# Patient Record
Sex: MALE | Race: WHITE | NOT HISPANIC OR LATINO | URBAN - METROPOLITAN AREA
[De-identification: names, ages, dates, MRNs, and addresses within clinical notes are randomized per-mention and may not be internally consistent; named-entity substitution may affect disease eponyms.]

---
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
## 2022-05-15 ENCOUNTER — HOSPITAL ENCOUNTER (EMERGENCY)
Facility: HOSPITAL | Age: 22
Discharge: HOME | End: 2022-05-15
Attending: EMERGENCY MEDICINE | Admitting: EMERGENCY MEDICINE

## 2022-05-15 VITALS
HEART RATE: 97 BPM | TEMPERATURE: 98.4 F | BODY MASS INDEX: 33.7 KG/M2 | HEIGHT: 74 IN | DIASTOLIC BLOOD PRESSURE: 68 MMHG | OXYGEN SATURATION: 99 % | RESPIRATION RATE: 16 BRPM | SYSTOLIC BLOOD PRESSURE: 133 MMHG | WEIGHT: 262.57 LBS

## 2022-05-15 DIAGNOSIS — F31.9 BIPOLAR AFFECTIVE DISORDER, REMISSION STATUS UNSPECIFIED: Primary | ICD-10-CM

## 2022-05-15 LAB
ALBUMIN SERPL-MCNC: 4.9 G/DL (ref 3.5–5.2)
ALBUMIN/GLOB SERPL: 1.8 G/DL
ALP SERPL-CCNC: 100 U/L (ref 39–117)
ALT SERPL W P-5'-P-CCNC: 134 U/L (ref 1–41)
AMPHET+METHAMPHET UR QL: NEGATIVE
ANION GAP SERPL CALCULATED.3IONS-SCNC: 13 MMOL/L (ref 5–15)
APAP SERPL-MCNC: <5 MCG/ML (ref 0–30)
AST SERPL-CCNC: 46 U/L (ref 1–40)
BARBITURATES UR QL SCN: NEGATIVE
BASOPHILS # BLD AUTO: 0.02 10*3/MM3 (ref 0–0.2)
BASOPHILS NFR BLD AUTO: 0.2 % (ref 0–1.5)
BENZODIAZ UR QL SCN: NEGATIVE
BILIRUB SERPL-MCNC: 0.3 MG/DL (ref 0–1.2)
BUN SERPL-MCNC: 15 MG/DL (ref 6–20)
BUN/CREAT SERPL: 13.6 (ref 7–25)
CALCIUM SPEC-SCNC: 10.1 MG/DL (ref 8.6–10.5)
CANNABINOIDS SERPL QL: NEGATIVE
CHLORIDE SERPL-SCNC: 105 MMOL/L (ref 98–107)
CO2 SERPL-SCNC: 26 MMOL/L (ref 22–29)
COCAINE UR QL: NEGATIVE
CREAT SERPL-MCNC: 1.1 MG/DL (ref 0.76–1.27)
DEPRECATED RDW RBC AUTO: 37.4 FL (ref 37–54)
EGFRCR SERPLBLD CKD-EPI 2021: 97.9 ML/MIN/1.73
EOSINOPHIL # BLD AUTO: 0.22 10*3/MM3 (ref 0–0.4)
EOSINOPHIL NFR BLD AUTO: 2 % (ref 0.3–6.2)
ERYTHROCYTE [DISTWIDTH] IN BLOOD BY AUTOMATED COUNT: 12.6 % (ref 12.3–15.4)
ETHANOL BLD-MCNC: <10 MG/DL (ref 0–10)
ETHANOL UR QL: <0.01 %
GLOBULIN UR ELPH-MCNC: 2.8 GM/DL
GLUCOSE SERPL-MCNC: 104 MG/DL (ref 65–99)
HCT VFR BLD AUTO: 46.8 % (ref 37.5–51)
HGB BLD-MCNC: 15.8 G/DL (ref 13–17.7)
HOLD SPECIMEN: NORMAL
HOLD SPECIMEN: NORMAL
IMM GRANULOCYTES # BLD AUTO: 0.05 10*3/MM3 (ref 0–0.05)
IMM GRANULOCYTES NFR BLD AUTO: 0.5 % (ref 0–0.5)
LITHIUM SERPL-SCNC: 0.4 MMOL/L (ref 0.6–1.2)
LYMPHOCYTES # BLD AUTO: 2.98 10*3/MM3 (ref 0.7–3.1)
LYMPHOCYTES NFR BLD AUTO: 27.3 % (ref 19.6–45.3)
MCH RBC QN AUTO: 27.6 PG (ref 26.6–33)
MCHC RBC AUTO-ENTMCNC: 33.8 G/DL (ref 31.5–35.7)
MCV RBC AUTO: 81.8 FL (ref 79–97)
METHADONE UR QL SCN: NEGATIVE
MONOCYTES # BLD AUTO: 0.71 10*3/MM3 (ref 0.1–0.9)
MONOCYTES NFR BLD AUTO: 6.5 % (ref 5–12)
NEUTROPHILS NFR BLD AUTO: 6.94 10*3/MM3 (ref 1.7–7)
NEUTROPHILS NFR BLD AUTO: 63.5 % (ref 42.7–76)
NRBC BLD AUTO-RTO: 0 /100 WBC (ref 0–0.2)
OPIATES UR QL: NEGATIVE
OXYCODONE UR QL SCN: NEGATIVE
PLATELET # BLD AUTO: 336 10*3/MM3 (ref 140–450)
PMV BLD AUTO: 9.1 FL (ref 6–12)
POTASSIUM SERPL-SCNC: 3.9 MMOL/L (ref 3.5–5.2)
PROT SERPL-MCNC: 7.7 G/DL (ref 6–8.5)
RBC # BLD AUTO: 5.72 10*6/MM3 (ref 4.14–5.8)
SALICYLATES SERPL-MCNC: <0.3 MG/DL
SARS-COV-2 RNA RESP QL NAA+PROBE: NOT DETECTED
SODIUM SERPL-SCNC: 144 MMOL/L (ref 136–145)
T4 FREE SERPL-MCNC: 1.18 NG/DL (ref 0.93–1.7)
TSH SERPL DL<=0.05 MIU/L-ACNC: 4.63 UIU/ML (ref 0.27–4.2)
WBC NRBC COR # BLD: 10.92 10*3/MM3 (ref 3.4–10.8)
WHOLE BLOOD HOLD COAG: NORMAL
WHOLE BLOOD HOLD SPECIMEN: NORMAL

## 2022-05-15 PROCEDURE — 84443 ASSAY THYROID STIM HORMONE: CPT | Performed by: EMERGENCY MEDICINE

## 2022-05-15 PROCEDURE — 80307 DRUG TEST PRSMV CHEM ANLYZR: CPT | Performed by: EMERGENCY MEDICINE

## 2022-05-15 PROCEDURE — 80053 COMPREHEN METABOLIC PANEL: CPT

## 2022-05-15 PROCEDURE — U0003 INFECTIOUS AGENT DETECTION BY NUCLEIC ACID (DNA OR RNA); SEVERE ACUTE RESPIRATORY SYNDROME CORONAVIRUS 2 (SARS-COV-2) (CORONAVIRUS DISEASE [COVID-19]), AMPLIFIED PROBE TECHNIQUE, MAKING USE OF HIGH THROUGHPUT TECHNOLOGIES AS DESCRIBED BY CMS-2020-01-R: HCPCS | Performed by: EMERGENCY MEDICINE

## 2022-05-15 PROCEDURE — 99284 EMERGENCY DEPT VISIT MOD MDM: CPT

## 2022-05-15 PROCEDURE — 80178 ASSAY OF LITHIUM: CPT

## 2022-05-15 PROCEDURE — 99283 EMERGENCY DEPT VISIT LOW MDM: CPT

## 2022-05-15 PROCEDURE — 82077 ASSAY SPEC XCP UR&BREATH IA: CPT

## 2022-05-15 PROCEDURE — 36415 COLL VENOUS BLD VENIPUNCTURE: CPT

## 2022-05-15 PROCEDURE — 93005 ELECTROCARDIOGRAM TRACING: CPT

## 2022-05-15 PROCEDURE — 85025 COMPLETE CBC W/AUTO DIFF WBC: CPT

## 2022-05-15 PROCEDURE — 80179 DRUG ASSAY SALICYLATE: CPT

## 2022-05-15 PROCEDURE — 84439 ASSAY OF FREE THYROXINE: CPT | Performed by: EMERGENCY MEDICINE

## 2022-05-15 PROCEDURE — 80143 DRUG ASSAY ACETAMINOPHEN: CPT

## 2022-05-15 RX ORDER — QUETIAPINE FUMARATE 50 MG/1
50 TABLET, FILM COATED ORAL NIGHTLY
COMMUNITY
End: 2022-06-27 | Stop reason: HOSPADM

## 2022-05-15 RX ORDER — NICOTINE 21 MG/24HR
1 PATCH, TRANSDERMAL 24 HOURS TRANSDERMAL
Status: DISCONTINUED | OUTPATIENT
Start: 2022-05-15 | End: 2022-05-15 | Stop reason: HOSPADM

## 2022-05-15 RX ORDER — LANOLIN ALCOHOL/MO/W.PET/CERES
9 CREAM (GRAM) TOPICAL NIGHTLY PRN
COMMUNITY
End: 2022-07-01 | Stop reason: HOSPADM

## 2022-05-15 RX ORDER — QUETIAPINE FUMARATE 50 MG/1
50 TABLET, FILM COATED ORAL NIGHTLY
Qty: 30 TABLET | Refills: 0 | Status: SHIPPED | OUTPATIENT
Start: 2022-05-15 | End: 2022-06-27 | Stop reason: HOSPADM

## 2022-05-15 RX ORDER — TRAZODONE HYDROCHLORIDE 150 MG/1
150 TABLET ORAL NIGHTLY
Qty: 30 TABLET | Refills: 0 | Status: SHIPPED | OUTPATIENT
Start: 2022-05-15 | End: 2022-06-27 | Stop reason: HOSPADM

## 2022-05-15 RX ORDER — SODIUM CHLORIDE 0.9 % (FLUSH) 0.9 %
10 SYRINGE (ML) INJECTION AS NEEDED
Status: DISCONTINUED | OUTPATIENT
Start: 2022-05-15 | End: 2022-05-15 | Stop reason: HOSPADM

## 2022-05-15 RX ORDER — LITHIUM CARBONATE 450 MG
450 TABLET, EXTENDED RELEASE ORAL 2 TIMES DAILY
COMMUNITY
End: 2022-06-27 | Stop reason: HOSPADM

## 2022-05-15 RX ORDER — LITHIUM CARBONATE 450 MG
450 TABLET, EXTENDED RELEASE ORAL 2 TIMES DAILY
Qty: 60 TABLET | Refills: 0 | Status: SHIPPED | OUTPATIENT
Start: 2022-05-15 | End: 2022-06-27 | Stop reason: HOSPADM

## 2022-05-15 RX ORDER — TRAZODONE HYDROCHLORIDE 150 MG/1
150 TABLET ORAL NIGHTLY
COMMUNITY
End: 2022-07-01 | Stop reason: HOSPADM

## 2022-05-15 NOTE — ED TRIAGE NOTES
Pt ambulatory to ED triage with c/o SI for the last week. Pt reports hx of si attempt w/ hanging himself, pt reports takes medication for anxiety/depression and bipolar but is not currently seeing a therapist.   Pt reports his birthday is coming up and that is always a trigger for him after his parents  in . Pt denies active plan but persistent thoughts of it over the last week.  Pt presents a/ox4, gcs 15
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
## 2022-06-08 ENCOUNTER — HOSPITAL ENCOUNTER (EMERGENCY)
Facility: HOSPITAL | Age: 22
Discharge: HOME | End: 2022-06-08
Attending: EMERGENCY MEDICINE | Admitting: EMERGENCY MEDICINE

## 2022-06-08 VITALS
TEMPERATURE: 98.4 F | BODY MASS INDEX: 35.11 KG/M2 | SYSTOLIC BLOOD PRESSURE: 118 MMHG | DIASTOLIC BLOOD PRESSURE: 92 MMHG | HEART RATE: 88 BPM | WEIGHT: 273.59 LBS | HEIGHT: 74 IN | RESPIRATION RATE: 18 BRPM | OXYGEN SATURATION: 94 %

## 2022-06-08 DIAGNOSIS — R11.11 VOMITING WITHOUT NAUSEA, UNSPECIFIED VOMITING TYPE: Primary | ICD-10-CM

## 2022-06-08 LAB
ALBUMIN SERPL-MCNC: 4.4 G/DL (ref 3.5–5.2)
ALBUMIN/GLOB SERPL: 1.7 G/DL
ALP SERPL-CCNC: 93 U/L (ref 39–117)
ALT SERPL W P-5'-P-CCNC: 98 U/L (ref 1–41)
ANION GAP SERPL CALCULATED.3IONS-SCNC: 10.9 MMOL/L (ref 5–15)
AST SERPL-CCNC: 45 U/L (ref 1–40)
BASOPHILS # BLD AUTO: 0.04 10*3/MM3 (ref 0–0.2)
BASOPHILS NFR BLD AUTO: 0.3 % (ref 0–1.5)
BILIRUB SERPL-MCNC: 0.3 MG/DL (ref 0–1.2)
BUN SERPL-MCNC: 16 MG/DL (ref 6–20)
BUN/CREAT SERPL: 17.6 (ref 7–25)
CALCIUM SPEC-SCNC: 9.9 MG/DL (ref 8.6–10.5)
CHLORIDE SERPL-SCNC: 105 MMOL/L (ref 98–107)
CO2 SERPL-SCNC: 28.1 MMOL/L (ref 22–29)
CREAT SERPL-MCNC: 0.91 MG/DL (ref 0.76–1.27)
DEPRECATED RDW RBC AUTO: 38.2 FL (ref 37–54)
EGFRCR SERPLBLD CKD-EPI 2021: 122.2 ML/MIN/1.73
EOSINOPHIL # BLD AUTO: 0.11 10*3/MM3 (ref 0–0.4)
EOSINOPHIL NFR BLD AUTO: 0.9 % (ref 0.3–6.2)
ERYTHROCYTE [DISTWIDTH] IN BLOOD BY AUTOMATED COUNT: 13 % (ref 12.3–15.4)
GLOBULIN UR ELPH-MCNC: 2.6 GM/DL
GLUCOSE SERPL-MCNC: 126 MG/DL (ref 65–99)
HCT VFR BLD AUTO: 43.5 % (ref 37.5–51)
HGB BLD-MCNC: 15 G/DL (ref 13–17.7)
HOLD SPECIMEN: NORMAL
HOLD SPECIMEN: NORMAL
IMM GRANULOCYTES # BLD AUTO: 0.04 10*3/MM3 (ref 0–0.05)
IMM GRANULOCYTES NFR BLD AUTO: 0.3 % (ref 0–0.5)
LIPASE SERPL-CCNC: 33 U/L (ref 13–60)
LITHIUM SERPL-SCNC: 0.4 MMOL/L (ref 0.6–1.2)
LYMPHOCYTES # BLD AUTO: 3.13 10*3/MM3 (ref 0.7–3.1)
LYMPHOCYTES NFR BLD AUTO: 25.5 % (ref 19.6–45.3)
MCH RBC QN AUTO: 28 PG (ref 26.6–33)
MCHC RBC AUTO-ENTMCNC: 34.5 G/DL (ref 31.5–35.7)
MCV RBC AUTO: 81.3 FL (ref 79–97)
MONOCYTES # BLD AUTO: 0.73 10*3/MM3 (ref 0.1–0.9)
MONOCYTES NFR BLD AUTO: 5.9 % (ref 5–12)
NEUTROPHILS NFR BLD AUTO: 67.1 % (ref 42.7–76)
NEUTROPHILS NFR BLD AUTO: 8.23 10*3/MM3 (ref 1.7–7)
NRBC BLD AUTO-RTO: 0 /100 WBC (ref 0–0.2)
PLATELET # BLD AUTO: 270 10*3/MM3 (ref 140–450)
PMV BLD AUTO: 9.1 FL (ref 6–12)
POTASSIUM SERPL-SCNC: 4.1 MMOL/L (ref 3.5–5.2)
PROT SERPL-MCNC: 7 G/DL (ref 6–8.5)
RBC # BLD AUTO: 5.35 10*6/MM3 (ref 4.14–5.8)
SODIUM SERPL-SCNC: 144 MMOL/L (ref 136–145)
WBC NRBC COR # BLD: 12.28 10*3/MM3 (ref 3.4–10.8)
WHOLE BLOOD HOLD COAG: NORMAL
WHOLE BLOOD HOLD SPECIMEN: NORMAL

## 2022-06-08 PROCEDURE — 85025 COMPLETE CBC W/AUTO DIFF WBC: CPT

## 2022-06-08 PROCEDURE — 96375 TX/PRO/DX INJ NEW DRUG ADDON: CPT

## 2022-06-08 PROCEDURE — 96374 THER/PROPH/DIAG INJ IV PUSH: CPT

## 2022-06-08 PROCEDURE — 80178 ASSAY OF LITHIUM: CPT | Performed by: EMERGENCY MEDICINE

## 2022-06-08 PROCEDURE — 83690 ASSAY OF LIPASE: CPT

## 2022-06-08 PROCEDURE — 80053 COMPREHEN METABOLIC PANEL: CPT

## 2022-06-08 PROCEDURE — 99283 EMERGENCY DEPT VISIT LOW MDM: CPT

## 2022-06-08 PROCEDURE — 25010000002 ONDANSETRON PER 1 MG: Performed by: EMERGENCY MEDICINE

## 2022-06-08 RX ORDER — ONDANSETRON 4 MG/1
4 TABLET, ORALLY DISINTEGRATING ORAL EVERY 8 HOURS PRN
Qty: 15 TABLET | Refills: 0 | Status: SHIPPED | OUTPATIENT
Start: 2022-06-08 | End: 2022-06-27 | Stop reason: HOSPADM

## 2022-06-08 RX ORDER — SODIUM CHLORIDE 0.9 % (FLUSH) 0.9 %
10 SYRINGE (ML) INJECTION AS NEEDED
Status: DISCONTINUED | OUTPATIENT
Start: 2022-06-08 | End: 2022-06-08 | Stop reason: HOSPADM

## 2022-06-08 RX ORDER — FAMOTIDINE 10 MG/ML
20 INJECTION, SOLUTION INTRAVENOUS ONCE
Status: COMPLETED | OUTPATIENT
Start: 2022-06-08 | End: 2022-06-08

## 2022-06-08 RX ORDER — PANTOPRAZOLE SODIUM 40 MG/1
40 TABLET, DELAYED RELEASE ORAL DAILY
Qty: 30 TABLET | Refills: 0 | Status: ON HOLD | OUTPATIENT
Start: 2022-06-08 | End: 2022-07-01 | Stop reason: SDUPTHER

## 2022-06-08 RX ORDER — ONDANSETRON 2 MG/ML
4 INJECTION INTRAMUSCULAR; INTRAVENOUS ONCE
Status: COMPLETED | OUTPATIENT
Start: 2022-06-08 | End: 2022-06-08

## 2022-06-08 RX ADMIN — SODIUM CHLORIDE 1000 ML: 9 INJECTION, SOLUTION INTRAVENOUS at 02:00

## 2022-06-08 RX ADMIN — FAMOTIDINE 20 MG: 10 INJECTION INTRAVENOUS at 02:00

## 2022-06-08 RX ADMIN — ONDANSETRON 4 MG: 2 INJECTION INTRAMUSCULAR; INTRAVENOUS at 02:00
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
## 2022-06-08 NOTE — ED PROVIDER NOTES
Time: 12:44 AM EDT  Arrived by: ambulance  Chief Complaint: Abdominal pain, vomiting  History provided by: Pt  History is limited by: N/A     History of Present Illness:  Patient is a 22 y.o. male that presents to the emergency department with RUQ abdominal pain and hematemesis that started at at 2100 yesterday. Pt described hematemesis as bright red streaking of 4 episodes. Pt reports associated symptoms of diarrhea and chills but denies fever. To notes, pt is on Lithium which he has not taken any within 24 hours but currently not on any new medication.       History provided by:  Patient   used: No    Abdominal Pain  Pain location:  RUQ  Pain radiates to:  Does not radiate  Pain severity:  Unable to specify  Onset quality:  Sudden  Duration:  4 hours  Timing:  Constant  Progression:  Unchanged  Chronicity:  New  Relieved by:  None tried  Worsened by:  Nothing  Ineffective treatments:  None tried  Associated symptoms: chills, diarrhea and vomiting (bright red)    Associated symptoms: no chest pain, no cough, no dysuria, no fever, no nausea, no shortness of breath and no sore throat    Blood in Urine  Associated symptoms include abdominal pain, chills and vomiting (bright red). Pertinent negatives include no dysuria, fever or nausea.   Vomiting  The primary symptoms include abdominal pain, vomiting (bright red) and diarrhea. Primary symptoms do not include fever, nausea, dysuria or myalgias.   The illness is also significant for chills.       Similar Symptoms Previously: no   Recently seen: Pt was seen in this ED on 05/15/2022 for depression.       Patient Care Team  Primary Care Provider: Provider, No Known    Past Medical History:     Allergies   Allergen Reactions   • Onion Anaphylaxis   • Tomato Itching     Past Medical History:   Diagnosis Date   • Anxiety    • Bipolar 1 disorder (HCC)    • Depression    • PTSD (post-traumatic stress disorder)      Past Surgical History:   Procedure  Laterality Date   • KNEE ACL RECONSTRUCTION Right      History reviewed. No pertinent family history.    Home Medications:  Prior to Admission medications    Medication Sig Start Date End Date Taking? Authorizing Provider   lithium (ESKALITH) 450 MG CR tablet Take 450 mg by mouth 2 (Two) Times a Day.    Russ Gallegos MD   lithium (ESKALITH) 450 MG CR tablet Take 1 tablet by mouth 2 (Two) Times a Day. 5/15/22   Preet Marino DO   melatonin 3 MG tablet Take 9 mg by mouth At Night As Needed for Sleep.    Russ Gallegos MD   QUEtiapine (SEROquel) 50 MG tablet Take 50 mg by mouth Every Night.    Russ Gallegos MD   QUEtiapine (SEROquel) 50 MG tablet Take 1 tablet by mouth Every Night. 5/15/22   Preet Marino DO   traZODone (DESYREL) 150 MG tablet Take 150 mg by mouth Every Night.    Russ Gallegos MD   traZODone (DESYREL) 150 MG tablet Take 1 tablet by mouth Every Night. 5/15/22   Preet Marino DO        Social History:   Social History     Tobacco Use   • Smoking status: Current Every Day Smoker     Packs/day: 0.50     Types: Cigarettes   Substance Use Topics   • Alcohol use: Yes     Comment: social   • Drug use: Yes     Types: Methamphetamines     Comment: last use one week ago     Recent travel: not applicable     Review of Systems:  Review of Systems   Constitutional: Positive for chills. Negative for fever.   HENT: Negative for congestion, rhinorrhea and sore throat.    Eyes: Negative for pain and visual disturbance.   Respiratory: Negative for apnea, cough, chest tightness and shortness of breath.    Cardiovascular: Negative for chest pain and palpitations.   Gastrointestinal: Positive for abdominal pain, diarrhea and vomiting (bright red). Negative for nausea.   Genitourinary: Negative for difficulty urinating and dysuria.   Musculoskeletal: Negative for joint swelling and myalgias.   Skin: Negative for color change.   Neurological: Negative for seizures and headaches.  "  Psychiatric/Behavioral: Negative.    All other systems reviewed and are negative.       Physical Exam:  /92   Pulse 88   Temp 98.4 °F (36.9 °C) (Oral)   Resp 18   Ht 188 cm (74\")   Wt 124 kg (273 lb 9.5 oz)   SpO2 93%   BMI 35.13 kg/m²     Physical Exam  Vitals and nursing note reviewed.   Constitutional:       General: He is not in acute distress.     Appearance: Normal appearance. He is not toxic-appearing.   HENT:      Head: Normocephalic and atraumatic.      Jaw: There is normal jaw occlusion.   Eyes:      General: Lids are normal.      Extraocular Movements: Extraocular movements intact.      Conjunctiva/sclera: Conjunctivae normal.      Pupils: Pupils are equal, round, and reactive to light.   Cardiovascular:      Rate and Rhythm: Normal rate and regular rhythm.      Pulses: Normal pulses.      Heart sounds: Normal heart sounds.   Pulmonary:      Effort: Pulmonary effort is normal. No respiratory distress.      Breath sounds: Normal breath sounds. No wheezing or rhonchi.   Abdominal:      General: Abdomen is flat.      Palpations: Abdomen is soft.      Tenderness: There is abdominal tenderness in the right upper quadrant. There is no guarding or rebound.   Musculoskeletal:         General: Normal range of motion.      Cervical back: Normal range of motion and neck supple.      Right lower leg: No edema.      Left lower leg: No edema.   Skin:     General: Skin is warm and dry.   Neurological:      Mental Status: He is alert and oriented to person, place, and time. Mental status is at baseline.   Psychiatric:         Mood and Affect: Mood normal.                Medications in the Emergency Department:  Medications   sodium chloride 0.9 % flush 10 mL (has no administration in time range)   sodium chloride 0.9 % bolus 1,000 mL (1,000 mL Intravenous New Bag 6/8/22 0200)   ondansetron (ZOFRAN) injection 4 mg (4 mg Intravenous Given 6/8/22 0200)   famotidine (PEPCID) injection 20 mg (20 mg Intravenous " Given 6/8/22 0200)        Labs  Lab Results (last 24 hours)     Procedure Component Value Units Date/Time    CBC & Differential [[id number]]  (Abnormal) Collected: 06/08/22 0038    Specimen: Blood Updated: 06/08/22 0048    Narrative:      The following orders were created for panel order CBC & Differential.  Procedure                               Abnormality         Status                     ---------                               -----------         ------                     CBC Auto Differential[[id number]]        Abnormal            Final result                 Please view results for these tests on the individual orders.    Comprehensive Metabolic Panel [[id number]]  (Abnormal) Collected: 06/08/22 0038    Specimen: Blood Updated: 06/08/22 0105     Glucose 126 mg/dL      BUN 16 mg/dL      Creatinine 0.91 mg/dL      Sodium 144 mmol/L      Potassium 4.1 mmol/L      Chloride 105 mmol/L      CO2 28.1 mmol/L      Calcium 9.9 mg/dL      Total Protein 7.0 g/dL      Albumin 4.40 g/dL      ALT (SGPT) 98 U/L      AST (SGOT) 45 U/L      Alkaline Phosphatase 93 U/L      Total Bilirubin 0.3 mg/dL      Globulin 2.6 gm/dL      A/G Ratio 1.7 g/dL      BUN/Creatinine Ratio 17.6     Anion Gap 10.9 mmol/L      eGFR 122.2 mL/min/1.73      Comment: National Kidney Foundation and American Society of Nephrology (ASN) Task Force recommended calculation based on the Chronic Kidney Disease Epidemiology Collaboration (CKD-EPI) equation refit without adjustment for race.       Narrative:      GFR Normal >60  Chronic Kidney Disease <60  Kidney Failure <15      Lipase [[id number]]  (Normal) Collected: 06/08/22 0038    Specimen: Blood Updated: 06/08/22 0105     Lipase 33 U/L     CBC Auto Differential [[id number]]  (Abnormal) Collected: 06/08/22 0038    Specimen: Blood Updated: 06/08/22 0048     WBC 12.28 10*3/mm3      RBC 5.35 10*6/mm3      Hemoglobin 15.0 g/dL      Hematocrit 43.5 %      MCV 81.3 fL      MCH 28.0 pg      MCHC 34.5 g/dL      RDW  13.0 %      RDW-SD 38.2 fl      MPV 9.1 fL      Platelets 270 10*3/mm3      Neutrophil % 67.1 %      Lymphocyte % 25.5 %      Monocyte % 5.9 %      Eosinophil % 0.9 %      Basophil % 0.3 %      Immature Grans % 0.3 %      Neutrophils, Absolute 8.23 10*3/mm3      Lymphocytes, Absolute 3.13 10*3/mm3      Monocytes, Absolute 0.73 10*3/mm3      Eosinophils, Absolute 0.11 10*3/mm3      Basophils, Absolute 0.04 10*3/mm3      Immature Grans, Absolute 0.04 10*3/mm3      nRBC 0.0 /100 WBC     Lithium Level [[id number]]  (Abnormal) Collected: 06/08/22 0038    Specimen: Blood Updated: 06/08/22 0209     Lithium 0.4 mmol/L            Imaging:  No Radiology Exams Resulted Within Past 24 Hours    Procedures:  Procedures    Progress                            Medical Decision Making:  MDM  Number of Diagnoses or Management Options  Vomiting without nausea, unspecified vomiting type  Diagnosis management comments: The patient presents with vomiting and diarrhea consistent with gastroenteritis.  The patient´s CBC was reviewed and shows no abnormalities of critical concern. Of note, there is no anemia requiring a blood transfusion and the platelet count is acceptable.  The patient´s CMP was reviewed and shows no abnormalities of critical concern. Of note, the patient´s sodium and potassium are acceptable. The patient´s liver enzymes are unremarkable. The patient´s renal function (creatinine) is preserved. The patient has a normal anion gap.  The patient has a soft and benign abdominal exam. The patient is now resting comfortably and feels better, is alert, and is in no distress. The patient is able to tolerate po intake in the ED. The patient´s labs were reviewed and hydration status was assessed. The patient has no signs of acute renal failure, sepsis, or dehydration that warrants admission to the hospital. The vital signs have been stable. The patient's condition is stable and appropriate for discharge. The patient will pursue  further outpatient evaluation with the primary care physician or designated physician. The patient and/or caregivers have expressed a clear and thorough understanding and agreed to follow-up as instructed.       Amount and/or Complexity of Data Reviewed  Clinical lab tests: reviewed    Risk of Complications, Morbidity, and/or Mortality  Presenting problems: moderate  Management options: moderate    Patient Progress  Patient progress: stable       Final diagnoses:   Vomiting without nausea, unspecified vomiting type        Disposition:  ED Disposition     ED Disposition   Discharge    Condition   Stable    Comment   --             Documentation assistance provided by SERENA JOHN acting as scribe for Radha Osman MD. Information recorded by the scribe was done at my direction and has been verified and validated by me.          Serena John  06/08/22 0157       Radha Osman MD  06/08/22 3589

## 2022-06-12 ENCOUNTER — HOSPITAL ENCOUNTER (EMERGENCY)
Facility: HOSPITAL | Age: 22
Discharge: NURSING, REHAB OR LONG-TERM CARE | End: 2022-06-12
Attending: EMERGENCY MEDICINE | Admitting: EMERGENCY MEDICINE

## 2022-06-12 ENCOUNTER — APPOINTMENT (OUTPATIENT)
Dept: GENERAL RADIOLOGY | Facility: HOSPITAL | Age: 22
End: 2022-06-12

## 2022-06-12 VITALS
BODY MASS INDEX: 35.2 KG/M2 | SYSTOLIC BLOOD PRESSURE: 116 MMHG | RESPIRATION RATE: 18 BRPM | OXYGEN SATURATION: 94 % | HEIGHT: 74 IN | HEART RATE: 62 BPM | WEIGHT: 274.25 LBS | TEMPERATURE: 97.6 F | DIASTOLIC BLOOD PRESSURE: 62 MMHG

## 2022-06-12 DIAGNOSIS — F99 PSYCHIATRIC COMPLAINT: Primary | ICD-10-CM

## 2022-06-12 DIAGNOSIS — R45.851 SUICIDAL IDEATIONS: ICD-10-CM

## 2022-06-12 LAB
ALBUMIN SERPL-MCNC: 4.3 G/DL (ref 3.5–5.2)
ALBUMIN/GLOB SERPL: 1.9 G/DL
ALP SERPL-CCNC: 99 U/L (ref 39–117)
ALT SERPL W P-5'-P-CCNC: 97 U/L (ref 1–41)
AMPHET+METHAMPHET UR QL: NEGATIVE
ANION GAP SERPL CALCULATED.3IONS-SCNC: 8.1 MMOL/L (ref 5–15)
APAP SERPL-MCNC: <5 MCG/ML (ref 0–30)
AST SERPL-CCNC: 50 U/L (ref 1–40)
BARBITURATES UR QL SCN: NEGATIVE
BASOPHILS # BLD AUTO: 0.03 10*3/MM3 (ref 0–0.2)
BASOPHILS NFR BLD AUTO: 0.3 % (ref 0–1.5)
BENZODIAZ UR QL SCN: NEGATIVE
BILIRUB SERPL-MCNC: 0.3 MG/DL (ref 0–1.2)
BILIRUB UR QL STRIP: NEGATIVE
BUN SERPL-MCNC: 11 MG/DL (ref 6–20)
BUN/CREAT SERPL: 12.8 (ref 7–25)
CALCIUM SPEC-SCNC: 9 MG/DL (ref 8.6–10.5)
CANNABINOIDS SERPL QL: NEGATIVE
CHLORIDE SERPL-SCNC: 106 MMOL/L (ref 98–107)
CLARITY UR: CLEAR
CO2 SERPL-SCNC: 25.9 MMOL/L (ref 22–29)
COCAINE UR QL: NEGATIVE
COLOR UR: YELLOW
CREAT SERPL-MCNC: 0.86 MG/DL (ref 0.76–1.27)
DEPRECATED RDW RBC AUTO: 37.5 FL (ref 37–54)
EGFRCR SERPLBLD CKD-EPI 2021: 125.6 ML/MIN/1.73
EOSINOPHIL # BLD AUTO: 0.18 10*3/MM3 (ref 0–0.4)
EOSINOPHIL NFR BLD AUTO: 2.1 % (ref 0.3–6.2)
ERYTHROCYTE [DISTWIDTH] IN BLOOD BY AUTOMATED COUNT: 12.7 % (ref 12.3–15.4)
ETHANOL BLD-MCNC: <10 MG/DL (ref 0–10)
ETHANOL UR QL: <0.01 %
GLOBULIN UR ELPH-MCNC: 2.3 GM/DL
GLUCOSE SERPL-MCNC: 99 MG/DL (ref 65–99)
GLUCOSE UR STRIP-MCNC: NEGATIVE MG/DL
HCT VFR BLD AUTO: 43.6 % (ref 37.5–51)
HGB BLD-MCNC: 15.1 G/DL (ref 13–17.7)
HGB UR QL STRIP.AUTO: NEGATIVE
HOLD SPECIMEN: NORMAL
HOLD SPECIMEN: NORMAL
IMM GRANULOCYTES # BLD AUTO: 0.04 10*3/MM3 (ref 0–0.05)
IMM GRANULOCYTES NFR BLD AUTO: 0.5 % (ref 0–0.5)
INR PPP: 0.88 (ref 0.86–1.15)
KETONES UR QL STRIP: NEGATIVE
LEUKOCYTE ESTERASE UR QL STRIP.AUTO: NEGATIVE
LITHIUM SERPL-SCNC: <0.1 MMOL/L (ref 0.6–1.2)
LYMPHOCYTES # BLD AUTO: 2.58 10*3/MM3 (ref 0.7–3.1)
LYMPHOCYTES NFR BLD AUTO: 29.7 % (ref 19.6–45.3)
MCH RBC QN AUTO: 28.3 PG (ref 26.6–33)
MCHC RBC AUTO-ENTMCNC: 34.6 G/DL (ref 31.5–35.7)
MCV RBC AUTO: 81.6 FL (ref 79–97)
METHADONE UR QL SCN: NEGATIVE
MONOCYTES # BLD AUTO: 0.56 10*3/MM3 (ref 0.1–0.9)
MONOCYTES NFR BLD AUTO: 6.4 % (ref 5–12)
NEUTROPHILS NFR BLD AUTO: 5.3 10*3/MM3 (ref 1.7–7)
NEUTROPHILS NFR BLD AUTO: 61 % (ref 42.7–76)
NITRITE UR QL STRIP: NEGATIVE
NRBC BLD AUTO-RTO: 0 /100 WBC (ref 0–0.2)
OPIATES UR QL: NEGATIVE
OXYCODONE UR QL SCN: NEGATIVE
PH UR STRIP.AUTO: 6.5 [PH] (ref 5–8)
PLATELET # BLD AUTO: 288 10*3/MM3 (ref 140–450)
PMV BLD AUTO: 9.2 FL (ref 6–12)
POTASSIUM SERPL-SCNC: 4.1 MMOL/L (ref 3.5–5.2)
PROT SERPL-MCNC: 6.6 G/DL (ref 6–8.5)
PROT UR QL STRIP: NEGATIVE
PROTHROMBIN TIME: 12 SECONDS (ref 11.8–14.9)
RBC # BLD AUTO: 5.34 10*6/MM3 (ref 4.14–5.8)
SALICYLATES SERPL-MCNC: 0.4 MG/DL
SARS-COV-2 RNA RESP QL NAA+PROBE: NOT DETECTED
SODIUM SERPL-SCNC: 140 MMOL/L (ref 136–145)
SP GR UR STRIP: 1.01 (ref 1–1.03)
TSH SERPL DL<=0.05 MIU/L-ACNC: 2.49 UIU/ML (ref 0.27–4.2)
UROBILINOGEN UR QL STRIP: NORMAL
WBC NRBC COR # BLD: 8.69 10*3/MM3 (ref 3.4–10.8)
WHOLE BLOOD HOLD COAG: NORMAL
WHOLE BLOOD HOLD SPECIMEN: NORMAL

## 2022-06-12 PROCEDURE — 80307 DRUG TEST PRSMV CHEM ANLYZR: CPT | Performed by: EMERGENCY MEDICINE

## 2022-06-12 PROCEDURE — 36415 COLL VENOUS BLD VENIPUNCTURE: CPT

## 2022-06-12 PROCEDURE — 99283 EMERGENCY DEPT VISIT LOW MDM: CPT

## 2022-06-12 PROCEDURE — 84443 ASSAY THYROID STIM HORMONE: CPT

## 2022-06-12 PROCEDURE — 80053 COMPREHEN METABOLIC PANEL: CPT | Performed by: EMERGENCY MEDICINE

## 2022-06-12 PROCEDURE — 81003 URINALYSIS AUTO W/O SCOPE: CPT

## 2022-06-12 PROCEDURE — U0003 INFECTIOUS AGENT DETECTION BY NUCLEIC ACID (DNA OR RNA); SEVERE ACUTE RESPIRATORY SYNDROME CORONAVIRUS 2 (SARS-COV-2) (CORONAVIRUS DISEASE [COVID-19]), AMPLIFIED PROBE TECHNIQUE, MAKING USE OF HIGH THROUGHPUT TECHNOLOGIES AS DESCRIBED BY CMS-2020-01-R: HCPCS | Performed by: EMERGENCY MEDICINE

## 2022-06-12 PROCEDURE — 85025 COMPLETE CBC W/AUTO DIFF WBC: CPT

## 2022-06-12 PROCEDURE — C9803 HOPD COVID-19 SPEC COLLECT: HCPCS

## 2022-06-12 PROCEDURE — 80178 ASSAY OF LITHIUM: CPT

## 2022-06-12 PROCEDURE — 80179 DRUG ASSAY SALICYLATE: CPT | Performed by: EMERGENCY MEDICINE

## 2022-06-12 PROCEDURE — 73560 X-RAY EXAM OF KNEE 1 OR 2: CPT

## 2022-06-12 PROCEDURE — 80143 DRUG ASSAY ACETAMINOPHEN: CPT | Performed by: EMERGENCY MEDICINE

## 2022-06-12 PROCEDURE — 82077 ASSAY SPEC XCP UR&BREATH IA: CPT | Performed by: EMERGENCY MEDICINE

## 2022-06-12 PROCEDURE — 85610 PROTHROMBIN TIME: CPT

## 2022-06-12 RX ORDER — IBUPROFEN 400 MG/1
800 TABLET ORAL ONCE
Status: COMPLETED | OUTPATIENT
Start: 2022-06-12 | End: 2022-06-12

## 2022-06-12 RX ORDER — SODIUM CHLORIDE 0.9 % (FLUSH) 0.9 %
10 SYRINGE (ML) INJECTION AS NEEDED
Status: DISCONTINUED | OUTPATIENT
Start: 2022-06-12 | End: 2022-06-12 | Stop reason: HOSPADM

## 2022-06-12 RX ADMIN — IBUPROFEN 800 MG: 400 TABLET, FILM COATED ORAL at 10:16
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
## 2022-06-12 NOTE — DISCHARGE INSTRUCTIONS
A representative from the [facility] will be arriving at the hospital/emergency department and provide transportation for you back to the facility.  We have spoken with them and they are arranging for you to be in a different house.  He can be treated for mental health complaints as well as her substance abuse and this new house.  Please continue to abstain from using substances or alcohol.  If it anytime you feel as if you or wanting to hurt yourself, have feelings of hurting other people, or have any type of hallucinations including but not limited to visual and auditory, please return to the ER immediately.

## 2022-06-12 NOTE — SIGNIFICANT NOTE
06/12/22 6348   Plan   Plan Comments Evaluator recommends inpatient rehab treatment for patient. Patient last used meth 3 days ago and is seeking treatment. SW to explore options that take patient's insurance.   Final Discharge Disposition Code 30 - still a patient

## 2022-06-12 NOTE — SIGNIFICANT NOTE
06/12/22 1207   Plan   Plan Comments SW contacted [facility] for ER consult per provider request.   Final Discharge Disposition Code 30 - still a patient

## 2022-06-12 NOTE — ED PROVIDER NOTES
Subjective   Patient is a 22-year-old male that presents to the emergency department today with suicidal ideations and right knee pain.  Patient is currently staying at the [facility]. they referred him to emergency department today due to new onset suicidal ideations last night.  Patient states that the last night he started wanting to use drugs again and then became depressed and that was his trigger for suicide.  He states he has attempted suicide in the past.  He reports buying a gun and attempting to shoot himself however his friend took the firing pin out of the gun and it did not fire.  He does have a plan this time as well.  He states that he would like to hang himself and that he has even purchased a rope.  He denies any homicidal ideations and any type of hallucinations including auditory and visual.  He does report his last use of methamphetamine to be approximately 3 days ago.  Additionally he reports right knee pain stating that in 2016 he had surgery to repair a torn ACL.  He rates that current pain and discomfort as a 7 on a scale of 0-10.  He also states he has not had his lithium for an unknown amount of time.      History provided by:  Patient   used: No        Review of Systems   Constitutional: Negative for chills and fever.   HENT: Negative for congestion, ear pain and sore throat.    Eyes: Negative for pain.   Respiratory: Negative for cough, chest tightness and shortness of breath.    Cardiovascular: Negative for chest pain.   Gastrointestinal: Negative for abdominal pain, diarrhea, nausea and vomiting.   Genitourinary: Negative for flank pain and hematuria.   Musculoskeletal: Negative for joint swelling.        Right knee pain   Skin: Negative for pallor.   Neurological: Negative for seizures and headaches.   Psychiatric/Behavioral: Positive for suicidal ideas. Negative for agitation, behavioral problems, confusion and hallucinations.   All other systems  reviewed and are negative.      Past Medical History:   Diagnosis Date   • Anxiety    • Bipolar 1 disorder (HCC)    • Depression    • PTSD (post-traumatic stress disorder)        Allergies   Allergen Reactions   • Onion Anaphylaxis   • Tomato Itching       Past Surgical History:   Procedure Laterality Date   • KNEE ACL RECONSTRUCTION Right        History reviewed. No pertinent family history.    Social History     Socioeconomic History   • Marital status: Single   Tobacco Use   • Smoking status: Current Every Day Smoker     Packs/day: 0.50     Types: Cigarettes   • Smokeless tobacco: Never Used   Substance and Sexual Activity   • Alcohol use: Not Currently     Comment: social   • Drug use: Yes     Types: Methamphetamines     Comment: last use 32 hours ago   • Sexual activity: Defer           Objective   Physical Exam  Vitals and nursing note reviewed.   Constitutional:       General: He is not in acute distress.     Appearance: Normal appearance. He is not toxic-appearing.   HENT:      Head: Normocephalic and atraumatic.      Mouth/Throat:      Mouth: Mucous membranes are moist.   Eyes:      General: No scleral icterus.  Cardiovascular:      Rate and Rhythm: Normal rate and regular rhythm.      Pulses: Normal pulses.      Heart sounds: Normal heart sounds.   Pulmonary:      Effort: Pulmonary effort is normal. No respiratory distress.      Breath sounds: Normal breath sounds.   Abdominal:      General: Abdomen is flat.      Palpations: Abdomen is soft.      Tenderness: There is no abdominal tenderness.   Musculoskeletal:         General: No swelling, tenderness, deformity or signs of injury. Normal range of motion.      Cervical back: Normal range of motion and neck supple.      Right lower leg: No edema.      Left lower leg: No edema.      Comments: Patient reports right knee pain however there is no pinpoint tenderness on exam.  No edema, no gross deformity, range of motion at baseline, PMS intact, cap refill  within normal limits.   Skin:     General: Skin is warm and dry.      Capillary Refill: Capillary refill takes less than 2 seconds.      Coloration: Skin is not jaundiced or pale.      Findings: No bruising, erythema, lesion or rash.   Neurological:      Mental Status: He is alert and oriented to person, place, and time. Mental status is at baseline.      Sensory: No sensory deficit.      Coordination: Coordination normal.   Psychiatric:      Comments: Patient has suicidal ideations, with a plan to hang himself.  He denies any homicidal ideations and denies any type of auditory or visual hallucinations.         Procedures           ED Course  ED Course as of 06/12/22 1355   Sun Jun 12, 2022   1110 All of patient's labs have resulted and are within normal limits.  CSW Antonia is aware of the patient and will help arrange psych consult/evaluation and possible bed placement at a higher level of care facility. [MS]   1143 ER CSW has contacted Roger with [facility] and he will be coming to bedside to evaluate pt.  [MS]   1225 Roger with [facility] is at bedside evaluating pt.  [MS]   1225 Roger with communicator advises that the facility patient is currently at, [facility] for mental health services as well.  It is our impression after extended evaluations that patient is unhappy at the house he is in and possibly being bullied thus increasing his cravings for drugs and depression due to the fact that he does not want to start using again.  That depression then leads to his suicidal ideations.  NORRIS Woods then went to bedside to speak with patient regarding his insurance and a possible transfer to another facility.  Patient's insurance is accepted at a local commitment house however they do not have any beds available.  [facility] and [facility] services however they do not accept his insurance.  After speaking with Akilah again, it was determined that  he could be moved to a different house on the campus and can start receiving mental health therapy in addition to his addiction therapy.  Patient is in agreements with this.  Additionally, [facility] will come and pick patient up and transport him back to their facility. [MS]      ED Course User Index  [MS] Roseann Soria, APRN                                               Medications   sodium chloride 0.9 % flush 10 mL (has no administration in time range)   ibuprofen (ADVIL,MOTRIN) tablet 800 mg (800 mg Oral Given 6/12/22 1016)       MDM  Number of Diagnoses or Management Options  Psychiatric complaint: new and requires workup  Suicidal ideations: new and requires workup  Diagnosis management comments: Patient is a 22-year-old male that presented to the emergency department today from the [facility] for suicidal ideations that he reports started last night.  He became depressed because he wanted to use methamphetamines again.  That depression was a trigger for his suicidal ideations.  He did have a plan of hanging himself and states that he has bought a rope.  He has attempted suicide in the past by purchasing a gun however was unable to fire the gun due to his friend removing the firing pin.  Patient's last use of methamphetamine was reported to be 3 days ago.  His urine drug screen was negative for any illegal substances.  Additionally all of his other labs were within acceptable limits.  This patient was evaluated by myself, ER NORRIS Gould, as well as community care Representative Roger.  After extensive interviews with them as well as calls to multiple facilities, patient will be returning to the [facility] but in a different house.  He will also be placed in the mental health program as well as his substance abuse.  This was discussed with the patient and he is agreeable to this plan.  For specifics surrounding these events and plans please see note made by NORRIS Woods as well as  communicare's H&P       Amount and/or Complexity of Data Reviewed  Clinical lab tests: ordered and reviewed  Tests in the radiology section of CPT®: ordered and reviewed  Decide to obtain previous medical records or to obtain history from someone other than the patient: yes  Review and summarize past medical records: yes (I have personally reviewed patient's previous medical encounters.  )    Risk of Complications, Morbidity, and/or Mortality  Presenting problems: high  Diagnostic procedures: moderate    Patient Progress  Patient progress: stable      Final diagnoses:   Psychiatric complaint   Suicidal ideations       ED Disposition  ED Disposition     ED Disposition   Discharge    Condition   Stable    Comment   --             [facility]  A representative from the [facility] will pick you up today.  You are to follow-up with them for therapy and substance abuse.             Medication List      No changes were made to your prescriptions during this visit.          Roseann Soria, APRN  06/12/22 9541

## 2022-06-23 ENCOUNTER — HOSPITAL ENCOUNTER (EMERGENCY)
Facility: HOSPITAL | Age: 22
Discharge: PSYCHIATRIC OR SUBSTANCE USE FACILITY | End: 2022-06-23
Attending: EMERGENCY MEDICINE

## 2022-06-23 ENCOUNTER — HOSPITAL ENCOUNTER (INPATIENT)
Facility: HOSPITAL | Age: 22
LOS: 4 days | Discharge: PSYCHIATRIC OR SUBSTANCE USE FACILITY | End: 2022-06-27
Attending: PSYCHIATRY & NEUROLOGY | Admitting: PSYCHIATRY & NEUROLOGY

## 2022-06-23 VITALS
HEIGHT: 74 IN | SYSTOLIC BLOOD PRESSURE: 133 MMHG | HEART RATE: 106 BPM | RESPIRATION RATE: 16 BRPM | TEMPERATURE: 98.8 F | OXYGEN SATURATION: 96 % | BODY MASS INDEX: 33.3 KG/M2 | WEIGHT: 259.48 LBS | DIASTOLIC BLOOD PRESSURE: 80 MMHG

## 2022-06-23 DIAGNOSIS — F19.11 HISTORY OF SUBSTANCE ABUSE: ICD-10-CM

## 2022-06-23 DIAGNOSIS — Z59.00 HOMELESSNESS: Primary | ICD-10-CM

## 2022-06-23 DIAGNOSIS — R45.851 SUICIDAL IDEATION: ICD-10-CM

## 2022-06-23 PROBLEM — F33.1 MAJOR DEPRESSIVE DISORDER, RECURRENT EPISODE, MODERATE: Status: ACTIVE | Noted: 2022-06-23

## 2022-06-23 LAB
ALBUMIN SERPL-MCNC: 4.7 G/DL (ref 3.5–5.2)
ALBUMIN/GLOB SERPL: 1.7 G/DL
ALP SERPL-CCNC: 103 U/L (ref 39–117)
ALT SERPL W P-5'-P-CCNC: 100 U/L (ref 1–41)
AMPHET+METHAMPHET UR QL: NEGATIVE
ANION GAP SERPL CALCULATED.3IONS-SCNC: 13.3 MMOL/L (ref 5–15)
APAP SERPL-MCNC: <5 MCG/ML (ref 0–30)
AST SERPL-CCNC: 41 U/L (ref 1–40)
BARBITURATES UR QL SCN: NEGATIVE
BASOPHILS # BLD AUTO: 0.02 10*3/MM3 (ref 0–0.2)
BASOPHILS NFR BLD AUTO: 0.2 % (ref 0–1.5)
BENZODIAZ UR QL SCN: NEGATIVE
BILIRUB SERPL-MCNC: 0.3 MG/DL (ref 0–1.2)
BUN SERPL-MCNC: 14 MG/DL (ref 6–20)
BUN/CREAT SERPL: 13.3 (ref 7–25)
CALCIUM SPEC-SCNC: 10.4 MG/DL (ref 8.6–10.5)
CANNABINOIDS SERPL QL: NEGATIVE
CHLORIDE SERPL-SCNC: 107 MMOL/L (ref 98–107)
CO2 SERPL-SCNC: 24.7 MMOL/L (ref 22–29)
COCAINE UR QL: NEGATIVE
CREAT SERPL-MCNC: 1.05 MG/DL (ref 0.76–1.27)
DEPRECATED RDW RBC AUTO: 36.4 FL (ref 37–54)
EGFRCR SERPLBLD CKD-EPI 2021: 102.9 ML/MIN/1.73
EOSINOPHIL # BLD AUTO: 0.2 10*3/MM3 (ref 0–0.4)
EOSINOPHIL NFR BLD AUTO: 1.6 % (ref 0.3–6.2)
ERYTHROCYTE [DISTWIDTH] IN BLOOD BY AUTOMATED COUNT: 12.6 % (ref 12.3–15.4)
ETHANOL BLD-MCNC: <10 MG/DL (ref 0–10)
ETHANOL UR QL: <0.01 %
GLOBULIN UR ELPH-MCNC: 2.8 GM/DL
GLUCOSE SERPL-MCNC: 122 MG/DL (ref 65–99)
HCT VFR BLD AUTO: 48.3 % (ref 37.5–51)
HGB BLD-MCNC: 16.8 G/DL (ref 13–17.7)
HOLD SPECIMEN: NORMAL
HOLD SPECIMEN: NORMAL
IMM GRANULOCYTES # BLD AUTO: 0.03 10*3/MM3 (ref 0–0.05)
IMM GRANULOCYTES NFR BLD AUTO: 0.2 % (ref 0–0.5)
LITHIUM SERPL-SCNC: <0.1 MMOL/L (ref 0.6–1.2)
LYMPHOCYTES # BLD AUTO: 2.91 10*3/MM3 (ref 0.7–3.1)
LYMPHOCYTES NFR BLD AUTO: 23.9 % (ref 19.6–45.3)
MCH RBC QN AUTO: 28.1 PG (ref 26.6–33)
MCHC RBC AUTO-ENTMCNC: 34.8 G/DL (ref 31.5–35.7)
MCV RBC AUTO: 80.9 FL (ref 79–97)
METHADONE UR QL SCN: NEGATIVE
MONOCYTES # BLD AUTO: 0.8 10*3/MM3 (ref 0.1–0.9)
MONOCYTES NFR BLD AUTO: 6.6 % (ref 5–12)
NEUTROPHILS NFR BLD AUTO: 67.5 % (ref 42.7–76)
NEUTROPHILS NFR BLD AUTO: 8.24 10*3/MM3 (ref 1.7–7)
NRBC BLD AUTO-RTO: 0 /100 WBC (ref 0–0.2)
OPIATES UR QL: NEGATIVE
OXYCODONE UR QL SCN: NEGATIVE
PLATELET # BLD AUTO: 356 10*3/MM3 (ref 140–450)
PMV BLD AUTO: 9.3 FL (ref 6–12)
POTASSIUM SERPL-SCNC: 4 MMOL/L (ref 3.5–5.2)
PROT SERPL-MCNC: 7.5 G/DL (ref 6–8.5)
RBC # BLD AUTO: 5.97 10*6/MM3 (ref 4.14–5.8)
SALICYLATES SERPL-MCNC: <0.3 MG/DL
SARS-COV-2 RNA RESP QL NAA+PROBE: NOT DETECTED
SODIUM SERPL-SCNC: 145 MMOL/L (ref 136–145)
T4 FREE SERPL-MCNC: 1.3 NG/DL (ref 0.93–1.7)
TSH SERPL DL<=0.05 MIU/L-ACNC: 1.11 UIU/ML (ref 0.27–4.2)
WBC NRBC COR # BLD: 12.2 10*3/MM3 (ref 3.4–10.8)
WHOLE BLOOD HOLD COAG: NORMAL
WHOLE BLOOD HOLD SPECIMEN: NORMAL

## 2022-06-23 PROCEDURE — 80307 DRUG TEST PRSMV CHEM ANLYZR: CPT

## 2022-06-23 PROCEDURE — 84443 ASSAY THYROID STIM HORMONE: CPT | Performed by: EMERGENCY MEDICINE

## 2022-06-23 PROCEDURE — 82077 ASSAY SPEC XCP UR&BREATH IA: CPT

## 2022-06-23 PROCEDURE — 80053 COMPREHEN METABOLIC PANEL: CPT

## 2022-06-23 PROCEDURE — 99283 EMERGENCY DEPT VISIT LOW MDM: CPT

## 2022-06-23 PROCEDURE — 84439 ASSAY OF FREE THYROXINE: CPT | Performed by: EMERGENCY MEDICINE

## 2022-06-23 PROCEDURE — 80143 DRUG ASSAY ACETAMINOPHEN: CPT

## 2022-06-23 PROCEDURE — 80178 ASSAY OF LITHIUM: CPT

## 2022-06-23 PROCEDURE — 80179 DRUG ASSAY SALICYLATE: CPT

## 2022-06-23 PROCEDURE — 85025 COMPLETE CBC W/AUTO DIFF WBC: CPT

## 2022-06-23 PROCEDURE — U0003 INFECTIOUS AGENT DETECTION BY NUCLEIC ACID (DNA OR RNA); SEVERE ACUTE RESPIRATORY SYNDROME CORONAVIRUS 2 (SARS-COV-2) (CORONAVIRUS DISEASE [COVID-19]), AMPLIFIED PROBE TECHNIQUE, MAKING USE OF HIGH THROUGHPUT TECHNOLOGIES AS DESCRIBED BY CMS-2020-01-R: HCPCS | Performed by: EMERGENCY MEDICINE

## 2022-06-23 PROCEDURE — U0004 COV-19 TEST NON-CDC HGH THRU: HCPCS | Performed by: EMERGENCY MEDICINE

## 2022-06-23 RX ORDER — HYDROXYZINE PAMOATE 50 MG/1
50 CAPSULE ORAL EVERY 6 HOURS PRN
Status: DISCONTINUED | OUTPATIENT
Start: 2022-06-23 | End: 2022-06-27 | Stop reason: HOSPADM

## 2022-06-23 RX ORDER — LOPERAMIDE HYDROCHLORIDE 2 MG/1
2 CAPSULE ORAL
Status: DISCONTINUED | OUTPATIENT
Start: 2022-06-23 | End: 2022-06-27 | Stop reason: HOSPADM

## 2022-06-23 RX ORDER — ACETAMINOPHEN 325 MG/1
650 TABLET ORAL EVERY 4 HOURS PRN
Status: DISCONTINUED | OUTPATIENT
Start: 2022-06-23 | End: 2022-06-27 | Stop reason: HOSPADM

## 2022-06-23 RX ORDER — DIPHENHYDRAMINE HCL 50 MG
50 CAPSULE ORAL EVERY 4 HOURS PRN
Status: DISCONTINUED | OUTPATIENT
Start: 2022-06-23 | End: 2022-06-27 | Stop reason: HOSPADM

## 2022-06-23 RX ORDER — HALOPERIDOL 5 MG/1
5 TABLET ORAL EVERY 4 HOURS PRN
Status: DISCONTINUED | OUTPATIENT
Start: 2022-06-23 | End: 2022-06-27 | Stop reason: HOSPADM

## 2022-06-23 RX ORDER — LORAZEPAM 2 MG/ML
2 INJECTION INTRAMUSCULAR EVERY 4 HOURS PRN
Status: DISCONTINUED | OUTPATIENT
Start: 2022-06-23 | End: 2022-06-27 | Stop reason: HOSPADM

## 2022-06-23 RX ORDER — NICOTINE 21 MG/24HR
1 PATCH, TRANSDERMAL 24 HOURS TRANSDERMAL
Status: DISCONTINUED | OUTPATIENT
Start: 2022-06-24 | End: 2022-06-25

## 2022-06-23 RX ORDER — SODIUM CHLORIDE 0.9 % (FLUSH) 0.9 %
10 SYRINGE (ML) INJECTION AS NEEDED
Status: DISCONTINUED | OUTPATIENT
Start: 2022-06-23 | End: 2022-06-23 | Stop reason: HOSPADM

## 2022-06-23 RX ORDER — DIPHENHYDRAMINE HYDROCHLORIDE 50 MG/ML
50 INJECTION INTRAMUSCULAR; INTRAVENOUS EVERY 4 HOURS PRN
Status: DISCONTINUED | OUTPATIENT
Start: 2022-06-23 | End: 2022-06-27 | Stop reason: HOSPADM

## 2022-06-23 RX ORDER — HALOPERIDOL 5 MG/ML
5 INJECTION INTRAMUSCULAR EVERY 4 HOURS PRN
Status: DISCONTINUED | OUTPATIENT
Start: 2022-06-23 | End: 2022-06-27 | Stop reason: HOSPADM

## 2022-06-23 RX ORDER — ALUMINA, MAGNESIA, AND SIMETHICONE 2400; 2400; 240 MG/30ML; MG/30ML; MG/30ML
15 SUSPENSION ORAL EVERY 6 HOURS PRN
Status: DISCONTINUED | OUTPATIENT
Start: 2022-06-23 | End: 2022-06-27 | Stop reason: HOSPADM

## 2022-06-23 RX ORDER — LORAZEPAM 2 MG/1
2 TABLET ORAL EVERY 4 HOURS PRN
Status: DISCONTINUED | OUTPATIENT
Start: 2022-06-23 | End: 2022-06-27 | Stop reason: HOSPADM

## 2022-06-23 RX ORDER — TRAZODONE HYDROCHLORIDE 50 MG/1
100 TABLET ORAL NIGHTLY PRN
Status: DISCONTINUED | OUTPATIENT
Start: 2022-06-23 | End: 2022-06-27 | Stop reason: HOSPADM

## 2022-06-23 SDOH — ECONOMIC STABILITY - HOUSING INSECURITY: HOMELESSNESS UNSPECIFIED: Z59.00

## 2022-06-24 PROBLEM — Z72.0 TOBACCO ABUSE: Status: ACTIVE | Noted: 2022-06-24

## 2022-06-24 PROBLEM — F43.10 POST TRAUMATIC STRESS DISORDER (PTSD): Status: ACTIVE | Noted: 2022-06-24

## 2022-06-24 PROBLEM — F15.90 AMPHETAMINE MISUSE: Status: ACTIVE | Noted: 2022-06-24

## 2022-06-24 PROBLEM — E66.9 OBESITY: Status: ACTIVE | Noted: 2022-06-24

## 2022-06-24 PROBLEM — K21.9 GASTROESOPHAGEAL REFLUX DISEASE: Status: ACTIVE | Noted: 2022-06-24

## 2022-06-24 PROBLEM — G47.33 OBSTRUCTIVE SLEEP APNEA: Status: ACTIVE | Noted: 2022-06-24

## 2022-06-24 PROCEDURE — 94640 AIRWAY INHALATION TREATMENT: CPT

## 2022-06-24 PROCEDURE — 94799 UNLISTED PULMONARY SVC/PX: CPT

## 2022-06-24 RX ORDER — LITHIUM CARBONATE 450 MG
450 TABLET, EXTENDED RELEASE ORAL 2 TIMES DAILY
Status: DISCONTINUED | OUTPATIENT
Start: 2022-06-24 | End: 2022-06-27 | Stop reason: HOSPADM

## 2022-06-24 RX ORDER — PRAZOSIN HYDROCHLORIDE 1 MG/1
1 CAPSULE ORAL NIGHTLY
Status: DISCONTINUED | OUTPATIENT
Start: 2022-06-24 | End: 2022-06-27 | Stop reason: HOSPADM

## 2022-06-24 RX ORDER — PANTOPRAZOLE SODIUM 40 MG/1
40 TABLET, DELAYED RELEASE ORAL
Status: DISCONTINUED | OUTPATIENT
Start: 2022-06-25 | End: 2022-06-27 | Stop reason: HOSPADM

## 2022-06-24 RX ORDER — ALBUTEROL SULFATE 90 UG/1
2 AEROSOL, METERED RESPIRATORY (INHALATION) EVERY 6 HOURS PRN
Status: DISCONTINUED | OUTPATIENT
Start: 2022-06-24 | End: 2022-06-27 | Stop reason: HOSPADM

## 2022-06-24 RX ADMIN — PRAZOSIN HYDROCHLORIDE 1 MG: 1 CAPSULE ORAL at 21:00

## 2022-06-24 RX ADMIN — TRAZODONE HYDROCHLORIDE 100 MG: 50 TABLET ORAL at 22:10

## 2022-06-24 RX ADMIN — TRAZODONE HYDROCHLORIDE 100 MG: 50 TABLET ORAL at 00:30

## 2022-06-24 RX ADMIN — ALBUTEROL SULFATE 2 PUFF: 90 AEROSOL, METERED RESPIRATORY (INHALATION) at 23:27

## 2022-06-24 RX ADMIN — LITHIUM CARBONATE 450 MG: 450 TABLET ORAL at 21:00

## 2022-06-24 RX ADMIN — HYDROXYZINE PAMOATE 50 MG: 50 CAPSULE ORAL at 23:50

## 2022-06-25 RX ADMIN — PANTOPRAZOLE SODIUM 40 MG: 40 TABLET, DELAYED RELEASE ORAL at 09:55

## 2022-06-25 RX ADMIN — LITHIUM CARBONATE 450 MG: 450 TABLET ORAL at 21:10

## 2022-06-25 RX ADMIN — TRAZODONE HYDROCHLORIDE 100 MG: 50 TABLET ORAL at 21:11

## 2022-06-25 RX ADMIN — PRAZOSIN HYDROCHLORIDE 1 MG: 1 CAPSULE ORAL at 21:11

## 2022-06-25 RX ADMIN — LITHIUM CARBONATE 450 MG: 450 TABLET ORAL at 09:55

## 2022-06-26 RX ADMIN — LITHIUM CARBONATE 450 MG: 450 TABLET ORAL at 08:14

## 2022-06-26 RX ADMIN — PANTOPRAZOLE SODIUM 40 MG: 40 TABLET, DELAYED RELEASE ORAL at 07:40

## 2022-06-26 RX ADMIN — LITHIUM CARBONATE 450 MG: 450 TABLET ORAL at 20:39

## 2022-06-26 RX ADMIN — PRAZOSIN HYDROCHLORIDE 1 MG: 1 CAPSULE ORAL at 20:38

## 2022-06-27 VITALS
HEIGHT: 74 IN | BODY MASS INDEX: 33.28 KG/M2 | SYSTOLIC BLOOD PRESSURE: 111 MMHG | WEIGHT: 259.3 LBS | RESPIRATION RATE: 18 BRPM | OXYGEN SATURATION: 100 % | TEMPERATURE: 97.4 F | DIASTOLIC BLOOD PRESSURE: 70 MMHG | HEART RATE: 86 BPM

## 2022-06-27 RX ORDER — LITHIUM CARBONATE 450 MG
450 TABLET, EXTENDED RELEASE ORAL 2 TIMES DAILY
Qty: 60 TABLET | Refills: 1 | Status: ON HOLD | OUTPATIENT
Start: 2022-06-27 | End: 2022-07-01 | Stop reason: SDUPTHER

## 2022-06-27 RX ORDER — PRAZOSIN HYDROCHLORIDE 1 MG/1
1 CAPSULE ORAL NIGHTLY
Qty: 30 CAPSULE | Refills: 1 | Status: ON HOLD | OUTPATIENT
Start: 2022-06-27 | End: 2022-07-01 | Stop reason: SDUPTHER

## 2022-06-27 RX ADMIN — PANTOPRAZOLE SODIUM 40 MG: 40 TABLET, DELAYED RELEASE ORAL at 06:34

## 2022-06-27 RX ADMIN — LITHIUM CARBONATE 450 MG: 450 TABLET ORAL at 08:07

## 2022-06-27 RX ADMIN — ALUMINUM HYDROXIDE, MAGNESIUM HYDROXIDE, AND DIMETHICONE 15 ML: 400; 400; 40 SUSPENSION ORAL at 02:00

## 2022-06-28 ENCOUNTER — HOSPITAL ENCOUNTER (EMERGENCY)
Facility: HOSPITAL | Age: 22
Discharge: PSYCHIATRIC OR SUBSTANCE USE FACILITY | End: 2022-06-29
Attending: EMERGENCY MEDICINE | Admitting: EMERGENCY MEDICINE

## 2022-06-28 VITALS
DIASTOLIC BLOOD PRESSURE: 80 MMHG | OXYGEN SATURATION: 98 % | SYSTOLIC BLOOD PRESSURE: 132 MMHG | WEIGHT: 260.14 LBS | TEMPERATURE: 98.9 F | HEIGHT: 74 IN | RESPIRATION RATE: 20 BRPM | BODY MASS INDEX: 33.39 KG/M2 | HEART RATE: 95 BPM

## 2022-06-28 DIAGNOSIS — R45.851 SUICIDAL IDEATIONS: Primary | ICD-10-CM

## 2022-06-28 PROBLEM — F32.A DEPRESSION: Status: ACTIVE | Noted: 2022-06-28

## 2022-06-28 LAB
ALBUMIN SERPL-MCNC: 4.5 G/DL (ref 3.5–5.2)
ALBUMIN/GLOB SERPL: 1.9 G/DL
ALP SERPL-CCNC: 97 U/L (ref 39–117)
ALT SERPL W P-5'-P-CCNC: 72 U/L (ref 1–41)
AMPHET+METHAMPHET UR QL: NEGATIVE
ANION GAP SERPL CALCULATED.3IONS-SCNC: 11.9 MMOL/L (ref 5–15)
APAP SERPL-MCNC: <5 MCG/ML (ref 0–30)
AST SERPL-CCNC: 36 U/L (ref 1–40)
BARBITURATES UR QL SCN: NEGATIVE
BASOPHILS # BLD AUTO: 0.02 10*3/MM3 (ref 0–0.2)
BASOPHILS NFR BLD AUTO: 0.2 % (ref 0–1.5)
BENZODIAZ UR QL SCN: NEGATIVE
BILIRUB SERPL-MCNC: 0.2 MG/DL (ref 0–1.2)
BUN SERPL-MCNC: 13 MG/DL (ref 6–20)
BUN/CREAT SERPL: 13.1 (ref 7–25)
CALCIUM SPEC-SCNC: 10.1 MG/DL (ref 8.6–10.5)
CANNABINOIDS SERPL QL: NEGATIVE
CHLORIDE SERPL-SCNC: 104 MMOL/L (ref 98–107)
CO2 SERPL-SCNC: 27.1 MMOL/L (ref 22–29)
COCAINE UR QL: NEGATIVE
CREAT SERPL-MCNC: 0.99 MG/DL (ref 0.76–1.27)
DEPRECATED RDW RBC AUTO: 38.2 FL (ref 37–54)
EGFRCR SERPLBLD CKD-EPI 2021: 110.5 ML/MIN/1.73
EOSINOPHIL # BLD AUTO: 0.23 10*3/MM3 (ref 0–0.4)
EOSINOPHIL NFR BLD AUTO: 2.2 % (ref 0.3–6.2)
ERYTHROCYTE [DISTWIDTH] IN BLOOD BY AUTOMATED COUNT: 12.8 % (ref 12.3–15.4)
ETHANOL BLD-MCNC: <10 MG/DL (ref 0–10)
ETHANOL UR QL: <0.01 %
GLOBULIN UR ELPH-MCNC: 2.4 GM/DL
GLUCOSE SERPL-MCNC: 111 MG/DL (ref 65–99)
HCT VFR BLD AUTO: 44.1 % (ref 37.5–51)
HGB BLD-MCNC: 14.9 G/DL (ref 13–17.7)
HOLD SPECIMEN: NORMAL
HOLD SPECIMEN: NORMAL
IMM GRANULOCYTES # BLD AUTO: 0.03 10*3/MM3 (ref 0–0.05)
IMM GRANULOCYTES NFR BLD AUTO: 0.3 % (ref 0–0.5)
LITHIUM SERPL-SCNC: 0.4 MMOL/L (ref 0.6–1.2)
LYMPHOCYTES # BLD AUTO: 3.25 10*3/MM3 (ref 0.7–3.1)
LYMPHOCYTES NFR BLD AUTO: 30.8 % (ref 19.6–45.3)
MCH RBC QN AUTO: 28 PG (ref 26.6–33)
MCHC RBC AUTO-ENTMCNC: 33.8 G/DL (ref 31.5–35.7)
MCV RBC AUTO: 82.7 FL (ref 79–97)
METHADONE UR QL SCN: NEGATIVE
MONOCYTES # BLD AUTO: 0.64 10*3/MM3 (ref 0.1–0.9)
MONOCYTES NFR BLD AUTO: 6.1 % (ref 5–12)
NEUTROPHILS NFR BLD AUTO: 6.38 10*3/MM3 (ref 1.7–7)
NEUTROPHILS NFR BLD AUTO: 60.4 % (ref 42.7–76)
NRBC BLD AUTO-RTO: 0 /100 WBC (ref 0–0.2)
OPIATES UR QL: NEGATIVE
OXYCODONE UR QL SCN: NEGATIVE
PLATELET # BLD AUTO: 288 10*3/MM3 (ref 140–450)
PMV BLD AUTO: 9.4 FL (ref 6–12)
POTASSIUM SERPL-SCNC: 3.9 MMOL/L (ref 3.5–5.2)
PROT SERPL-MCNC: 6.9 G/DL (ref 6–8.5)
RBC # BLD AUTO: 5.33 10*6/MM3 (ref 4.14–5.8)
SALICYLATES SERPL-MCNC: <0.3 MG/DL
SARS-COV-2 RNA RESP QL NAA+PROBE: NOT DETECTED
SODIUM SERPL-SCNC: 143 MMOL/L (ref 136–145)
WBC NRBC COR # BLD: 10.55 10*3/MM3 (ref 3.4–10.8)
WHOLE BLOOD HOLD COAG: NORMAL
WHOLE BLOOD HOLD SPECIMEN: NORMAL

## 2022-06-28 PROCEDURE — 99284 EMERGENCY DEPT VISIT MOD MDM: CPT

## 2022-06-28 PROCEDURE — 80307 DRUG TEST PRSMV CHEM ANLYZR: CPT | Performed by: EMERGENCY MEDICINE

## 2022-06-28 PROCEDURE — 82077 ASSAY SPEC XCP UR&BREATH IA: CPT | Performed by: EMERGENCY MEDICINE

## 2022-06-28 PROCEDURE — 85025 COMPLETE CBC W/AUTO DIFF WBC: CPT

## 2022-06-28 PROCEDURE — 80178 ASSAY OF LITHIUM: CPT | Performed by: EMERGENCY MEDICINE

## 2022-06-28 PROCEDURE — U0003 INFECTIOUS AGENT DETECTION BY NUCLEIC ACID (DNA OR RNA); SEVERE ACUTE RESPIRATORY SYNDROME CORONAVIRUS 2 (SARS-COV-2) (CORONAVIRUS DISEASE [COVID-19]), AMPLIFIED PROBE TECHNIQUE, MAKING USE OF HIGH THROUGHPUT TECHNOLOGIES AS DESCRIBED BY CMS-2020-01-R: HCPCS | Performed by: EMERGENCY MEDICINE

## 2022-06-28 PROCEDURE — C9803 HOPD COVID-19 SPEC COLLECT: HCPCS

## 2022-06-28 PROCEDURE — 80053 COMPREHEN METABOLIC PANEL: CPT

## 2022-06-28 PROCEDURE — 80179 DRUG ASSAY SALICYLATE: CPT

## 2022-06-28 PROCEDURE — 80143 DRUG ASSAY ACETAMINOPHEN: CPT

## 2022-06-28 RX ORDER — IBUPROFEN 400 MG/1
800 TABLET ORAL ONCE
Status: COMPLETED | OUTPATIENT
Start: 2022-06-28 | End: 2022-06-28

## 2022-06-28 RX ORDER — ALUMINA, MAGNESIA, AND SIMETHICONE 2400; 2400; 240 MG/30ML; MG/30ML; MG/30ML
15 SUSPENSION ORAL EVERY 6 HOURS PRN
Status: DISCONTINUED | OUTPATIENT
Start: 2022-06-28 | End: 2022-07-01 | Stop reason: HOSPADM

## 2022-06-28 RX ORDER — NICOTINE 21 MG/24HR
1 PATCH, TRANSDERMAL 24 HOURS TRANSDERMAL
Status: DISCONTINUED | OUTPATIENT
Start: 2022-06-29 | End: 2022-07-01 | Stop reason: HOSPADM

## 2022-06-28 RX ORDER — HALOPERIDOL 5 MG/ML
5 INJECTION INTRAMUSCULAR EVERY 4 HOURS PRN
Status: DISCONTINUED | OUTPATIENT
Start: 2022-06-28 | End: 2022-07-01 | Stop reason: HOSPADM

## 2022-06-28 RX ORDER — PANTOPRAZOLE SODIUM 40 MG/1
40 TABLET, DELAYED RELEASE ORAL
Status: DISCONTINUED | OUTPATIENT
Start: 2022-06-29 | End: 2022-07-01 | Stop reason: HOSPADM

## 2022-06-28 RX ORDER — LORAZEPAM 2 MG/1
2 TABLET ORAL EVERY 6 HOURS PRN
Status: DISCONTINUED | OUTPATIENT
Start: 2022-06-28 | End: 2022-07-01 | Stop reason: HOSPADM

## 2022-06-28 RX ORDER — TRAZODONE HYDROCHLORIDE 50 MG/1
100 TABLET ORAL NIGHTLY PRN
Status: DISCONTINUED | OUTPATIENT
Start: 2022-06-28 | End: 2022-07-01 | Stop reason: HOSPADM

## 2022-06-28 RX ORDER — DIPHENHYDRAMINE HYDROCHLORIDE 50 MG/ML
50 INJECTION INTRAMUSCULAR; INTRAVENOUS EVERY 4 HOURS PRN
Status: DISCONTINUED | OUTPATIENT
Start: 2022-06-28 | End: 2022-07-01 | Stop reason: HOSPADM

## 2022-06-28 RX ORDER — LORAZEPAM 2 MG/ML
2 INJECTION INTRAMUSCULAR EVERY 4 HOURS PRN
Status: DISCONTINUED | OUTPATIENT
Start: 2022-06-28 | End: 2022-07-01 | Stop reason: HOSPADM

## 2022-06-28 RX ORDER — HYDROXYZINE PAMOATE 50 MG/1
50 CAPSULE ORAL EVERY 6 HOURS PRN
Status: DISCONTINUED | OUTPATIENT
Start: 2022-06-28 | End: 2022-07-01 | Stop reason: HOSPADM

## 2022-06-28 RX ORDER — LITHIUM CARBONATE 450 MG
450 TABLET, EXTENDED RELEASE ORAL EVERY 12 HOURS SCHEDULED
Status: DISCONTINUED | OUTPATIENT
Start: 2022-06-29 | End: 2022-07-01 | Stop reason: HOSPADM

## 2022-06-28 RX ORDER — ACETAMINOPHEN 325 MG/1
650 TABLET ORAL EVERY 4 HOURS PRN
Status: DISCONTINUED | OUTPATIENT
Start: 2022-06-28 | End: 2022-07-01 | Stop reason: HOSPADM

## 2022-06-28 RX ORDER — PRAZOSIN HYDROCHLORIDE 1 MG/1
1 CAPSULE ORAL NIGHTLY
Status: DISCONTINUED | OUTPATIENT
Start: 2022-06-29 | End: 2022-07-01 | Stop reason: HOSPADM

## 2022-06-28 RX ORDER — HALOPERIDOL 5 MG/1
5 TABLET ORAL EVERY 4 HOURS PRN
Status: DISCONTINUED | OUTPATIENT
Start: 2022-06-28 | End: 2022-07-01 | Stop reason: HOSPADM

## 2022-06-28 RX ORDER — DIPHENHYDRAMINE HCL 50 MG
50 CAPSULE ORAL EVERY 4 HOURS PRN
Status: DISCONTINUED | OUTPATIENT
Start: 2022-06-28 | End: 2022-07-01 | Stop reason: HOSPADM

## 2022-06-28 RX ORDER — LOPERAMIDE HYDROCHLORIDE 2 MG/1
2 CAPSULE ORAL
Status: DISCONTINUED | OUTPATIENT
Start: 2022-06-28 | End: 2022-07-01 | Stop reason: HOSPADM

## 2022-06-28 RX ADMIN — IBUPROFEN 800 MG: 400 TABLET, FILM COATED ORAL at 22:32

## 2022-06-29 ENCOUNTER — HOSPITAL ENCOUNTER (INPATIENT)
Facility: HOSPITAL | Age: 22
LOS: 2 days | Discharge: HOME | End: 2022-07-01
Attending: PSYCHIATRY & NEUROLOGY | Admitting: PSYCHIATRY & NEUROLOGY

## 2022-06-29 RX ADMIN — LITHIUM CARBONATE 450 MG: 450 TABLET ORAL at 00:45

## 2022-06-29 RX ADMIN — TRAZODONE HYDROCHLORIDE 100 MG: 50 TABLET ORAL at 01:05

## 2022-06-29 RX ADMIN — PANTOPRAZOLE SODIUM 40 MG: 40 TABLET, DELAYED RELEASE ORAL at 07:56

## 2022-06-29 RX ADMIN — PRAZOSIN HYDROCHLORIDE 1 MG: 1 CAPSULE ORAL at 21:45

## 2022-06-29 RX ADMIN — LITHIUM CARBONATE 450 MG: 450 TABLET ORAL at 21:40

## 2022-06-29 RX ADMIN — PRAZOSIN HYDROCHLORIDE 1 MG: 1 CAPSULE ORAL at 00:45

## 2022-06-29 RX ADMIN — NICOTINE 1 PATCH: 21 PATCH, EXTENDED RELEASE TRANSDERMAL at 15:45

## 2022-06-29 RX ADMIN — LITHIUM CARBONATE 450 MG: 450 TABLET ORAL at 08:42

## 2022-06-29 RX ADMIN — TRAZODONE HYDROCHLORIDE 100 MG: 50 TABLET ORAL at 22:55

## 2022-06-30 RX ORDER — HYDROCODONE BITARTRATE AND ACETAMINOPHEN 5; 325 MG/1; MG/1
1 TABLET ORAL ONCE
Status: DISCONTINUED | OUTPATIENT
Start: 2022-06-30 | End: 2022-06-30

## 2022-06-30 RX ADMIN — TRAZODONE HYDROCHLORIDE 100 MG: 50 TABLET ORAL at 21:22

## 2022-06-30 RX ADMIN — LITHIUM CARBONATE 450 MG: 450 TABLET ORAL at 21:21

## 2022-06-30 RX ADMIN — HYDROXYZINE PAMOATE 50 MG: 50 CAPSULE ORAL at 00:04

## 2022-06-30 RX ADMIN — ACETAMINOPHEN 650 MG: 325 TABLET ORAL at 14:45

## 2022-06-30 RX ADMIN — PRAZOSIN HYDROCHLORIDE 1 MG: 1 CAPSULE ORAL at 21:22

## 2022-06-30 RX ADMIN — LITHIUM CARBONATE 450 MG: 450 TABLET ORAL at 08:34

## 2022-07-01 VITALS
HEART RATE: 89 BPM | SYSTOLIC BLOOD PRESSURE: 117 MMHG | RESPIRATION RATE: 20 BRPM | DIASTOLIC BLOOD PRESSURE: 80 MMHG | BODY MASS INDEX: 34.52 KG/M2 | WEIGHT: 268.96 LBS | TEMPERATURE: 97.5 F | OXYGEN SATURATION: 99 % | HEIGHT: 74 IN

## 2022-07-01 RX ORDER — TRAZODONE HYDROCHLORIDE 100 MG/1
100 TABLET ORAL NIGHTLY PRN
Qty: 30 TABLET | Refills: 0 | Status: SHIPPED | OUTPATIENT
Start: 2022-07-01

## 2022-07-01 RX ORDER — PANTOPRAZOLE SODIUM 40 MG/1
40 TABLET, DELAYED RELEASE ORAL DAILY
Qty: 30 TABLET | Refills: 0 | Status: SHIPPED | OUTPATIENT
Start: 2022-07-01

## 2022-07-01 RX ORDER — PRAZOSIN HYDROCHLORIDE 1 MG/1
1 CAPSULE ORAL NIGHTLY
Qty: 30 CAPSULE | Refills: 1 | Status: SHIPPED | OUTPATIENT
Start: 2022-07-01

## 2022-07-01 RX ORDER — LITHIUM CARBONATE 450 MG
450 TABLET, EXTENDED RELEASE ORAL 2 TIMES DAILY
Qty: 60 TABLET | Refills: 1 | Status: SHIPPED | OUTPATIENT
Start: 2022-07-01

## 2022-07-01 RX ADMIN — PANTOPRAZOLE SODIUM 40 MG: 40 TABLET, DELAYED RELEASE ORAL at 06:37

## 2022-07-01 RX ADMIN — HYDROXYZINE PAMOATE 50 MG: 50 CAPSULE ORAL at 00:10

## 2022-07-01 RX ADMIN — LITHIUM CARBONATE 450 MG: 450 TABLET ORAL at 09:26

## 2022-07-01 NOTE — PLAN OF CARE
Goal Outcome Evaluation:  Plan of Care Reviewed With: patient  Patient Agreement with Plan of Care: agrees     Progress: improving  Patient has been in dayroom, watching TV and interacting with peers appropriately.  Patient has been more subdued with his emotions, states he feels more able to focus and less excitable and anxious.  Patient rates his anxiety at 0/10, depression 1/10, and denies any hopelessness.  Patient reports joyfully that he talked to his  in [place] and the  and his wife were very glad to here from him as they were worried about how he was doing.  Currently patient states his plan is to get to [place], get a bus from there to [place], and then his  will meet him there and take him home to [place].  Patient states he plans to still remain clean and perhaps find treatments closer to home. Patient denies suicidal thoughts, H/I, and CFS.  Patient denies A/V hallucinations.  Patient requested sleep medication and was able to achieve sleep.  Safe environment provided.

## 2022-07-01 NOTE — PLAN OF CARE
Goal Outcome Evaluation:  Plan of Care Reviewed With: patient  Patient Agreement with Plan of Care: agrees   PATIENT HAS REACHED ALL GOALS AND WILL BE DISCHARGED FROM UNIT TODAY.  PATIENT WILL CONTINUE TREATMENT ON OUTPATIENT BASIS.

## 2022-07-01 NOTE — DISCHARGE SUMMARY
[facility]         DISCHARGE SUMMARY    Patient Name: Linwood Patel  : 2000  MRN: [id number]    Date of Admission: 2022  Date of Discharge: 2022  Primary Care Physician: Provider, No Known    Consults     No orders found from 2022 to 2022.          Presenting Problem:   Depression [F32.A]    Active and Resolved Hospital Problems:  Active Hospital Problems    Diagnosis POA   • Depression [F32.A] Yes   • Post traumatic stress disorder (PTSD) [F43.10] Yes   • Amphetamine misuse [F15.90] Yes   • Tobacco abuse [Z72.0] Yes   • Gastroesophageal reflux disease [K21.9] Yes   • Obstructive sleep apnea [G47.33] Yes   • Obesity [E66.9] Yes      Resolved Hospital Problems   No resolved problems to display.         Hospital Course     Hospital Course:  Linwood Patel is a 22 y.o. male admitted on a voluntary basis for depression with suicidal ideations.  Patient also has a history of substance abuse and was in rehab in the area.  Patient initially from [facility] and was brought to [place] for drug and alcohol rehabilitation and admitted to a sober living facility.  Patient had difficulty at the sober living facility as he went to continue report depression and had suicidal ideations.  Over the past 6 weeks he has had numerous visits to the emergency room and in the past 2 weeks has had 2 admissions here to [facility].    Patient reported depression and also has a history of PTSD.  He was admitted on a regimen of lithium and he reports this medicine works very well for him.  He reports that antidepressants have not worked well in the past.  Lithium was continued and he had no adverse effects from lithium.  He also reported nightmares from his history of PTSD and he was started on prazosin and this helped with nightmares and his sleep improved.    He has not required any medications for acute agitation.  Patient with no support system here in [facility].  He has  failed numerous placements.  Patient does have a girlfriend and 2 children live in the [place] area.  Patient initially thought about going to [facility] where there may be more resources, but he got in touch with a former  current  in [place] who is agreed to come to [place] to take him back to [place].  Patient is being discharged today to go to [place] and on the [place] for treatment for his substance abuse.    Patient's been calm and cooperative.  He reports his mood has been good.  He has been denying suicidal ideation throughout his stay.  He is future and goal directed.  There is no acute anxiety or agitation.  He has been compliant with treatment and appropriate in the milieu.  Patient at this time is suitable for outpatient management and will discharge today      DISCHARGE Follow Up Recommendations for labs and diagnostics: Lithium level, routine labs and health maintenance from primary care, substance use treatment, [facility] for medication management and therapy      Day of Discharge     Vital Signs:  Temp:  [97.4 °F (36.3 °C)-97.5 °F (36.4 °C)] 97.5 °F (36.4 °C)  Heart Rate:  [] 89  Resp:  [18-20] 20  BP: (117-124)/(80-84) 117/80      Pertinent  and/or Most Recent Results     LAB RESULTS:      Lab 06/28/22 2056   WBC 10.55   HEMOGLOBIN 14.9   HEMATOCRIT 44.1   PLATELETS 288   NEUTROS ABS 6.38   IMMATURE GRANS (ABS) 0.03   LYMPHS ABS 3.25*   MONOS ABS 0.64   EOS ABS 0.23   MCV 82.7         Lab 06/28/22 2056   SODIUM 143   POTASSIUM 3.9   CHLORIDE 104   CO2 27.1   ANION GAP 11.9   BUN 13   CREATININE 0.99   EGFR 110.5   GLUCOSE 111*   CALCIUM 10.1         Lab 06/28/22 2056   TOTAL PROTEIN 6.9   ALBUMIN 4.50   GLOBULIN 2.4   ALT (SGPT) 72*   AST (SGOT) 36   BILIRUBIN 0.2   ALK PHOS 97                         Lab 06/28/22 2056   LITHIUM LVL 0.4*               Lab 06/28/22 2056   COVID19 Not Detected           Lab 06/28/22 2129    AMPH/METHAM SCREEN, URINE Negative   BENZODIAZEPINE SCREEN, URINE Negative   COCAINE SCREEN, URINE Negative   OPIATES Negative   THC URINE SCREEN Negative   METHADONE SCREEN, URINE Negative     Brief Urine Lab Results  (Last result in the past 365 days)      Color   Clarity   Blood   Leuk Est   Nitrite   Protein   CREAT   Urine HCG        06/12/22 0932 Yellow   Clear   Negative   Negative   Negative   Negative                      XR Knee 1 or 2 View Right    Result Date: 6/12/2022  Impression:  Negative right knee series.      DAMARI TOMLINSON MD       Electronically Signed and Approved By: DAMARI TOMLINSON MD on 6/12/2022 at 9:20                           Imaging Results (Last 7 Days)     ** No results found for the last 168 hours. **           Labs Pending at Discharge:           Discharge Details        Discharge Medications      Continue These Medications      Instructions Start Date   lithium 450 MG CR tablet  Commonly known as: ESKALITH   450 mg, Oral, 2 Times Daily      pantoprazole 40 MG EC tablet  Commonly known as: PROTONIX   40 mg, Oral, Daily      prazosin 1 MG capsule  Commonly known as: MINIPRESS   1 mg, Oral, Nightly      traZODone 150 MG tablet  Commonly known as: DESYREL   150 mg, Oral, Nightly         Stop These Medications    melatonin 3 MG tablet            Allergies   Allergen Reactions   • Onion Anaphylaxis   • Tomato Itching         Discharge Disposition:  Home or Self Care    Diet:  Hospital:  Diet Order   Procedures   • Diet Regular         Discharge Activity:   Activity Instructions     Activity as Tolerated            Discharge Condition: Good    CODE STATUS:  Code Status and Medical Interventions:   Ordered at: 06/29/22 1029     Code Status (Patient has no pulse and is not breathing):    CPR (Attempt to Resuscitate)     Medical Interventions (Patient has pulse or is breathing):    Full Support         No future appointments.    Additional Instructions for the Follow-ups that You Need to  Schedule     Discharge Follow-up with Specified Provider: Community mental health   As directed      To: Community mental health         Discharge Follow-up with Specified Provider: Drug and alcohol rehabilitation   As directed      To: Drug and alcohol rehabilitation               Time spent on Discharge including face to face service: 30 minutes    Electronically signed by Roger Valdovinos MD, 07/01/22, 10:01 AM EDT.

## 2022-12-26 ENCOUNTER — HOSPITAL ENCOUNTER (EMERGENCY)
Facility: HOSPITAL | Age: 22
Discharge: HOME | End: 2022-12-26
Attending: EMERGENCY MEDICINE | Admitting: EMERGENCY MEDICINE

## 2022-12-26 VITALS
HEIGHT: 74 IN | RESPIRATION RATE: 18 BRPM | SYSTOLIC BLOOD PRESSURE: 115 MMHG | OXYGEN SATURATION: 99 % | HEART RATE: 109 BPM | DIASTOLIC BLOOD PRESSURE: 61 MMHG | BODY MASS INDEX: 27.95 KG/M2 | WEIGHT: 217.81 LBS | TEMPERATURE: 98.8 F

## 2022-12-26 DIAGNOSIS — F99 PSYCHIATRIC COMPLAINT: Primary | ICD-10-CM

## 2022-12-26 LAB
ALBUMIN SERPL-MCNC: 4.5 G/DL (ref 3.5–5.2)
ALBUMIN/GLOB SERPL: 1.7 G/DL
ALP SERPL-CCNC: 105 U/L (ref 39–117)
ALT SERPL W P-5'-P-CCNC: 56 U/L (ref 1–41)
AMPHET+METHAMPHET UR QL: NEGATIVE
ANION GAP SERPL CALCULATED.3IONS-SCNC: 11.2 MMOL/L (ref 5–15)
APAP SERPL-MCNC: <5 MCG/ML (ref 0–30)
AST SERPL-CCNC: 28 U/L (ref 1–40)
BARBITURATES UR QL SCN: NEGATIVE
BASOPHILS # BLD AUTO: 0.02 10*3/MM3 (ref 0–0.2)
BASOPHILS NFR BLD AUTO: 0.2 % (ref 0–1.5)
BENZODIAZ UR QL SCN: NEGATIVE
BILIRUB SERPL-MCNC: 0.3 MG/DL (ref 0–1.2)
BILIRUB UR QL STRIP: NEGATIVE
BUN SERPL-MCNC: 14 MG/DL (ref 6–20)
BUN/CREAT SERPL: 16.5 (ref 7–25)
CALCIUM SPEC-SCNC: 9.7 MG/DL (ref 8.6–10.5)
CANNABINOIDS SERPL QL: NEGATIVE
CHLORIDE SERPL-SCNC: 104 MMOL/L (ref 98–107)
CLARITY UR: CLEAR
CO2 SERPL-SCNC: 22.8 MMOL/L (ref 22–29)
COCAINE UR QL: NEGATIVE
COLOR UR: YELLOW
CREAT SERPL-MCNC: 0.85 MG/DL (ref 0.76–1.27)
DEPRECATED RDW RBC AUTO: 35.6 FL (ref 37–54)
EGFRCR SERPLBLD CKD-EPI 2021: 126 ML/MIN/1.73
EOSINOPHIL # BLD AUTO: 0.11 10*3/MM3 (ref 0–0.4)
EOSINOPHIL NFR BLD AUTO: 1.1 % (ref 0.3–6.2)
ERYTHROCYTE [DISTWIDTH] IN BLOOD BY AUTOMATED COUNT: 12.6 % (ref 12.3–15.4)
ETHANOL BLD-MCNC: <10 MG/DL (ref 0–10)
ETHANOL UR QL: <0.01 %
FLUAV AG NPH QL: NEGATIVE
FLUBV AG NPH QL IA: NEGATIVE
GLOBULIN UR ELPH-MCNC: 2.6 GM/DL
GLUCOSE SERPL-MCNC: 109 MG/DL (ref 65–99)
GLUCOSE UR STRIP-MCNC: NEGATIVE MG/DL
HCT VFR BLD AUTO: 44.6 % (ref 37.5–51)
HGB BLD-MCNC: 15.5 G/DL (ref 13–17.7)
HGB UR QL STRIP.AUTO: NEGATIVE
HOLD SPECIMEN: NORMAL
HOLD SPECIMEN: NORMAL
IMM GRANULOCYTES # BLD AUTO: 0.04 10*3/MM3 (ref 0–0.05)
IMM GRANULOCYTES NFR BLD AUTO: 0.4 % (ref 0–0.5)
KETONES UR QL STRIP: NEGATIVE
LEUKOCYTE ESTERASE UR QL STRIP.AUTO: NEGATIVE
LITHIUM SERPL-SCNC: <0.1 MMOL/L (ref 0.6–1.2)
LYMPHOCYTES # BLD AUTO: 0.94 10*3/MM3 (ref 0.7–3.1)
LYMPHOCYTES NFR BLD AUTO: 9.2 % (ref 19.6–45.3)
MCH RBC QN AUTO: 27.6 PG (ref 26.6–33)
MCHC RBC AUTO-ENTMCNC: 34.8 G/DL (ref 31.5–35.7)
MCV RBC AUTO: 79.4 FL (ref 79–97)
METHADONE UR QL SCN: NEGATIVE
MONOCYTES # BLD AUTO: 0.66 10*3/MM3 (ref 0.1–0.9)
MONOCYTES NFR BLD AUTO: 6.5 % (ref 5–12)
NEUTROPHILS NFR BLD AUTO: 8.42 10*3/MM3 (ref 1.7–7)
NEUTROPHILS NFR BLD AUTO: 82.6 % (ref 42.7–76)
NITRITE UR QL STRIP: NEGATIVE
NRBC BLD AUTO-RTO: 0 /100 WBC (ref 0–0.2)
OPIATES UR QL: NEGATIVE
OXYCODONE UR QL SCN: NEGATIVE
PH UR STRIP.AUTO: 6 [PH] (ref 5–8)
PLATELET # BLD AUTO: 308 10*3/MM3 (ref 140–450)
PMV BLD AUTO: 9.2 FL (ref 6–12)
POTASSIUM SERPL-SCNC: 4.1 MMOL/L (ref 3.5–5.2)
PROT SERPL-MCNC: 7.1 G/DL (ref 6–8.5)
PROT UR QL STRIP: NEGATIVE
RBC # BLD AUTO: 5.62 10*6/MM3 (ref 4.14–5.8)
SALICYLATES SERPL-MCNC: <0.3 MG/DL
SODIUM SERPL-SCNC: 138 MMOL/L (ref 136–145)
SP GR UR STRIP: >=1.03 (ref 1–1.03)
UROBILINOGEN UR QL STRIP: NORMAL
WBC NRBC COR # BLD: 10.19 10*3/MM3 (ref 3.4–10.8)
WHOLE BLOOD HOLD COAG: NORMAL
WHOLE BLOOD HOLD SPECIMEN: NORMAL

## 2022-12-26 PROCEDURE — 99284 EMERGENCY DEPT VISIT MOD MDM: CPT

## 2022-12-26 PROCEDURE — 81003 URINALYSIS AUTO W/O SCOPE: CPT

## 2022-12-26 PROCEDURE — 80307 DRUG TEST PRSMV CHEM ANLYZR: CPT | Performed by: EMERGENCY MEDICINE

## 2022-12-26 PROCEDURE — 87804 INFLUENZA ASSAY W/OPTIC: CPT | Performed by: EMERGENCY MEDICINE

## 2022-12-26 PROCEDURE — 80178 ASSAY OF LITHIUM: CPT | Performed by: EMERGENCY MEDICINE

## 2022-12-26 PROCEDURE — 80053 COMPREHEN METABOLIC PANEL: CPT | Performed by: EMERGENCY MEDICINE

## 2022-12-26 PROCEDURE — 85025 COMPLETE CBC W/AUTO DIFF WBC: CPT | Performed by: EMERGENCY MEDICINE

## 2022-12-26 PROCEDURE — 80143 DRUG ASSAY ACETAMINOPHEN: CPT | Performed by: EMERGENCY MEDICINE

## 2022-12-26 PROCEDURE — 80179 DRUG ASSAY SALICYLATE: CPT | Performed by: EMERGENCY MEDICINE

## 2022-12-26 PROCEDURE — 36415 COLL VENOUS BLD VENIPUNCTURE: CPT

## 2022-12-26 PROCEDURE — 82077 ASSAY SPEC XCP UR&BREATH IA: CPT | Performed by: EMERGENCY MEDICINE

## 2022-12-26 RX ORDER — SODIUM CHLORIDE 0.9 % (FLUSH) 0.9 %
10 SYRINGE (ML) INJECTION AS NEEDED
Status: DISCONTINUED | OUTPATIENT
Start: 2022-12-26 | End: 2022-12-26 | Stop reason: HOSPADM

## 2022-12-26 NOTE — DISCHARGE INSTRUCTIONS
[facility] once discharged from the [facility] department.  They will be able to prescribe you the medications that you need.  You also need to keep your therapy appointment this week.  If it anytime you have any thoughts of hurting yourself or other people, any type of hallucinations both auditory or visual, or feelings of hopelessness and despair please return to the ER immediately.

## 2022-12-26 NOTE — ED PROVIDER NOTES
Room number: 27/27    Chief Complaint: SI    Time: 11:44 AM EST  Arrived by:POCARLIE  History provided by: PT  History is limited by: N/A     History of Present Illness:  Patient is a 22 y.o. year old male that presents to the emergency department with suicidal ideations.  Patient states he believes he started having suicidal thoughts because he has not had his medication for a week and a half.  He was recently transferred from [facility] in [facility] to [facility] in [facility] and then discharged and transferred to [facility] in [facility].  During those transitions his medication, or prescriptions for his medication, were not transferred with him.  He has had suicidal thoughts in the past but has not acted on them.  At this time he does not have a plan and he has no homicidal ideations.  He also denies any type of auditory or visual hallucinations    Patient was originally in [facility] for depression and an alleged suicide attempt.  Patient states he was depressed and was using drugs and overdosed.  He advises he was not trying to kill his self that the overdose was accidental however he was hospitalized because it was perceived as a suicide attempt.    HPI    Similar Symptoms Previously: yes  Recently seen: yes      Patient Care Team  Primary Care Provider: Provider, No Known    Past Medical History:   Allergies   Allergen Reactions   • Onion Anaphylaxis   • Tomato Itching     Past Medical History:   Diagnosis Date   • Anxiety    • Bipolar 1 disorder (HCC)    • Depression    • PTSD (post-traumatic stress disorder)      Past Surgical History:   Procedure Laterality Date   • KNEE ACL RECONSTRUCTION Right      No family history on file.    Home Medications:  Prior to Admission medications    Medication Sig Start Date End Date Taking? Authorizing Provider   lithium (ESKALITH) 450 MG CR tablet Take 1 tablet by mouth 2 (Two)  "Times a Day. Indications: Mood stabilization 7/1/22   Roger Valdovinos MD   pantoprazole (PROTONIX) 40 MG EC tablet Take 1 tablet by mouth Daily. Indications: Gastroesophageal Reflux Disease 7/1/22   Roger Valdovinos MD   prazosin (MINIPRESS) 1 MG capsule Take 1 capsule by mouth Every Night. Indications: Frightening Dreams 7/1/22   Roger Valdovinos MD   traZODone (DESYREL) 100 MG tablet Take 1 tablet by mouth At Night As Needed for Sleep. Indications: Trouble Sleeping 7/1/22   Roger Valdovinos MD        Social History:   Social History     Tobacco Use   • Smoking status: Every Day     Packs/day: 0.50     Types: Cigarettes   • Smokeless tobacco: Never   Substance Use Topics   • Alcohol use: Not Currently     Comment: social   • Drug use: Yes     Types: Methamphetamines     Comment: last use 32 hours ago       Review of Systems  Review of Systems   Constitutional: Negative for chills and fever.   HENT: Negative for congestion, ear pain and sore throat.    Eyes: Negative for pain.   Respiratory: Negative for cough, chest tightness and shortness of breath.    Cardiovascular: Negative for chest pain.   Gastrointestinal: Negative for abdominal pain, diarrhea, nausea and vomiting.   Genitourinary: Negative for flank pain and hematuria.   Musculoskeletal: Negative for joint swelling.   Skin: Negative for pallor.   Neurological: Negative for seizures and headaches.   Psychiatric/Behavioral: Positive for suicidal ideas. Negative for agitation, behavioral problems, confusion, decreased concentration, dysphoric mood, hallucinations and self-injury. The patient is not nervous/anxious and is not hyperactive.    All other systems reviewed and are negative.       Physical Exam:   /80   Pulse 113   Temp 98.8 °F (37.1 °C)   Resp 18   Ht 188 cm (74\")   Wt 98.8 kg (217 lb 13 oz)   SpO2 97%   BMI 27.97 kg/m²     Physical Exam  Vitals and nursing note reviewed.   Constitutional:       General: He is not in acute distress.     Appearance: " Normal appearance. He is not ill-appearing, toxic-appearing or diaphoretic.   HENT:      Head: Normocephalic and atraumatic.      Mouth/Throat:      Mouth: Mucous membranes are moist.   Eyes:      General: No scleral icterus.  Cardiovascular:      Rate and Rhythm: Normal rate and regular rhythm.      Pulses: Normal pulses.      Heart sounds: Normal heart sounds.   Pulmonary:      Effort: Pulmonary effort is normal. No respiratory distress.      Breath sounds: Normal breath sounds. No stridor. No wheezing.   Abdominal:      General: Abdomen is flat. There is no distension.      Palpations: Abdomen is soft.      Tenderness: There is no abdominal tenderness.   Musculoskeletal:         General: No tenderness. Normal range of motion.      Cervical back: Normal range of motion and neck supple.   Skin:     General: Skin is warm and dry.      Coloration: Skin is not jaundiced or pale.      Findings: No bruising, erythema, lesion or rash.   Neurological:      Mental Status: He is alert and oriented to person, place, and time. Mental status is at baseline.      Sensory: No sensory deficit.   Psychiatric:         Mood and Affect: Mood normal.         Behavior: Behavior normal.         Thought Content: Thought content normal.         Judgment: Judgment normal.                Medications in the Emergency Department:  Medications   sodium chloride 0.9 % flush 10 mL (has no administration in time range)        Labs  Lab Results (last 24 hours)     Procedure Component Value Units Date/Time    CBC & Differential [[id number]]  (Abnormal) Collected: 12/26/22 1141    Specimen: Blood Updated: 12/26/22 1155    Narrative:      The following orders were created for panel order CBC & Differential.  Procedure                               Abnormality         Status                     ---------                               -----------         ------                     CBC Auto Differential[[id number]]        Abnormal            Final result                  Please view results for these tests on the individual orders.    Comprehensive Metabolic Panel [[id number]]  (Abnormal) Collected: 12/26/22 1141    Specimen: Blood Updated: 12/26/22 1212     Glucose 109 mg/dL      BUN 14 mg/dL      Creatinine 0.85 mg/dL      Sodium 138 mmol/L      Potassium 4.1 mmol/L      Chloride 104 mmol/L      CO2 22.8 mmol/L      Calcium 9.7 mg/dL      Total Protein 7.1 g/dL      Albumin 4.50 g/dL      ALT (SGPT) 56 U/L      AST (SGOT) 28 U/L      Alkaline Phosphatase 105 U/L      Total Bilirubin 0.3 mg/dL      Globulin 2.6 gm/dL      A/G Ratio 1.7 g/dL      BUN/Creatinine Ratio 16.5     Anion Gap 11.2 mmol/L      eGFR 126.0 mL/min/1.73      Comment: National Kidney Foundation and American Society of Nephrology (ASN) Task Force recommended calculation based on the Chronic Kidney Disease Epidemiology Collaboration (CKD-EPI) equation refit without adjustment for race.       Narrative:      GFR Normal >60  Chronic Kidney Disease <60  Kidney Failure <15      Acetaminophen Level [[id number]]  (Normal) Collected: 12/26/22 1141    Specimen: Blood Updated: 12/26/22 1212     Acetaminophen <5.0 mcg/mL     Ethanol [[id number]] Collected: 12/26/22 1141    Specimen: Blood Updated: 12/26/22 1212     Ethanol <10 mg/dL      Ethanol % <0.010 %     Narrative:      Ethanol (Plasma)  <10 Essentially Negative    Toxic Concentrations           mg/dL    Flushing, slowing of reflexes    Impaired visual activity         Depression of CNS              >100  Possible Coma                  >300       Salicylate Level [[id number]]  (Normal) Collected: 12/26/22 1141    Specimen: Blood Updated: 12/26/22 1212     Salicylate <0.3 mg/dL     Lithium Level [[id number]]  (Abnormal) Collected: 12/26/22 1141    Specimen: Blood Updated: 12/26/22 1222     Lithium <0.1 mmol/L     CBC Auto Differential [[id number]]  (Abnormal) Collected: 12/26/22 1141    Specimen: Blood Updated: 12/26/22 1155     WBC 10.19 10*3/mm3       RBC 5.62 10*6/mm3      Hemoglobin 15.5 g/dL      Hematocrit 44.6 %      MCV 79.4 fL      MCH 27.6 pg      MCHC 34.8 g/dL      RDW 12.6 %      RDW-SD 35.6 fl      MPV 9.2 fL      Platelets 308 10*3/mm3      Neutrophil % 82.6 %      Lymphocyte % 9.2 %      Monocyte % 6.5 %      Eosinophil % 1.1 %      Basophil % 0.2 %      Immature Grans % 0.4 %      Neutrophils, Absolute 8.42 10*3/mm3      Lymphocytes, Absolute 0.94 10*3/mm3      Monocytes, Absolute 0.66 10*3/mm3      Eosinophils, Absolute 0.11 10*3/mm3      Basophils, Absolute 0.02 10*3/mm3      Immature Grans, Absolute 0.04 10*3/mm3      nRBC 0.0 /100 WBC     Urine Drug Screen - Urine, Clean Catch [[id number]]  (Normal) Collected: 12/26/22 1142    Specimen: Urine, Clean Catch Updated: 12/26/22 1216     Amphet/Methamphet, Screen Negative     Barbiturates Screen, Urine Negative     Benzodiazepine Screen, Urine Negative     Cocaine Screen, Urine Negative     Opiate Screen Negative     THC, Screen, Urine Negative     Methadone Screen, Urine Negative     Oxycodone Screen, Urine Negative    Narrative:      Negative Thresholds Per Drugs Screened:    Amphetamines                 500 ng/ml  Barbiturates                 200 ng/ml  Benzodiazepines              100 ng/ml  Cocaine                      300 ng/ml  Methadone                    300 ng/ml  Opiates                      300 ng/ml  Oxycodone                    100 ng/ml  THC                           50 ng/ml    The Normal Value for all drugs tested is negative. This report includes final unconfirmed screening results to be used for medical treatment purposes only. Unconfirmed results must not be used for non-medical purposes such as employment or legal testing. Clinical consideration should be applied to any drug of abuse test, particularly when unconfirmed results are used.            Influenza Antigen, Rapid - Swab, Nasopharynx [[id number]]  (Normal) Collected: 12/26/22 1414    Specimen: Swab from Nasopharynx  Updated: 12/26/22 1438     Influenza A Ag, EIA Negative     Influenza B Ag, EIA Negative           Imaging:  No Radiology Exams Resulted Within Past 24 Hours    Procedures:  Procedures    Progress  ED Course as of 12/26/22 1615   Mon Dec 26, 2022   1214 Pharmacy tech is attempting to contact the [facility]  [MS]   1230 Tiffani spoke with [facility] to confirm that patient will have a bed in to see if patient's appropriate meds could be administered and prescribed if he would be allowed to come back to [facility] or if he still need to be cleared back [facility] for his suicidal ideations.  They advised her that patient related a different story to them.  PT told the staff at [facility] that he did have a plan, that he was going to cut his throat. [MS]   1321 NORRIS [facility]ani [facility] who will completing an assessment via phone.  [MS]   0613 Chelsea with [facility] has completed her evaluation and feels that pt is safe to go back to [facility].  Patient told Chelsea that he has an appointment with his therapist tomorrow. She feels pt has a good support system and pt also vocalized that the people at [facility] are nice to him. Please see her note.     [facility] is also capable of managing his medication. [MS]      ED Course User Index  [MS] Roseann Soria, INDRA                            Medical Decision Making:  MDM  Number of Diagnoses or Management Options  Psychiatric complaint: new and does not require workup  Diagnosis management comments: I have spoke with the patient and I have explained the patient´s condition, diagnoses and treatment plan based on the information available to me at this time. I have answered all questions and addressed any concerns. The patient has a good understanding of the patient´s diagnosis, condition, and treatment plan as can be expected at this point. The vital signs have been stable. The patient´s condition is stable and appropriate for discharge  from the emergency department.      The patient will pursue further outpatient evaluation with the primary care physician or other designated or consulting physician as outlined in the discharge instructions. They are agreeable to this plan of care and follow-up instructions have been explained in detail. The patient has received these instructions in written format and have expressed an understanding of the discharge instructions. The patient is aware that any significant change in condition or worsening of symptoms should prompt an immediate return to this or the closest emergency department or call to 911.         Amount and/or Complexity of Data Reviewed  Decide to obtain previous medical records or to obtain history from someone other than the patient: yes (Obtained pt's medication/MAR from the facility that patient was at prior to going to [facility].  [place]  Review and summarize past medical records: yes (I have personally reviewed patient's previous medical encounters.  )  Discuss the patient with other providers: yes (I consulted [facility] (Chelsea) about this pt. She did a psych evaluation via phone. )    Risk of Complications, Morbidity, and/or Mortality  Presenting problems: high  Diagnostic procedures: low  Management options: moderate         Final diagnoses:   Psychiatric complaint        Disposition:  ED Disposition     ED Disposition   Discharge    Condition   Stable    Comment   --             Prescriptions:       Medication List      CONTINUE taking these medications    lithium 450 MG CR tablet  Commonly known as: ESKALITH  Take 1 tablet by mouth 2 (Two) Times a Day. Indications: Mood stabilization     pantoprazole 40 MG EC tablet  Commonly known as: PROTONIX  Take 1 tablet by mouth Daily. Indications: Gastroesophageal Reflux Disease     prazosin 1 MG capsule  Commonly known as: MINIPRESS  Take 1 capsule by mouth Every Night. Indications: Frightening  Dreams     traZODone 100 MG tablet  Commonly known as: DESYREL  Take 1 tablet by mouth At Night As Needed for Sleep. Indications: Trouble Sleeping            This medical record created using voice recognition software and may contain unintended errors.     Roseann Soria, APRN  12/26/22 3206

## 2023-01-01 ENCOUNTER — HOSPITAL ENCOUNTER (EMERGENCY)
Facility: HOSPITAL | Age: 23
Discharge: PSYCHIATRIC OR SUBSTANCE USE FACILITY | End: 2023-01-02
Attending: EMERGENCY MEDICINE | Admitting: EMERGENCY MEDICINE
Payer: MEDICAID

## 2023-01-01 VITALS
DIASTOLIC BLOOD PRESSURE: 94 MMHG | HEART RATE: 84 BPM | HEIGHT: 74 IN | TEMPERATURE: 97.5 F | RESPIRATION RATE: 16 BRPM | SYSTOLIC BLOOD PRESSURE: 132 MMHG | OXYGEN SATURATION: 97 % | WEIGHT: 252.21 LBS | BODY MASS INDEX: 32.37 KG/M2

## 2023-01-01 DIAGNOSIS — R45.851 SUICIDAL IDEATION: Primary | ICD-10-CM

## 2023-01-01 LAB
ALBUMIN SERPL-MCNC: 4 G/DL (ref 3.5–5.2)
ALBUMIN/GLOB SERPL: 1.4 G/DL
ALP SERPL-CCNC: 89 U/L (ref 39–117)
ALT SERPL W P-5'-P-CCNC: 64 U/L (ref 1–41)
AMPHET+METHAMPHET UR QL: NEGATIVE
ANION GAP SERPL CALCULATED.3IONS-SCNC: 9.6 MMOL/L (ref 5–15)
APAP SERPL-MCNC: <5 MCG/ML (ref 0–30)
AST SERPL-CCNC: 29 U/L (ref 1–40)
BARBITURATES UR QL SCN: NEGATIVE
BASOPHILS # BLD AUTO: 0.01 10*3/MM3 (ref 0–0.2)
BASOPHILS NFR BLD AUTO: 0.1 % (ref 0–1.5)
BENZODIAZ UR QL SCN: NEGATIVE
BILIRUB SERPL-MCNC: 0.3 MG/DL (ref 0–1.2)
BUN SERPL-MCNC: 14 MG/DL (ref 6–20)
BUN/CREAT SERPL: 14.9 (ref 7–25)
CALCIUM SPEC-SCNC: 9.1 MG/DL (ref 8.6–10.5)
CANNABINOIDS SERPL QL: NEGATIVE
CHLORIDE SERPL-SCNC: 105 MMOL/L (ref 98–107)
CO2 SERPL-SCNC: 26.4 MMOL/L (ref 22–29)
COCAINE UR QL: NEGATIVE
CREAT SERPL-MCNC: 0.94 MG/DL (ref 0.76–1.27)
DEPRECATED RDW RBC AUTO: 35.6 FL (ref 37–54)
EGFRCR SERPLBLD CKD-EPI 2021: 117.5 ML/MIN/1.73
EOSINOPHIL # BLD AUTO: 0.14 10*3/MM3 (ref 0–0.4)
EOSINOPHIL NFR BLD AUTO: 1.9 % (ref 0.3–6.2)
ERYTHROCYTE [DISTWIDTH] IN BLOOD BY AUTOMATED COUNT: 12.4 % (ref 12.3–15.4)
ETHANOL BLD-MCNC: <10 MG/DL (ref 0–10)
ETHANOL UR QL: <0.01 %
GLOBULIN UR ELPH-MCNC: 2.8 GM/DL
GLUCOSE SERPL-MCNC: 102 MG/DL (ref 65–99)
HCT VFR BLD AUTO: 44.2 % (ref 37.5–51)
HGB BLD-MCNC: 15.4 G/DL (ref 13–17.7)
HOLD SPECIMEN: NORMAL
HOLD SPECIMEN: NORMAL
IMM GRANULOCYTES # BLD AUTO: 0.02 10*3/MM3 (ref 0–0.05)
IMM GRANULOCYTES NFR BLD AUTO: 0.3 % (ref 0–0.5)
LITHIUM SERPL-SCNC: 0.2 MMOL/L (ref 0.6–1.2)
LYMPHOCYTES # BLD AUTO: 2.51 10*3/MM3 (ref 0.7–3.1)
LYMPHOCYTES NFR BLD AUTO: 34.4 % (ref 19.6–45.3)
MCH RBC QN AUTO: 27.8 PG (ref 26.6–33)
MCHC RBC AUTO-ENTMCNC: 34.8 G/DL (ref 31.5–35.7)
MCV RBC AUTO: 79.8 FL (ref 79–97)
METHADONE UR QL SCN: NEGATIVE
MONOCYTES # BLD AUTO: 0.45 10*3/MM3 (ref 0.1–0.9)
MONOCYTES NFR BLD AUTO: 6.2 % (ref 5–12)
NEUTROPHILS NFR BLD AUTO: 4.16 10*3/MM3 (ref 1.7–7)
NEUTROPHILS NFR BLD AUTO: 57.1 % (ref 42.7–76)
NRBC BLD AUTO-RTO: 0 /100 WBC (ref 0–0.2)
OPIATES UR QL: NEGATIVE
OXYCODONE UR QL SCN: NEGATIVE
PLATELET # BLD AUTO: 282 10*3/MM3 (ref 140–450)
PMV BLD AUTO: 8.9 FL (ref 6–12)
POTASSIUM SERPL-SCNC: 3.9 MMOL/L (ref 3.5–5.2)
PROT SERPL-MCNC: 6.8 G/DL (ref 6–8.5)
RBC # BLD AUTO: 5.54 10*6/MM3 (ref 4.14–5.8)
SALICYLATES SERPL-MCNC: 0.6 MG/DL
SARS-COV-2 RNA RESP QL NAA+PROBE: NOT DETECTED
SODIUM SERPL-SCNC: 141 MMOL/L (ref 136–145)
WBC NRBC COR # BLD: 7.29 10*3/MM3 (ref 3.4–10.8)
WHOLE BLOOD HOLD COAG: NORMAL
WHOLE BLOOD HOLD SPECIMEN: NORMAL

## 2023-01-01 PROCEDURE — 80307 DRUG TEST PRSMV CHEM ANLYZR: CPT

## 2023-01-01 PROCEDURE — 85025 COMPLETE CBC W/AUTO DIFF WBC: CPT

## 2023-01-01 PROCEDURE — C9803 HOPD COVID-19 SPEC COLLECT: HCPCS

## 2023-01-01 PROCEDURE — 80178 ASSAY OF LITHIUM: CPT | Performed by: PHYSICIAN ASSISTANT

## 2023-01-01 PROCEDURE — 63710000001 ONDANSETRON ODT 4 MG TABLET DISPERSIBLE: Performed by: PHYSICIAN ASSISTANT

## 2023-01-01 PROCEDURE — 82077 ASSAY SPEC XCP UR&BREATH IA: CPT

## 2023-01-01 PROCEDURE — 99284 EMERGENCY DEPT VISIT MOD MDM: CPT

## 2023-01-01 PROCEDURE — 80179 DRUG ASSAY SALICYLATE: CPT

## 2023-01-01 PROCEDURE — 80143 DRUG ASSAY ACETAMINOPHEN: CPT

## 2023-01-01 PROCEDURE — 36415 COLL VENOUS BLD VENIPUNCTURE: CPT

## 2023-01-01 PROCEDURE — U0003 INFECTIOUS AGENT DETECTION BY NUCLEIC ACID (DNA OR RNA); SEVERE ACUTE RESPIRATORY SYNDROME CORONAVIRUS 2 (SARS-COV-2) (CORONAVIRUS DISEASE [COVID-19]), AMPLIFIED PROBE TECHNIQUE, MAKING USE OF HIGH THROUGHPUT TECHNOLOGIES AS DESCRIBED BY CMS-2020-01-R: HCPCS | Performed by: EMERGENCY MEDICINE

## 2023-01-01 PROCEDURE — 80053 COMPREHEN METABOLIC PANEL: CPT

## 2023-01-01 RX ORDER — PRAZOSIN HYDROCHLORIDE 1 MG/1
1 CAPSULE ORAL NIGHTLY PRN
Status: DISCONTINUED | OUTPATIENT
Start: 2023-01-01 | End: 2023-01-02 | Stop reason: HOSPADM

## 2023-01-01 RX ORDER — SODIUM CHLORIDE 0.9 % (FLUSH) 0.9 %
10 SYRINGE (ML) INJECTION AS NEEDED
Status: DISCONTINUED | OUTPATIENT
Start: 2023-01-01 | End: 2023-01-02 | Stop reason: HOSPADM

## 2023-01-01 RX ORDER — LITHIUM CARBONATE 450 MG
450 TABLET, EXTENDED RELEASE ORAL EVERY 12 HOURS SCHEDULED
Status: DISCONTINUED | OUTPATIENT
Start: 2023-01-01 | End: 2023-01-02 | Stop reason: HOSPADM

## 2023-01-01 RX ORDER — ONDANSETRON 4 MG/1
4 TABLET, ORALLY DISINTEGRATING ORAL ONCE
Status: COMPLETED | OUTPATIENT
Start: 2023-01-01 | End: 2023-01-01

## 2023-01-01 RX ORDER — TRAZODONE HYDROCHLORIDE 100 MG/1
100 TABLET ORAL NIGHTLY PRN
Status: DISCONTINUED | OUTPATIENT
Start: 2023-01-01 | End: 2023-01-02 | Stop reason: HOSPADM

## 2023-01-01 RX ADMIN — ONDANSETRON 4 MG: 4 TABLET, ORALLY DISINTEGRATING ORAL at 23:40

## 2023-01-01 NOTE — ED PROVIDER NOTES
Time: 6:44 PM EST  Date of encounter:  1/1/2023  Independent Historian/Clinical History and Information was obtained by:   Patient  Chief Complaint   Patient presents with   • Suicidal       History is limited by: N/A    History of Present Illness:  Patient is a 22 y.o. year old male who presents to the emergency department for evaluation of suicidal ideations. The patient is currently at [facility] and unable to get his medications which is making his SI worse.  The patient reports that he would like to slit his throat.  States he had no evidence of a month ago.  He denies any homicidal ideations, hallucinations.  No other complaints at this time.  The patient states that he was prescribed a benzodiazepine which [facility] will not fill.  He is receiving all other medications and taking as prescribed.    HPI    Patient Care Team  Primary Care Provider: Provider, No Known    Past Medical History:     Allergies   Allergen Reactions   • Fish-Derived Products Anaphylaxis   • Onion Anaphylaxis   • Tomato Itching     Past Medical History:   Diagnosis Date   • Anxiety    • Bipolar 1 disorder (HCC)    • Depression    • PTSD (post-traumatic stress disorder)      Past Surgical History:   Procedure Laterality Date   • KNEE ACL RECONSTRUCTION Right      History reviewed. No pertinent family history.    Home Medications:  Prior to Admission medications    Medication Sig Start Date End Date Taking? Authorizing Provider   lithium (ESKALITH) 450 MG CR tablet Take 1 tablet by mouth 2 (Two) Times a Day. Indications: Mood stabilization 7/1/22   Roger Valdovinos MD   pantoprazole (PROTONIX) 40 MG EC tablet Take 1 tablet by mouth Daily. Indications: Gastroesophageal Reflux Disease 7/1/22   Roger Valdovinos MD   prazosin (MINIPRESS) 1 MG capsule Take 1 capsule by mouth Every Night. Indications: Frightening Dreams 7/1/22   Roger Valdovinos MD   traZODone (DESYREL) 100 MG tablet Take 1 tablet by mouth At Night As Needed for Sleep. Indications: Trouble  Sleeping 7/1/22   Roger Valdovinos MD        Social History:   Social History     Tobacco Use   • Smoking status: Every Day     Packs/day: 0.50     Types: Cigarettes   • Smokeless tobacco: Never   Vaping Use   • Vaping Use: Every day   • Substances: Nicotine, Flavoring   Substance Use Topics   • Alcohol use: Not Currently     Comment: social   • Drug use: Yes     Types: Methamphetamines     Comment: last use 3 weeks ago         Review of Systems:  Review of Systems   Constitutional: Negative for chills and fever.   HENT: Negative for congestion and sore throat.    Respiratory: Negative for cough and shortness of breath.    Cardiovascular: Negative for chest pain and palpitations.   Gastrointestinal: Negative for abdominal pain, diarrhea, nausea and vomiting.   Genitourinary: Negative for dysuria.   Neurological: Negative for headaches.   Psychiatric/Behavioral: Positive for suicidal ideas. Negative for hallucinations and self-injury.   All other systems reviewed and are negative.       Physical Exam:  /94 (BP Location: Right arm, Patient Position: Lying)   Pulse 84   Temp 97.5 °F (36.4 °C) (Oral)   Resp 16   Ht 188 cm (74\")   Wt 114 kg (252 lb 3.3 oz)   SpO2 97%   BMI 32.38 kg/m²     Physical Exam  Constitutional:       Appearance: Normal appearance.   HENT:      Head: Normocephalic.   Eyes:      Extraocular Movements: Extraocular movements intact.      Conjunctiva/sclera: Conjunctivae normal.   Pulmonary:      Effort: Pulmonary effort is normal.   Abdominal:      General: There is no distension.   Skin:     General: Skin is warm.      Coloration: Skin is not cyanotic.   Neurological:      Mental Status: He is alert and oriented to person, place, and time.   Psychiatric:         Attention and Perception: Attention and perception normal.         Mood and Affect: Mood normal.         Thought Content: Thought content includes suicidal ideation.                  Procedures:  Procedures      Medical Decision  Making:      Comorbidities that affect care:    bipolar 1 disorder, depression, anxiety, PTSD    External Notes reviewed:    Previous Admission Note      The following orders were placed and all results were independently analyzed by me:  Orders Placed This Encounter   Procedures   • COVID-19,CEPHEID/GEOFFREY,COR/FLORIDALMA/PAD/SEFERINO/MAD IN-HOUSE(OR EMERGENT/ADD-ON),NP SWAB IN TRANSPORT MEDIA 3-4 HR TAT, RT-PCR - Swab, Nasopharynx   • [place] Draw   • Comprehensive Metabolic Panel   • Acetaminophen Level   • Ethanol   • Salicylate Level   • Urine Drug Screen - Urine, Clean Catch   • CBC Auto Differential   • Lithium Level   • Continuous Pulse Oximetry   • Vital Signs   • Undress & Gown   • POC Glucose Once   • CBC & Differential   • Green Top (Gel)   • Lavender Top   • Gold Top - SST   • Light Blue Top       Medications Given in the Emergency Department:  Medications   ondansetron ODT (ZOFRAN-ODT) disintegrating tablet 4 mg (4 mg Oral Given 1/1/23 2340)        ED Course:    The patient was initially evaluated in the triage area where orders were placed. The patient was later dispositioned by Elizabeth Gatica PA-C.      The patient was advised to stay for completion of workup which includes but is not limited to communication of labs and radiological results, reassessment and plan. The patient was advised that leaving prior to disposition by a provider could result in critical findings that are not communicated to the patient.          Labs:    Lab Results (last 24 hours)     ** No results found for the last 24 hours. **           Imaging:    No Radiology Exams Resulted Within Past 24 Hours              All labs were reviewed and analyzed by me.    MDM             Patient Care Considerations:    None      Consultants/Shared Management Plan:    Consult with Dr. Vergara of psychiatry.  No bed availability at this time at [facility].    Consult with Chelsea Herrera with [facility] pt with suicidal ideations of slitting his throat  and has access to a razor. Reported that it was lithium that was actually the med they wont allow him to take. Recommendation is for inpatient higher level of care.     Pt accepted at [facility]  Social Determinants of Health:    Patient is homeless. Nursing staff was instructed to give patient adequate resources to care access.       Disposition and Care Coordination:    Transferred: Through independent evaluation of the patient's history, physical, and imperical data, the patient meets criteria to be transferred to another hospital for evaluation/admission.        Final diagnoses:   Suicidal ideation        ED Disposition     ED Disposition   DC/Transfer to Behavioral Health    Condition   Stable    Comment   --             This medical record created using voice recognition software.           Elizabeth Gatica PA-C  01/05/23 2116

## 2023-01-02 ENCOUNTER — HOSPITAL ENCOUNTER (EMERGENCY)
Facility: HOSPITAL | Age: 23
Discharge: ANOTHER HOSPITAL | End: 2023-01-02
Attending: EMERGENCY MEDICINE | Admitting: EMERGENCY MEDICINE
Payer: MEDICAID

## 2023-01-02 ENCOUNTER — APPOINTMENT (OUTPATIENT)
Dept: CT IMAGING | Facility: HOSPITAL | Age: 23
End: 2023-01-02
Payer: MEDICAID

## 2023-01-02 VITALS
OXYGEN SATURATION: 94 % | HEART RATE: 88 BPM | RESPIRATION RATE: 16 BRPM | HEIGHT: 74 IN | TEMPERATURE: 98.2 F | BODY MASS INDEX: 32.25 KG/M2 | DIASTOLIC BLOOD PRESSURE: 65 MMHG | WEIGHT: 251.32 LBS | SYSTOLIC BLOOD PRESSURE: 100 MMHG

## 2023-01-02 DIAGNOSIS — S00.93XA CONTUSION OF HEAD, UNSPECIFIED PART OF HEAD, INITIAL ENCOUNTER: Primary | ICD-10-CM

## 2023-01-02 PROCEDURE — 70450 CT HEAD/BRAIN W/O DYE: CPT

## 2023-01-02 PROCEDURE — 99283 EMERGENCY DEPT VISIT LOW MDM: CPT

## 2023-01-02 RX ADMIN — TRAZODONE HYDROCHLORIDE 100 MG: 100 TABLET ORAL at 01:08

## 2023-01-02 RX ADMIN — PRAZOSIN HYDROCHLORIDE 1 MG: 1 CAPSULE ORAL at 01:08

## 2023-01-02 RX ADMIN — LITHIUM CARBONATE 450 MG: 450 TABLET ORAL at 01:08

## 2023-01-02 NOTE — ED PROVIDER NOTES
Time: 5:42 AM EST  Date of encounter:  1/2/2023  Independent Historian/Clinical History and Information was obtained by:   Patient and EMS  Chief Complaint: Fall with head injury    History is limited by: N/A    History of Present Illness:  Patient is a 22 y.o. year old male who presents to the emergency department for evaluation of fall with head injury    Patient recently transferred from emergency department to [facility] for suicidal ideation.  Upon arrival there patient was getting admitted and fell hitting his head so they sent him back for medical clearance      History provided by:  Patient and EMS personnel  Head Injury  Location:  Occipital  Time since incident:  20 minutes  Mechanism of injury: fall    Fall:     Fall occurred:  Standing    Height of fall:  Standing    Impact surface:  Hard floor    Point of impact:  Head    Entrapped after fall: no    Pain details:     Quality:  Dull    Severity:  Mild (1/10)    Duration:  20 minutes    Timing:  Constant    Progression:  Unchanged  Chronicity:  New  Relieved by:  Nothing  Worsened by:  Nothing  Ineffective treatments:  None tried  Associated symptoms: no blurred vision, no disorientation, no double vision, no focal weakness, no headaches, no hearing loss, no loss of consciousness, no memory loss, no nausea, no neck pain, no numbness, no seizures and no vomiting        Patient Care Team  Primary Care Provider: Provider, No Known    Past Medical History:     Allergies   Allergen Reactions   • Fish-Derived Products Anaphylaxis   • Onion Anaphylaxis   • Tomato Itching     Past Medical History:   Diagnosis Date   • Anxiety    • Bipolar 1 disorder (HCC)    • Depression    • PTSD (post-traumatic stress disorder)      Past Surgical History:   Procedure Laterality Date   • KNEE ACL RECONSTRUCTION Right      History reviewed. No pertinent family history.    Home Medications:  Prior to Admission medications    Medication Sig Start Date End Date Taking?  Authorizing Provider   lithium (ESKALITH) 450 MG CR tablet Take 1 tablet by mouth 2 (Two) Times a Day. Indications: Mood stabilization 7/1/22   Roger Valdovinos MD   pantoprazole (PROTONIX) 40 MG EC tablet Take 1 tablet by mouth Daily. Indications: Gastroesophageal Reflux Disease 7/1/22   Roger Valdovinos MD   prazosin (MINIPRESS) 1 MG capsule Take 1 capsule by mouth Every Night. Indications: Frightening Dreams 7/1/22   Roger Valdovinos MD   traZODone (DESYREL) 100 MG tablet Take 1 tablet by mouth At Night As Needed for Sleep. Indications: Trouble Sleeping 7/1/22   Roger Valdovinos MD        Social History:   Social History     Tobacco Use   • Smoking status: Every Day     Packs/day: 0.50     Types: Cigarettes   • Smokeless tobacco: Never   Vaping Use   • Vaping Use: Every day   • Substances: Nicotine, Flavoring   Substance Use Topics   • Alcohol use: Not Currently     Comment: social   • Drug use: Yes     Types: Methamphetamines     Comment: last use 3 weeks ago         Review of Systems:  Review of Systems   Constitutional: Negative.    HENT: Negative for hearing loss.    Eyes: Negative.  Negative for blurred vision and double vision.   Cardiovascular: Negative for chest pain.   Gastrointestinal: Negative for abdominal pain, nausea and vomiting.   Genitourinary: Negative for flank pain.   Musculoskeletal: Negative for back pain and neck pain.   Skin: Negative for wound.   Neurological: Negative for dizziness, tremors, focal weakness, seizures, loss of consciousness, syncope, speech difficulty, weakness, light-headedness, numbness and headaches.   Psychiatric/Behavioral: Negative for memory loss.        Physical Exam:  /65   Pulse 88   Temp 98.2 °F (36.8 °C) (Oral)   Resp 16   Ht 188 cm (74\")   Wt 114 kg (251 lb 5.2 oz)   SpO2 94%   BMI 32.27 kg/m²     Physical Exam  Vitals and nursing note reviewed.   Constitutional:       General: He is not in acute distress.     Appearance: Normal appearance. He is not  toxic-appearing.   HENT:      Head: Normocephalic and atraumatic.      Comments: No tenderness on palpation to head.  No swelling or injury noted     Right Ear: Tympanic membrane, ear canal and external ear normal.      Left Ear: Tympanic membrane, ear canal and external ear normal.      Nose: Nose normal.      Mouth/Throat:      Mouth: Mucous membranes are moist.   Eyes:      Extraocular Movements: Extraocular movements intact.      Conjunctiva/sclera: Conjunctivae normal.      Pupils: Pupils are equal, round, and reactive to light.   Cardiovascular:      Rate and Rhythm: Normal rate and regular rhythm.      Pulses: Normal pulses.      Heart sounds: Normal heart sounds.   Pulmonary:      Effort: Pulmonary effort is normal.      Breath sounds: Normal breath sounds.   Abdominal:      General: Bowel sounds are normal.      Palpations: Abdomen is soft.      Tenderness: There is no abdominal tenderness. There is no right CVA tenderness or left CVA tenderness.   Musculoskeletal:         General: Normal range of motion.      Cervical back: Normal range of motion. No tenderness.   Skin:     General: Skin is warm and dry.   Neurological:      General: No focal deficit present.      Mental Status: He is alert and oriented to person, place, and time.      Cranial Nerves: No cranial nerve deficit.      Sensory: No sensory deficit.      Motor: No weakness.   Psychiatric:         Behavior: Behavior normal.                  Procedures:  Procedures      Medical Decision Making:      Comorbidities that affect care:    None    External Notes reviewed:    Previous ED Note      The following orders were placed and all results were independently analyzed by me:  Orders Placed This Encounter   Procedures   • CT Head Without Contrast   • Suicide Precautions       Medications Given in the Emergency Department:  Medications - No data to display     ED Course:         Labs:    Lab Results (last 24 hours)     Procedure Component Value Units  Date/Time    CBC & Differential [[id number]]  (Abnormal) Collected: 01/01/23 1730    Specimen: Blood Updated: 01/01/23 1747    Narrative:      The following orders were created for panel order CBC & Differential.  Procedure                               Abnormality         Status                     ---------                               -----------         ------                     CBC Auto Differential[[id number]]        Abnormal            Final result                 Please view results for these tests on the individual orders.    Comprehensive Metabolic Panel [[id number]]  (Abnormal) Collected: 01/01/23 1730    Specimen: Blood Updated: 01/01/23 1819     Glucose 102 mg/dL      BUN 14 mg/dL      Creatinine 0.94 mg/dL      Sodium 141 mmol/L      Potassium 3.9 mmol/L      Chloride 105 mmol/L      CO2 26.4 mmol/L      Calcium 9.1 mg/dL      Total Protein 6.8 g/dL      Albumin 4.0 g/dL      ALT (SGPT) 64 U/L      AST (SGOT) 29 U/L      Alkaline Phosphatase 89 U/L      Total Bilirubin 0.3 mg/dL      Globulin 2.8 gm/dL      A/G Ratio 1.4 g/dL      BUN/Creatinine Ratio 14.9     Anion Gap 9.6 mmol/L      eGFR 117.5 mL/min/1.73      Comment: National Kidney Foundation and American Society of Nephrology (ASN) Task Force recommended calculation based on the Chronic Kidney Disease Epidemiology Collaboration (CKD-EPI) equation refit without adjustment for race.       Narrative:      GFR Normal >60  Chronic Kidney Disease <60  Kidney Failure <15      Acetaminophen Level [[id number]]  (Normal) Collected: 01/01/23 1730    Specimen: Blood Updated: 01/01/23 1819     Acetaminophen <5.0 mcg/mL     Ethanol [[id number]] Collected: 01/01/23 1730    Specimen: Blood Updated: 01/01/23 1819     Ethanol <10 mg/dL      Ethanol % <0.010 %     Narrative:      Ethanol (Plasma)  <10 Essentially Negative    Toxic Concentrations           mg/dL    Flushing, slowing of reflexes    Impaired visual activity         Depression of CNS               >100  Possible Coma                  >300       Salicylate Level [[id number]]  (Normal) Collected: 01/01/23 1730    Specimen: Blood Updated: 01/01/23 1825     Salicylate 0.6 mg/dL     CBC Auto Differential [[id number]]  (Abnormal) Collected: 01/01/23 1730    Specimen: Blood Updated: 01/01/23 1747     WBC 7.29 10*3/mm3      RBC 5.54 10*6/mm3      Hemoglobin 15.4 g/dL      Hematocrit 44.2 %      MCV 79.8 fL      MCH 27.8 pg      MCHC 34.8 g/dL      RDW 12.4 %      RDW-SD 35.6 fl      MPV 8.9 fL      Platelets 282 10*3/mm3      Neutrophil % 57.1 %      Lymphocyte % 34.4 %      Monocyte % 6.2 %      Eosinophil % 1.9 %      Basophil % 0.1 %      Immature Grans % 0.3 %      Neutrophils, Absolute 4.16 10*3/mm3      Lymphocytes, Absolute 2.51 10*3/mm3      Monocytes, Absolute 0.45 10*3/mm3      Eosinophils, Absolute 0.14 10*3/mm3      Basophils, Absolute 0.01 10*3/mm3      Immature Grans, Absolute 0.02 10*3/mm3      nRBC 0.0 /100 WBC     Lithium Level [[id number]]  (Abnormal) Collected: 01/01/23 1730    Specimen: Blood Updated: 01/01/23 2137     Lithium 0.2 mmol/L     Urine Drug Screen - Urine, Clean Catch [[id number]]  (Normal) Collected: 01/01/23 1848    Specimen: Urine, Clean Catch Updated: 01/01/23 1919     Amphet/Methamphet, Screen Negative     Barbiturates Screen, Urine Negative     Benzodiazepine Screen, Urine Negative     Cocaine Screen, Urine Negative     Opiate Screen Negative     THC, Screen, Urine Negative     Methadone Screen, Urine Negative     Oxycodone Screen, Urine Negative    Narrative:      Negative Thresholds Per Drugs Screened:    Amphetamines                 500 ng/ml  Barbiturates                 200 ng/ml  Benzodiazepines              100 ng/ml  Cocaine                      300 ng/ml  Methadone                    300 ng/ml  Opiates                      300 ng/ml  Oxycodone                    100 ng/ml  THC                           50 ng/ml    The Normal Value for all drugs tested is negative. This  report includes final unconfirmed screening results to be used for medical treatment purposes only. Unconfirmed results must not be used for non-medical purposes such as employment or legal testing. Clinical consideration should be applied to any drug of abuse test, particularly when unconfirmed results are used.            COVID-19,APTIMA PANTHER(CHRISTINA),BH HEATHER/BH SEFERINO, NP/OP SWAB IN UTM/VTM/SALINE TRANSPORT MEDIA,24 HR TAT - Swab, Nasal Cavity [[id number]] Collected: 01/01/23 2305    Specimen: Swab from Nasal Cavity Updated: 01/01/23 2308    COVID-19,CEPHEID/GEOFFREY,COR/FLORIDALMA/PAD/SEFERINO/MAD IN-HOUSE(OR EMERGENT/ADD-ON),NP SWAB IN TRANSPORT MEDIA 3-4 HR TAT, RT-PCR - Swab, Nasopharynx [[id number]]  (Normal) Collected: 01/01/23 2309    Specimen: Swab from Nasopharynx Updated: 01/01/23 2350     COVID19 Not Detected    Narrative:      Fact sheet for providers: https://www.fda.gov/media/262756/download     Fact sheet for patients: https://www.fda.gov/media/552052/download  Fact sheet for providers: https://www.fda.gov/media/795416/download     Fact sheet for patients: https://www.fda.gov/media/533266/download           Imaging:    CT Head Without Contrast    Result Date: 1/2/2023  PROCEDURE: CT HEAD WO CONTRAST  COMPARISON: None.  INDICATIONS: passed out; syncope/collapse; h/o fall; hit back of head  PROTOCOL:   Standard CT imaging protocol performed.    RADIATION:   Total DLP: 1,145.2 mGy*cm.   MA and/or KV were/was adjusted to minimize radiation dose.    TECHNIQUE: After obtaining the patient's consent, 146 CT images were obtained without non-ionic intravenous contrast material.  DISCUSSION:   A routine nonenhanced head CT was performed.  No acute brain abnormality is identified.  No acute intracranial hemorrhage.  No acute infarct is seen.  No acute skull fracture.  No midline shift or acute intracranial mass effect is seen.  The ventricular size and configuration are within normal limits.  There are bilateral basal ganglia  calcifications.  There is chronic leftward nasal septal deviation with an associated nasal spur.  Mild to moderate age-indeterminate mucosal thickening and/or retained secretions involve(s) the imaged paranasal sinuses.  No air-fluid interfaces are seen within the imaged paranasal sinuses.  The imaged middle ear clefts (that is, the bilateral mastoid air cell complexes, bilateral middle ear cavities, and bilateral external auditory canals) are well aerated.        No acute brain abnormality is seen. Specifically, no acute intracranial hemorrhage, no acute infarct, no intracranial mass lesion, and no acute intracranial mass effect are appreciated.   Please note that portions of this note were completed with a voice recognition program.  RADHA BROWN JR, MD       Electronically Signed and Approved By: RADHA BROWN JR, MD on 1/02/2023 at 5:08                  Differential Diagnosis and Discussion:    Trauma:  Differential diagnosis considered but not limited to were subarachnoid hemorrhage, intracranial bleeding, pneumothorax, cardiac contusion, lung contusion, intra-abdominal bleeding, and compartment syndrome of any extremity or other significant traumatic pathology    CT scan radiology interpretation was reviewed by me.    MDM  Number of Diagnoses or Management Options  Contusion of head, unspecified part of head, initial encounter  Diagnosis management comments: The patient presents with an acute closed head injury. [place] Criteria for CT scan was followed. CT of the head is required for patients with minor head injury and any one of the following (including a GCS of 15):     1.                     Headache   2.                     Vomiting   3.                     Older than 60 years   4.                     Drug or Alcohol intoxication   5.                     Persistent anterograde amnesia   6.                     Visible trauma above the clavicle   7.                     Seizure.      The CT scan of  the head shows no acute intracranial abnormalities. This includes subarachnoid hemorrhage, subdural hematoma, epidural hematoma, or calvarial fractures. The patient is neurologically intact, has a normal mental status and is ambulatory in the ED. The patient has had no seizure like activity in the ED. The vital signs have been stable. The patient's condition is stable and appropriate for discharge. The patient made aware of the symptoms of post-concussive syndrome. The patient was counseled to return to the ED for motor or sensory deficit, altered mental status, uncontrollable headache, visual changes, seizures, or for any re-examination of new symptoms. The patient will pursue further outpatient evaluation with the primary care physician or other designated or consulting position as indicated in the discharge instructions as a follow up.         Amount and/or Complexity of Data Reviewed  Tests in the radiology section of CPT®: reviewed and ordered  Decide to obtain previous medical records or to obtain history from someone other than the patient: yes  Review and summarize past medical records: yes (ED visit from earlier tonight reviewed)    Risk of Complications, Morbidity, and/or Mortality  Presenting problems: low  Diagnostic procedures: low  Management options: minimal    Patient Progress  Patient progress: stable     Patient Care Considerations:    None      Consultants/Shared Management Plan:    None    Social Determinants of Health:    Patient is independent, reliable, and has access to care.       Disposition and Care Coordination:    Discharged: The patient is suitable and stable for discharge with no need for consideration of observation or admission.    I have explained the patient´s condition, diagnoses and treatment plan based on the information available to me at this time. I have answered questions and addressed any concerns. The patient has a good  understanding of the patient´s diagnosis, condition,  and treatment plan as can be expected at this point. The vital signs have been stable. The patient´s condition is stable and appropriate for discharge from the emergency department.      The patient will pursue further outpatient evaluation with the primary care physician or other designated or consulting physician as outlined in the discharge instructions. They are agreeable to this plan of care and follow-up instructions have been explained in detail. The patient has received these instructions in written format and have expressed an understanding of the discharge instructions. The patient is aware that any significant change in condition or worsening of symptoms should prompt an immediate return to this or the closest emergency department or call to 911.    Final diagnoses:   Contusion of head, unspecified part of head, initial encounter        ED Disposition     ED Disposition   DC/Transfer to Behavioral Health Condition   Stable    Comment   --             This medical record created using voice recognition software.           Jazmyne Pa, INDRA  01/02/23 0536

## 2023-01-02 NOTE — DISCHARGE INSTRUCTIONS
CT scan of head was negative.      Medical screening exam complete and patient cleared for return to [facility]     okay return to [facility] for continued admission

## 2023-01-09 ENCOUNTER — HOSPITAL ENCOUNTER (EMERGENCY)
Facility: HOSPITAL | Age: 23
Discharge: PSYCHIATRIC OR SUBSTANCE USE FACILITY | End: 2023-01-09
Attending: EMERGENCY MEDICINE | Admitting: EMERGENCY MEDICINE
Payer: MEDICAID

## 2023-01-09 VITALS
BODY MASS INDEX: 34.52 KG/M2 | DIASTOLIC BLOOD PRESSURE: 68 MMHG | SYSTOLIC BLOOD PRESSURE: 119 MMHG | WEIGHT: 254.85 LBS | TEMPERATURE: 98.1 F | OXYGEN SATURATION: 100 % | HEART RATE: 69 BPM | RESPIRATION RATE: 16 BRPM | HEIGHT: 72 IN

## 2023-01-09 DIAGNOSIS — F32.A DEPRESSION WITH SUICIDAL IDEATION: Primary | ICD-10-CM

## 2023-01-09 DIAGNOSIS — R45.851 DEPRESSION WITH SUICIDAL IDEATION: Primary | ICD-10-CM

## 2023-01-09 LAB
ALBUMIN SERPL-MCNC: 4.2 G/DL (ref 3.5–5.2)
ALBUMIN/GLOB SERPL: 1.7 G/DL
ALP SERPL-CCNC: 118 U/L (ref 39–117)
ALT SERPL W P-5'-P-CCNC: 48 U/L (ref 1–41)
AMPHET+METHAMPHET UR QL: NEGATIVE
ANION GAP SERPL CALCULATED.3IONS-SCNC: 13.6 MMOL/L (ref 5–15)
APAP SERPL-MCNC: <5 MCG/ML (ref 0–30)
AST SERPL-CCNC: 20 U/L (ref 1–40)
BARBITURATES UR QL SCN: NEGATIVE
BASOPHILS # BLD AUTO: 0.02 10*3/MM3 (ref 0–0.2)
BASOPHILS NFR BLD AUTO: 0.2 % (ref 0–1.5)
BENZODIAZ UR QL SCN: NEGATIVE
BILIRUB SERPL-MCNC: 0.3 MG/DL (ref 0–1.2)
BUN SERPL-MCNC: 12 MG/DL (ref 6–20)
BUN/CREAT SERPL: 13 (ref 7–25)
CALCIUM SPEC-SCNC: 9.2 MG/DL (ref 8.6–10.5)
CANNABINOIDS SERPL QL: NEGATIVE
CHLORIDE SERPL-SCNC: 104 MMOL/L (ref 98–107)
CO2 SERPL-SCNC: 23.4 MMOL/L (ref 22–29)
COCAINE UR QL: NEGATIVE
CREAT SERPL-MCNC: 0.92 MG/DL (ref 0.76–1.27)
DEPRECATED RDW RBC AUTO: 35.4 FL (ref 37–54)
EGFRCR SERPLBLD CKD-EPI 2021: 120.6 ML/MIN/1.73
EOSINOPHIL # BLD AUTO: 0.16 10*3/MM3 (ref 0–0.4)
EOSINOPHIL NFR BLD AUTO: 1.6 % (ref 0.3–6.2)
ERYTHROCYTE [DISTWIDTH] IN BLOOD BY AUTOMATED COUNT: 12.5 % (ref 12.3–15.4)
ETHANOL BLD-MCNC: <10 MG/DL (ref 0–10)
ETHANOL UR QL: <0.01 %
GLOBULIN UR ELPH-MCNC: 2.5 GM/DL
GLUCOSE SERPL-MCNC: 101 MG/DL (ref 65–99)
HCT VFR BLD AUTO: 44 % (ref 37.5–51)
HGB BLD-MCNC: 15.3 G/DL (ref 13–17.7)
HOLD SPECIMEN: NORMAL
HOLD SPECIMEN: NORMAL
IMM GRANULOCYTES # BLD AUTO: 0.03 10*3/MM3 (ref 0–0.05)
IMM GRANULOCYTES NFR BLD AUTO: 0.3 % (ref 0–0.5)
LITHIUM SERPL-SCNC: 0.1 MMOL/L (ref 0.6–1.2)
LYMPHOCYTES # BLD AUTO: 3.51 10*3/MM3 (ref 0.7–3.1)
LYMPHOCYTES NFR BLD AUTO: 35.2 % (ref 19.6–45.3)
MCH RBC QN AUTO: 27.5 PG (ref 26.6–33)
MCHC RBC AUTO-ENTMCNC: 34.8 G/DL (ref 31.5–35.7)
MCV RBC AUTO: 79 FL (ref 79–97)
METHADONE UR QL SCN: NEGATIVE
MONOCYTES # BLD AUTO: 0.66 10*3/MM3 (ref 0.1–0.9)
MONOCYTES NFR BLD AUTO: 6.6 % (ref 5–12)
NEUTROPHILS NFR BLD AUTO: 5.58 10*3/MM3 (ref 1.7–7)
NEUTROPHILS NFR BLD AUTO: 56.1 % (ref 42.7–76)
NRBC BLD AUTO-RTO: 0 /100 WBC (ref 0–0.2)
OPIATES UR QL: NEGATIVE
OXYCODONE UR QL SCN: NEGATIVE
PLATELET # BLD AUTO: 394 10*3/MM3 (ref 140–450)
PMV BLD AUTO: 9.1 FL (ref 6–12)
POTASSIUM SERPL-SCNC: 3.9 MMOL/L (ref 3.5–5.2)
PROT SERPL-MCNC: 6.7 G/DL (ref 6–8.5)
RBC # BLD AUTO: 5.57 10*6/MM3 (ref 4.14–5.8)
SALICYLATES SERPL-MCNC: <0.3 MG/DL
SARS-COV-2 RNA RESP QL NAA+PROBE: NOT DETECTED
SODIUM SERPL-SCNC: 141 MMOL/L (ref 136–145)
WBC NRBC COR # BLD: 9.96 10*3/MM3 (ref 3.4–10.8)
WHOLE BLOOD HOLD COAG: NORMAL
WHOLE BLOOD HOLD SPECIMEN: NORMAL

## 2023-01-09 PROCEDURE — 80307 DRUG TEST PRSMV CHEM ANLYZR: CPT | Performed by: EMERGENCY MEDICINE

## 2023-01-09 PROCEDURE — 80178 ASSAY OF LITHIUM: CPT | Performed by: NURSE PRACTITIONER

## 2023-01-09 PROCEDURE — 82077 ASSAY SPEC XCP UR&BREATH IA: CPT | Performed by: EMERGENCY MEDICINE

## 2023-01-09 PROCEDURE — 99285 EMERGENCY DEPT VISIT HI MDM: CPT

## 2023-01-09 PROCEDURE — 80179 DRUG ASSAY SALICYLATE: CPT | Performed by: EMERGENCY MEDICINE

## 2023-01-09 PROCEDURE — 80143 DRUG ASSAY ACETAMINOPHEN: CPT | Performed by: EMERGENCY MEDICINE

## 2023-01-09 PROCEDURE — U0003 INFECTIOUS AGENT DETECTION BY NUCLEIC ACID (DNA OR RNA); SEVERE ACUTE RESPIRATORY SYNDROME CORONAVIRUS 2 (SARS-COV-2) (CORONAVIRUS DISEASE [COVID-19]), AMPLIFIED PROBE TECHNIQUE, MAKING USE OF HIGH THROUGHPUT TECHNOLOGIES AS DESCRIBED BY CMS-2020-01-R: HCPCS | Performed by: EMERGENCY MEDICINE

## 2023-01-09 PROCEDURE — C9803 HOPD COVID-19 SPEC COLLECT: HCPCS

## 2023-01-09 PROCEDURE — 80053 COMPREHEN METABOLIC PANEL: CPT | Performed by: EMERGENCY MEDICINE

## 2023-01-09 PROCEDURE — 85025 COMPLETE CBC W/AUTO DIFF WBC: CPT | Performed by: EMERGENCY MEDICINE

## 2023-01-09 RX ORDER — SODIUM CHLORIDE 0.9 % (FLUSH) 0.9 %
10 SYRINGE (ML) INJECTION AS NEEDED
Status: DISCONTINUED | OUTPATIENT
Start: 2023-01-09 | End: 2023-01-09 | Stop reason: HOSPADM

## 2023-01-09 NOTE — ED PROVIDER NOTES
Time: 2:58 AM EST  Date of encounter:  1/9/2023  Independent Historian/Clinical History and Information was obtained by:   Patient  Chief Complaint: Suicidal ideation    History is limited by: N/A    History of Present Illness:  Patient is a 22 y.o. year old male who presents to the emergency department for evaluation of depression and suicidal ideation      History provided by:  Patient  Suicidal  Presenting symptoms: depression and suicidal thoughts ( Plan to hang himself)    Presenting symptoms: no hallucinations, no self-mutilation, no suicidal threats and no suicide attempt    Degree of incapacity (severity):  Moderate  Onset quality:  Gradual  Duration:  1 day  Timing:  Constant  Progression:  Unchanged  Chronicity:  Recurrent  Context: noncompliance ( Patient had been given medication from [facility] when he was discharged there recently.  He has been at a sober living house for a week and has been unable to take because they will not let him take sedating medication) and stressful life event ( Patient was in a sober living house after psychiatric admission for methamphetamine abuse.  He has been there week and states he had to leave last night because he was getting bullied and he did not like it there)    Treatment compliance:  Untreated  Time since last psychoactive medication taken:  1 week  Relieved by:  Mood stabilizers  Exacerbated by: Interactions with other residents of [facility].  Associated symptoms: anxiety    Associated symptoms: no abdominal pain, no anhedonia, no appetite change, no chest pain, no decreased need for sleep, not distractible, no euphoric mood, no fatigue, no feelings of worthlessness, no headaches, no hypersomnia, no hyperventilation, no insomnia, no irritability and no poor judgment    Risk factors: hx of mental illness, hx of suicide attempts and recent psychiatric admission        Patient Care Team  Primary Care Provider: Provider, No Known    Past Medical  History:     Allergies   Allergen Reactions   • Fish-Derived Products Anaphylaxis   • Onion Anaphylaxis   • Tomato Itching     Past Medical History:   Diagnosis Date   • Anxiety    • Bipolar 1 disorder (HCC)    • Depression    • PTSD (post-traumatic stress disorder)      Past Surgical History:   Procedure Laterality Date   • KNEE ACL RECONSTRUCTION Right      History reviewed. No pertinent family history.    Home Medications:  Prior to Admission medications    Medication Sig Start Date End Date Taking? Authorizing Provider   lithium (ESKALITH) 450 MG CR tablet Take 1 tablet by mouth 2 (Two) Times a Day. Indications: Mood stabilization 7/1/22   Roger Valdovinos MD   pantoprazole (PROTONIX) 40 MG EC tablet Take 1 tablet by mouth Daily. Indications: Gastroesophageal Reflux Disease 7/1/22   Roger Valdovinos MD   prazosin (MINIPRESS) 1 MG capsule Take 1 capsule by mouth Every Night. Indications: Frightening Dreams 7/1/22   Roger Valdovinos MD   traZODone (DESYREL) 100 MG tablet Take 1 tablet by mouth At Night As Needed for Sleep. Indications: Trouble Sleeping 7/1/22   Roger Valdovinos MD        Social History:   Social History     Tobacco Use   • Smoking status: Every Day     Packs/day: 0.50     Types: Cigarettes   • Smokeless tobacco: Never   Vaping Use   • Vaping Use: Every day   • Substances: Nicotine, Flavoring   Substance Use Topics   • Alcohol use: Not Currently     Comment: social   • Drug use: Yes     Types: Methamphetamines     Comment: last use 3 weeks ago         Review of Systems:  Review of Systems   Constitutional: Negative for appetite change, chills, fatigue, fever and irritability.   HENT: Negative for congestion, ear pain and sore throat.    Eyes: Negative for pain.   Respiratory: Negative for cough, chest tightness and shortness of breath.    Cardiovascular: Negative for chest pain.   Gastrointestinal: Negative for abdominal pain, diarrhea, nausea and vomiting.   Genitourinary: Negative for flank pain and hematuria.  "  Musculoskeletal: Negative for joint swelling.   Skin: Negative for pallor.   Neurological: Negative for seizures and headaches.   Psychiatric/Behavioral: Positive for dysphoric mood and suicidal ideas ( Plan to hang himself). Negative for hallucinations and self-injury. The patient is nervous/anxious. The patient does not have insomnia.    All other systems reviewed and are negative.       Physical Exam:  /83 (BP Location: Left arm, Patient Position: Lying)   Pulse 99   Temp 98 °F (36.7 °C) (Oral)   Resp 18   Ht 182.9 cm (72\")   Wt 116 kg (254 lb 13.6 oz)   SpO2 94%   BMI 34.56 kg/m²     Physical Exam  Vitals and nursing note reviewed.   Constitutional:       General: He is not in acute distress.     Appearance: Normal appearance. He is not toxic-appearing.   HENT:      Head: Normocephalic and atraumatic.      Right Ear: Tympanic membrane, ear canal and external ear normal.      Left Ear: Tympanic membrane, ear canal and external ear normal.      Nose: Nose normal.      Mouth/Throat:      Mouth: Mucous membranes are moist.   Eyes:      General: No scleral icterus.     Extraocular Movements: Extraocular movements intact.      Conjunctiva/sclera: Conjunctivae normal.      Pupils: Pupils are equal, round, and reactive to light.   Cardiovascular:      Rate and Rhythm: Normal rate and regular rhythm.      Pulses: Normal pulses.      Heart sounds: Normal heart sounds.   Pulmonary:      Effort: Pulmonary effort is normal. No respiratory distress.      Breath sounds: Normal breath sounds.   Abdominal:      General: Abdomen is flat.      Palpations: Abdomen is soft.      Tenderness: There is no abdominal tenderness.   Musculoskeletal:         General: Normal range of motion.      Cervical back: Normal range of motion and neck supple.   Skin:     General: Skin is warm and dry.   Neurological:      Mental Status: He is alert and oriented to person, place, and time. Mental status is at baseline.      Cranial " Nerves: No cranial nerve deficit.      Sensory: No sensory deficit.      Motor: No weakness.   Psychiatric:         Behavior: Behavior normal.         Judgment: Judgment normal.      Comments: Patient has a flat affect states he wants to hurt himself and his plan is to hang himself.    Patient denies hallucinations          Procedures:  Procedures      Medical Decision Making:      Comorbidities that affect care:    Bipolar, depression, anxiety, PTSD    External Notes reviewed:    Previous Admission Note and Previous ED Note      The following orders were placed and all results were independently analyzed by me:  Orders Placed This Encounter   Procedures   • COVID-19,CEPHEID/GEOFFREY,COR/FLORIDALMA/PAD/SEFERINO/MAD IN-HOUSE(OR EMERGENT/ADD-ON),NP SWAB IN TRANSPORT MEDIA 3-4 HR TAT, RT-PCR - Swab, Nasopharynx   • [place] Draw   • Comprehensive Metabolic Panel   • Acetaminophen Level   • Ethanol   • Salicylate Level   • Urine Drug Screen - Urine, Clean Catch   • CBC Auto Differential   • Lithium Level   • NPO Diet NPO Type: Strict NPO   • Cardiac Monitoring   • Continuous Pulse Oximetry   • Vital Signs   • Undress & Gown   • Send referrals to all psych facilities  [facility] Nursing Order (Specify)   • Psych / Access to See   • Inpatient [facility] Consult   • POC Glucose Once   • Insert Peripheral IV   • Suicide Precautions   • CBC & Differential   • Green Top (Gel)   • Lavender Top   • Gold Top - SST   • Light Blue Top       Medications Given in the Emergency Department:  Medications   sodium chloride 0.9 % flush 10 mL (has no administration in time range)        ED Course:    ED Course as of 01/09/23 0849   Mon Jan 09, 2023   0550 Patient has been seen and evaluated by the psychiatric evaluator Chelsea from [facility].  Patient still is currently suicidal and has active plan of trying to hang himself so she feels he needs to be treated inpatient.  We currently do not have any beds available here at the hospital so we will send  referrals out [DS]   0843 Patient was accepted at the [facility]. [MD]      ED Course User Index  [DS] Jazmyne Pa APRN  [MD] Paul Willard PA-C       Labs:    Lab Results (last 24 hours)     Procedure Component Value Units Date/Time    CBC & Differential [[id number]]  (Abnormal) Collected: 01/09/23 0319    Specimen: Blood Updated: 01/09/23 0339    Narrative:      The following orders were created for panel order CBC & Differential.  Procedure                               Abnormality         Status                     ---------                               -----------         ------                     CBC Auto Differential[[id number]]        Abnormal            Final result                 Please view results for these tests on the individual orders.    Comprehensive Metabolic Panel [[id number]]  (Abnormal) Collected: 01/09/23 0319    Specimen: Blood Updated: 01/09/23 0356     Glucose 101 mg/dL      BUN 12 mg/dL      Creatinine 0.92 mg/dL      Sodium 141 mmol/L      Potassium 3.9 mmol/L      Chloride 104 mmol/L      CO2 23.4 mmol/L      Calcium 9.2 mg/dL      Total Protein 6.7 g/dL      Albumin 4.2 g/dL      ALT (SGPT) 48 U/L      AST (SGOT) 20 U/L      Alkaline Phosphatase 118 U/L      Total Bilirubin 0.3 mg/dL      Globulin 2.5 gm/dL      A/G Ratio 1.7 g/dL      BUN/Creatinine Ratio 13.0     Anion Gap 13.6 mmol/L      eGFR 120.6 mL/min/1.73      Comment: National Kidney Foundation and American Society of Nephrology (ASN) Task Force recommended calculation based on the Chronic Kidney Disease Epidemiology Collaboration (CKD-EPI) equation refit without adjustment for race.       Narrative:      GFR Normal >60  Chronic Kidney Disease <60  Kidney Failure <15      Acetaminophen Level [[id number]]  (Normal) Collected: 01/09/23 0319    Specimen: Blood Updated: 01/09/23 0356     Acetaminophen <5.0 mcg/mL     Ethanol [[id number]] Collected: 01/09/23 0319    Specimen: Blood Updated: 01/09/23 0356     Ethanol <10 mg/dL       Ethanol % <0.010 %     Narrative:      Ethanol (Plasma)  <10 Essentially Negative    Toxic Concentrations           mg/dL    Flushing, slowing of reflexes    Impaired visual activity         Depression of CNS              >100  Possible Coma                  >300       Salicylate Level [[id number]]  (Normal) Collected: 01/09/23 0319    Specimen: Blood Updated: 01/09/23 0356     Salicylate <0.3 mg/dL     CBC Auto Differential [[id number]]  (Abnormal) Collected: 01/09/23 0319    Specimen: Blood Updated: 01/09/23 0339     WBC 9.96 10*3/mm3      RBC 5.57 10*6/mm3      Hemoglobin 15.3 g/dL      Hematocrit 44.0 %      MCV 79.0 fL      MCH 27.5 pg      MCHC 34.8 g/dL      RDW 12.5 %      RDW-SD 35.4 fl      MPV 9.1 fL      Platelets 394 10*3/mm3      Neutrophil % 56.1 %      Lymphocyte % 35.2 %      Monocyte % 6.6 %      Eosinophil % 1.6 %      Basophil % 0.2 %      Immature Grans % 0.3 %      Neutrophils, Absolute 5.58 10*3/mm3      Lymphocytes, Absolute 3.51 10*3/mm3      Monocytes, Absolute 0.66 10*3/mm3      Eosinophils, Absolute 0.16 10*3/mm3      Basophils, Absolute 0.02 10*3/mm3      Immature Grans, Absolute 0.03 10*3/mm3      nRBC 0.0 /100 WBC     Lithium Level [[id number]]  (Abnormal) Collected: 01/09/23 0319    Specimen: Blood Updated: 01/09/23 0501     Lithium 0.1 mmol/L     COVID-19,CEPHEID/GEOFFREY,COR/FLORIDALMA/PAD/SEFERINO/MAD IN-HOUSE(OR EMERGENT/ADD-ON),NP SWAB IN TRANSPORT MEDIA 3-4 HR TAT, RT-PCR - Swab, Nasopharynx [[id number]]  (Normal) Collected: 01/09/23 0336    Specimen: Swab from Nasopharynx Updated: 01/09/23 0421     COVID19 Not Detected    Narrative:      Fact sheet for providers: https://www.fda.gov/media/917789/download     Fact sheet for patients: https://www.fda.gov/media/203366/download  Fact sheet for providers: https://www.fda.gov/media/195388/download     Fact sheet for patients: https://www.fda.gov/media/874662/download    Urine Drug Screen - Urine, Clean Catch [[id number]]  (Normal) Collected:  01/09/23 0417    Specimen: Urine, Clean Catch Updated: 01/09/23 0441     Amphet/Methamphet, Screen Negative     Barbiturates Screen, Urine Negative     Benzodiazepine Screen, Urine Negative     Cocaine Screen, Urine Negative     Opiate Screen Negative     THC, Screen, Urine Negative     Methadone Screen, Urine Negative     Oxycodone Screen, Urine Negative    Narrative:      Negative Thresholds Per Drugs Screened:    Amphetamines                 500 ng/ml  Barbiturates                 200 ng/ml  Benzodiazepines              100 ng/ml  Cocaine                      300 ng/ml  Methadone                    300 ng/ml  Opiates                      300 ng/ml  Oxycodone                    100 ng/ml  THC                           50 ng/ml    The Normal Value for all drugs tested is negative. This report includes final unconfirmed screening results to be used for medical treatment purposes only. Unconfirmed results must not be used for non-medical purposes such as employment or legal testing. Clinical consideration should be applied to any drug of abuse test, particularly when unconfirmed results are used.                   Imaging:    No Radiology Exams Resulted Within Past 24 Hours      Differential Diagnosis and Discussion:    Patient is actively suicidal.  Needs evaluation whether patient has substance abuse on board    All labs were reviewed and analyzed by me.    MDM  Number of Diagnoses or Management Options  Depression with suicidal ideation  Diagnosis management comments: Patient was evaluated by the psychiatric evaluator and deemed to need inpatient treatment.  Patient is voluntary for this.  Dr. Muñoz with the [facility] accepted transfer patient for further care.       Amount and/or Complexity of Data Reviewed  Clinical lab tests: reviewed and ordered  Decide to obtain previous medical records or to obtain history from someone other than the patient: yes  Review and summarize past medical records: yes (Numerous ED  visits for similar)    Risk of Complications, Morbidity, and/or Mortality  Presenting problems: moderate  Diagnostic procedures: low  Management options: low    Patient Progress  Patient progress: stable         Patient Care Considerations:      Consultants/Shared Management Plan:    Consultant: I have discussed the case with Chelsea Crockett who states the patient needs admission for further care of SI  Transfer Provider: I have discussed the case with Dr. Muñoz at [facility] who agrees to accept the patient as a transfer.    Disposition and Care Coordination:    Transferred: Through independent evaluation of the patient's history, physical, and imperical data, the patient meets criteria to be transferred to another hospital for evaluation/admission.      Final diagnoses:   Depression with suicidal ideation        ED Disposition     ED Disposition   Transfer to Another Facility     Condition   --    Comment   --             This medical record created using voice recognition software.           Paul Willard PA-C  01/09/23 0849

## 2023-04-10 ENCOUNTER — APPOINTMENT (OUTPATIENT)
Dept: GENERAL RADIOLOGY | Facility: HOSPITAL | Age: 23
End: 2023-04-10
Payer: MEDICAID

## 2023-04-10 ENCOUNTER — HOSPITAL ENCOUNTER (EMERGENCY)
Facility: HOSPITAL | Age: 23
Discharge: HOME | End: 2023-04-10
Attending: EMERGENCY MEDICINE | Admitting: EMERGENCY MEDICINE
Payer: MEDICAID

## 2023-04-10 VITALS
OXYGEN SATURATION: 96 % | RESPIRATION RATE: 16 BRPM | HEIGHT: 72 IN | WEIGHT: 257.94 LBS | TEMPERATURE: 98.6 F | HEART RATE: 71 BPM | DIASTOLIC BLOOD PRESSURE: 98 MMHG | BODY MASS INDEX: 34.94 KG/M2 | SYSTOLIC BLOOD PRESSURE: 148 MMHG

## 2023-04-10 DIAGNOSIS — F11.93 OPIOID WITHDRAWAL: ICD-10-CM

## 2023-04-10 DIAGNOSIS — R07.9 ACUTE NONSPECIFIC CHEST PAIN WITH LOW RISK OF CORONARY ARTERY DISEASE: Primary | ICD-10-CM

## 2023-04-10 DIAGNOSIS — Z91.89 ACUTE NONSPECIFIC CHEST PAIN WITH LOW RISK OF CORONARY ARTERY DISEASE: Primary | ICD-10-CM

## 2023-04-10 LAB
ALBUMIN SERPL-MCNC: 4 G/DL (ref 3.5–5.2)
ALBUMIN/GLOB SERPL: 1.6 G/DL
ALP SERPL-CCNC: 104 U/L (ref 39–117)
ALT SERPL W P-5'-P-CCNC: 55 U/L (ref 1–41)
ANION GAP SERPL CALCULATED.3IONS-SCNC: 9.3 MMOL/L (ref 5–15)
AST SERPL-CCNC: 27 U/L (ref 1–40)
BASOPHILS # BLD AUTO: 0.03 10*3/MM3 (ref 0–0.2)
BASOPHILS NFR BLD AUTO: 0.3 % (ref 0–1.5)
BILIRUB SERPL-MCNC: <0.2 MG/DL (ref 0–1.2)
BUN SERPL-MCNC: 11 MG/DL (ref 6–20)
BUN/CREAT SERPL: 14.1 (ref 7–25)
CALCIUM SPEC-SCNC: 8.8 MG/DL (ref 8.6–10.5)
CHLORIDE SERPL-SCNC: 105 MMOL/L (ref 98–107)
CO2 SERPL-SCNC: 22.7 MMOL/L (ref 22–29)
CREAT SERPL-MCNC: 0.78 MG/DL (ref 0.76–1.27)
D DIMER PPP FEU-MCNC: <0.27 MCGFEU/ML (ref 0–0.5)
DEPRECATED RDW RBC AUTO: 36.8 FL (ref 37–54)
EGFRCR SERPLBLD CKD-EPI 2021: 129.3 ML/MIN/1.73
EOSINOPHIL # BLD AUTO: 0.21 10*3/MM3 (ref 0–0.4)
EOSINOPHIL NFR BLD AUTO: 2.2 % (ref 0.3–6.2)
ERYTHROCYTE [DISTWIDTH] IN BLOOD BY AUTOMATED COUNT: 12.5 % (ref 12.3–15.4)
GLOBULIN UR ELPH-MCNC: 2.5 GM/DL
GLUCOSE SERPL-MCNC: 99 MG/DL (ref 65–99)
HCT VFR BLD AUTO: 44.9 % (ref 37.5–51)
HGB BLD-MCNC: 15.3 G/DL (ref 13–17.7)
HOLD SPECIMEN: NORMAL
HOLD SPECIMEN: NORMAL
IMM GRANULOCYTES # BLD AUTO: 0.05 10*3/MM3 (ref 0–0.05)
IMM GRANULOCYTES NFR BLD AUTO: 0.5 % (ref 0–0.5)
LYMPHOCYTES # BLD AUTO: 2.67 10*3/MM3 (ref 0.7–3.1)
LYMPHOCYTES NFR BLD AUTO: 27.7 % (ref 19.6–45.3)
MCH RBC QN AUTO: 27.6 PG (ref 26.6–33)
MCHC RBC AUTO-ENTMCNC: 34.1 G/DL (ref 31.5–35.7)
MCV RBC AUTO: 81 FL (ref 79–97)
MONOCYTES # BLD AUTO: 0.63 10*3/MM3 (ref 0.1–0.9)
MONOCYTES NFR BLD AUTO: 6.5 % (ref 5–12)
NEUTROPHILS NFR BLD AUTO: 6.05 10*3/MM3 (ref 1.7–7)
NEUTROPHILS NFR BLD AUTO: 62.8 % (ref 42.7–76)
NRBC BLD AUTO-RTO: 0 /100 WBC (ref 0–0.2)
NT-PROBNP SERPL-MCNC: 93.3 PG/ML (ref 0–450)
PLATELET # BLD AUTO: 285 10*3/MM3 (ref 140–450)
PMV BLD AUTO: 9.2 FL (ref 6–12)
POTASSIUM SERPL-SCNC: 4.1 MMOL/L (ref 3.5–5.2)
PROT SERPL-MCNC: 6.5 G/DL (ref 6–8.5)
QT INTERVAL: 334 MS
RBC # BLD AUTO: 5.54 10*6/MM3 (ref 4.14–5.8)
SODIUM SERPL-SCNC: 137 MMOL/L (ref 136–145)
TROPONIN T SERPL HS-MCNC: <6 NG/L
WBC NRBC COR # BLD: 9.64 10*3/MM3 (ref 3.4–10.8)
WHOLE BLOOD HOLD COAG: NORMAL
WHOLE BLOOD HOLD SPECIMEN: NORMAL

## 2023-04-10 PROCEDURE — 85025 COMPLETE CBC W/AUTO DIFF WBC: CPT

## 2023-04-10 PROCEDURE — 93010 ELECTROCARDIOGRAM REPORT: CPT | Performed by: INTERNAL MEDICINE

## 2023-04-10 PROCEDURE — 93005 ELECTROCARDIOGRAM TRACING: CPT | Performed by: EMERGENCY MEDICINE

## 2023-04-10 PROCEDURE — 93005 ELECTROCARDIOGRAM TRACING: CPT

## 2023-04-10 PROCEDURE — 85379 FIBRIN DEGRADATION QUANT: CPT | Performed by: EMERGENCY MEDICINE

## 2023-04-10 PROCEDURE — 96374 THER/PROPH/DIAG INJ IV PUSH: CPT

## 2023-04-10 PROCEDURE — 25010000002 ONDANSETRON PER 1 MG: Performed by: EMERGENCY MEDICINE

## 2023-04-10 PROCEDURE — 83880 ASSAY OF NATRIURETIC PEPTIDE: CPT

## 2023-04-10 PROCEDURE — 84484 ASSAY OF TROPONIN QUANT: CPT

## 2023-04-10 PROCEDURE — 96375 TX/PRO/DX INJ NEW DRUG ADDON: CPT

## 2023-04-10 PROCEDURE — 71045 X-RAY EXAM CHEST 1 VIEW: CPT

## 2023-04-10 PROCEDURE — 80053 COMPREHEN METABOLIC PANEL: CPT

## 2023-04-10 PROCEDURE — 99284 EMERGENCY DEPT VISIT MOD MDM: CPT

## 2023-04-10 RX ORDER — ONDANSETRON 4 MG/1
4 TABLET, ORALLY DISINTEGRATING ORAL EVERY 6 HOURS PRN
Qty: 15 TABLET | Refills: 0 | Status: SHIPPED | OUTPATIENT
Start: 2023-04-10

## 2023-04-10 RX ORDER — SODIUM CHLORIDE 0.9 % (FLUSH) 0.9 %
10 SYRINGE (ML) INJECTION AS NEEDED
Status: DISCONTINUED | OUTPATIENT
Start: 2023-04-10 | End: 2023-04-10 | Stop reason: HOSPADM

## 2023-04-10 RX ORDER — FLUOXETINE 10 MG/1
10 CAPSULE ORAL DAILY
COMMUNITY

## 2023-04-10 RX ORDER — FAMOTIDINE 10 MG/ML
20 INJECTION, SOLUTION INTRAVENOUS ONCE
Status: COMPLETED | OUTPATIENT
Start: 2023-04-10 | End: 2023-04-10

## 2023-04-10 RX ORDER — ONDANSETRON 2 MG/ML
4 INJECTION INTRAMUSCULAR; INTRAVENOUS ONCE
Status: COMPLETED | OUTPATIENT
Start: 2023-04-10 | End: 2023-04-10

## 2023-04-10 RX ADMIN — FAMOTIDINE 20 MG: 10 INJECTION INTRAVENOUS at 09:51

## 2023-04-10 RX ADMIN — ONDANSETRON 4 MG: 2 INJECTION INTRAMUSCULAR; INTRAVENOUS at 09:51

## 2023-04-10 NOTE — ED PROVIDER NOTES
Time: 9:00 AM EDT  Date of encounter:  4/10/2023  Independent Historian/Clinical History and Information was obtained by:   Patient  Chief Complaint: vomiting    History is limited by: N/A    History of Present Illness:  Patient is a 22 y.o. year old male who presents to the emergency department for evaluation of n/v x2 days. He vomited this morning. Pt also c/o CP since yesterday. Pt reports pain with deep breathing. Pt is currently detoxing from heroin (injecting). Pt is supposed to take Lithium and states he has not taken it for 1 month.       History provided by:  Patient   used: No        Patient Care Team  Primary Care Provider: Provider, Aislinn Known    Past Medical History:     Allergies   Allergen Reactions   • Fish-Derived Products Anaphylaxis   • Onion Anaphylaxis   • Tomato Itching     Past Medical History:   Diagnosis Date   • Anxiety    • Bipolar 1 disorder    • Depression    • PTSD (post-traumatic stress disorder)      Past Surgical History:   Procedure Laterality Date   • KNEE ACL RECONSTRUCTION Right      History reviewed. No pertinent family history.    Home Medications:  Prior to Admission medications    Medication Sig Start Date End Date Taking? Authorizing Provider   FLUoxetine (PROzac) 10 MG capsule Take 1 capsule by mouth Daily.    Provider, MD Russ   pantoprazole (PROTONIX) 40 MG EC tablet Take 1 tablet by mouth Daily. Indications: Gastroesophageal Reflux Disease 7/1/22   Roger Valdovinos MD   lithium (ESKALITH) 450 MG CR tablet Take 1 tablet by mouth 2 (Two) Times a Day. Indications: Mood stabilization 7/1/22 4/10/23  Roger Valdovinos MD   prazosin (MINIPRESS) 1 MG capsule Take 1 capsule by mouth Every Night. Indications: Frightening Dreams 7/1/22 4/10/23  Roger Valdovinos MD   traZODone (DESYREL) 100 MG tablet Take 1 tablet by mouth At Night As Needed for Sleep. Indications: Trouble Sleeping 7/1/22 4/10/23  Roger Valdovinos MD        Social History:   Social History     Tobacco Use  "  • Smoking status: Every Day     Packs/day: 0.50     Types: Cigarettes   • Smokeless tobacco: Never   Vaping Use   • Vaping Use: Every day   • Substances: Nicotine, Flavoring   Substance Use Topics   • Alcohol use: Not Currently     Comment: social   • Drug use: Yes     Types: Methamphetamines     Comment: last use 3 weeks ago         Review of Systems:  Review of Systems   Constitutional: Negative for chills and fever.   HENT: Negative for congestion, rhinorrhea and sore throat.    Eyes: Negative for photophobia.   Respiratory: Negative for apnea, cough, chest tightness and shortness of breath.    Cardiovascular: Positive for chest pain. Negative for palpitations.   Gastrointestinal: Positive for nausea and vomiting. Negative for abdominal pain and diarrhea.   Endocrine: Negative.    Genitourinary: Negative for difficulty urinating and dysuria.   Musculoskeletal: Negative for back pain, joint swelling and myalgias.   Skin: Negative for color change and wound.   Allergic/Immunologic: Negative.    Neurological: Negative for seizures and headaches.   Psychiatric/Behavioral: Negative.    All other systems reviewed and are negative.       Physical Exam:  /98   Pulse 71   Temp 98.6 °F (37 °C) (Oral)   Resp 16   Ht 182.9 cm (72\")   Wt 117 kg (257 lb 15 oz)   SpO2 96%   BMI 34.98 kg/m²     Physical Exam  Vitals and nursing note reviewed.   Constitutional:       General: He is awake.      Appearance: Normal appearance.   HENT:      Head: Normocephalic and atraumatic.      Nose: Nose normal.      Mouth/Throat:      Mouth: Mucous membranes are moist.   Eyes:      Extraocular Movements: Extraocular movements intact.      Pupils: Pupils are equal, round, and reactive to light.   Cardiovascular:      Rate and Rhythm: Normal rate and regular rhythm.      Heart sounds: Normal heart sounds.   Pulmonary:      Effort: Pulmonary effort is normal. No respiratory distress.      Breath sounds: Normal breath sounds. No " wheezing, rhonchi or rales.   Abdominal:      General: Bowel sounds are normal.      Palpations: Abdomen is soft.      Tenderness: There is no abdominal tenderness. There is no guarding or rebound.      Comments: No rigidity   Musculoskeletal:         General: No tenderness. Normal range of motion.      Cervical back: Normal range of motion and neck supple.   Skin:     General: Skin is warm and dry.      Coloration: Skin is not jaundiced.   Neurological:      General: No focal deficit present.      Mental Status: He is alert and oriented to person, place, and time. Mental status is at baseline.      Sensory: Sensation is intact.      Motor: Motor function is intact.      Coordination: Coordination is intact.   Psychiatric:         Attention and Perception: Attention and perception normal.         Mood and Affect: Mood and affect normal.         Speech: Speech normal.         Behavior: Behavior normal.         Judgment: Judgment normal.                  Procedures:  Procedures      Medical Decision Making:      Comorbidities that affect care:    Bipolar, Anxiety    External Notes reviewed:    Imaging review: CT head without contrast 1/2/2023 shows no active disease.      The following orders were placed and all results were independently analyzed by me:  Orders Placed This Encounter   Procedures   • XR Chest 1 View   • Council Draw   • Comprehensive Metabolic Panel   • BNP   • Single High Sensitivity Troponin T   • CBC Auto Differential   • D-dimer, Quantitative   • Undress & Gown   • Continuous Pulse Oximetry   • Vital Signs   • ECG 12 Lead Chest Pain   • CBC & Differential   • Green Top (Gel)   • Lavender Top   • Gold Top - SST   • Light Blue Top       Medications Given in the Emergency Department:  Medications   ondansetron (ZOFRAN) injection 4 mg (4 mg Intravenous Given 4/10/23 0951)   famotidine (PEPCID) injection 20 mg (20 mg Intravenous Given 4/10/23 0951)        ED Course:    ED Course as of 04/10/23 7107    Mon Apr 10, 2023   0900 I have personally interpreted the EKG today and it shows no evidence of any acute ischemia or heart arrhythmia. [RP]      ED Course User Index  [RP] Girish Diop MD       Labs:    Lab Results (last 24 hours)     Procedure Component Value Units Date/Time    CBC & Differential [[id number]]  (Abnormal) Collected: 04/10/23 0759    Specimen: Blood Updated: 04/10/23 0807    Narrative:      The following orders were created for panel order CBC & Differential.  Procedure                               Abnormality         Status                     ---------                               -----------         ------                     CBC Auto Differential[[id number]]        Abnormal            Final result                 Please view results for these tests on the individual orders.    Comprehensive Metabolic Panel [[id number]]  (Abnormal) Collected: 04/10/23 0759    Specimen: Blood Updated: 04/10/23 0826     Glucose 99 mg/dL      BUN 11 mg/dL      Creatinine 0.78 mg/dL      Sodium 137 mmol/L      Potassium 4.1 mmol/L      Comment: Slight hemolysis detected by analyzer. Results may be affected.        Chloride 105 mmol/L      CO2 22.7 mmol/L      Calcium 8.8 mg/dL      Total Protein 6.5 g/dL      Albumin 4.0 g/dL      ALT (SGPT) 55 U/L      AST (SGOT) 27 U/L      Alkaline Phosphatase 104 U/L      Total Bilirubin <0.2 mg/dL      Globulin 2.5 gm/dL      A/G Ratio 1.6 g/dL      BUN/Creatinine Ratio 14.1     Anion Gap 9.3 mmol/L      eGFR 129.3 mL/min/1.73     Narrative:      GFR Normal >60  Chronic Kidney Disease <60  Kidney Failure <15      BNP [[id number]]  (Normal) Collected: 04/10/23 0759    Specimen: Blood Updated: 04/10/23 0823     proBNP 93.3 pg/mL     Narrative:      Among patients with dyspnea, NT-proBNP is highly sensitive for the detection of acute congestive heart failure. In addition NT-proBNP of <300 pg/ml effectively rules out acute congestive heart failure with 99% negative predictive  value.      Single High Sensitivity Troponin T [[id number]]  (Normal) Collected: 04/10/23 0759    Specimen: Blood Updated: 04/10/23 0826     HS Troponin T <6 ng/L     Narrative:      High Sensitive Troponin T Reference Range:  <10.0 ng/L- Negative Female for AMI  <15.0 ng/L- Negative Male for AMI  >=10 - Abnormal Female indicating possible myocardial injury.  >=15 - Abnormal Male indicating possible myocardial injury.   Clinicians would have to utilize clinical acumen, EKG, Troponin, and serial changes to determine if it is an Acute Myocardial Infarction or myocardial injury due to an underlying chronic condition.         CBC Auto Differential [[id number]]  (Abnormal) Collected: 04/10/23 0759    Specimen: Blood Updated: 04/10/23 0807     WBC 9.64 10*3/mm3      RBC 5.54 10*6/mm3      Hemoglobin 15.3 g/dL      Hematocrit 44.9 %      MCV 81.0 fL      MCH 27.6 pg      MCHC 34.1 g/dL      RDW 12.5 %      RDW-SD 36.8 fl      MPV 9.2 fL      Platelets 285 10*3/mm3      Neutrophil % 62.8 %      Lymphocyte % 27.7 %      Monocyte % 6.5 %      Eosinophil % 2.2 %      Basophil % 0.3 %      Immature Grans % 0.5 %      Neutrophils, Absolute 6.05 10*3/mm3      Lymphocytes, Absolute 2.67 10*3/mm3      Monocytes, Absolute 0.63 10*3/mm3      Eosinophils, Absolute 0.21 10*3/mm3      Basophils, Absolute 0.03 10*3/mm3      Immature Grans, Absolute 0.05 10*3/mm3      nRBC 0.0 /100 WBC     D-dimer, Quantitative [[id number]]  (Normal) Collected: 04/10/23 0759    Specimen: Blood Updated: 04/10/23 1208     D-Dimer, Quantitative <0.27 MCGFEU/mL     Narrative:      According to the assay 's published package insert, a normal (<0.50 MCGFEU/mL) D-dimer result in conjunction with a non-high clinical probability assessment, excludes deep vein thrombosis (DVT) and pulmonary embolism (PE) with high sensitivity.    D-dimer values increase with age and this can make VTE exclusion of an older population difficult. To address this, the American  "[employer], based on best available evidence and recent guidelines, recommends that clinicians use age-adjusted D-dimer thresholds in patients greater than 50 years of age with: a) a low probability of PE who do not meet all Pulmonary Embolism Rule Out Criteria, or b) in those with intermediate probability of PE.   The formula for an age-adjusted D-dimer cut-off is \"age/100\".  For example, a 60 year old patient would have an age-adjusted cut-off of 0.60 MCGFEU/mL and an 80 year old 0.80 MCGFEU/mL.           Imaging:    XR Chest 1 View    Result Date: 4/10/2023  PROCEDURE: XR CHEST 1 VW  COMPARISON: None  INDICATIONS: SOA Triage Protocol  FINDINGS:  Heart size and pulmonary vasculature within normal limits.  Lungs clear.  Costophrenic angles sharp       No active cardiopulmonary disease       MIMI GUTIERREZ MD       Electronically Signed and Approved By: MIMI GUTIERREZ MD on 4/10/2023 at 8:49                 Differential Diagnosis and Discussion:    Vomiting: Differential diagnosis includes but is not limited to migraine, labyrinthine disorders, psychogenic, metabolic and endocrine causes, peptic ulcer, gastric outlet obstruction, gastritis, gastroenteritis, appendicitis, intestinal obstruction, paralytic ileus, food poisoning, cholecystitis, acute hepatitis, acute pancreatitis, acute febrile illness, and myocardial infarction.    All labs were reviewed and interpreted by me.  All X-rays were independently reviewed by me.  EKG was interpreted by me.    [facility]         Patient Care Considerations:    CT CHEST: I considered ordering a CT scan of the chest, however D-dimer is negative      Consultants/Shared Management Plan:    None    Social Determinants of Health:    Patient is independent, reliable, and has access to care.       Disposition and Care Coordination:    Discharged: The patient is suitable and stable for discharge with no need for consideration of observation or admission.    I have explained the " patient´s condition, diagnoses and treatment plan based on the information available to me at this time. I have answered questions and addressed any concerns. The patient has a good  understanding of the patient´s diagnosis, condition, and treatment plan as can be expected at this point. The vital signs have been stable. The patient´s condition is stable and appropriate for discharge from the emergency department.      The patient will pursue further outpatient evaluation with the primary care physician or other designated or consulting physician as outlined in the discharge instructions. They are agreeable to this plan of care and follow-up instructions have been explained in detail. The patient has received these instructions in written format and have expressed an understanding of the discharge instructions. The patient is aware that any significant change in condition or worsening of symptoms should prompt an immediate return to this or the closest emergency department or call to 911.    Final diagnoses:   Acute nonspecific chest pain with low risk of coronary artery disease   Opioid withdrawal        ED Disposition     ED Disposition   Discharge    Condition   Stable    Comment   --             This medical record created using voice recognition software.    Documentation assistance provided by Oneyda Darden acting as scribe for Girish Diop MD. Information recorded by the scribe was done at my direction and has been verified and validated by me.          Oneyda Darden  04/10/23 0909       Girish Diop MD  04/10/23 7707

## 2023-04-11 ENCOUNTER — HOSPITAL ENCOUNTER (EMERGENCY)
Facility: HOSPITAL | Age: 23
Discharge: HOME | End: 2023-04-11
Attending: EMERGENCY MEDICINE | Admitting: EMERGENCY MEDICINE
Payer: MEDICAID

## 2023-04-11 VITALS
TEMPERATURE: 97.8 F | HEIGHT: 72 IN | HEART RATE: 72 BPM | WEIGHT: 264.55 LBS | BODY MASS INDEX: 35.83 KG/M2 | SYSTOLIC BLOOD PRESSURE: 129 MMHG | OXYGEN SATURATION: 100 % | RESPIRATION RATE: 18 BRPM | DIASTOLIC BLOOD PRESSURE: 77 MMHG

## 2023-04-11 DIAGNOSIS — R45.851 SUICIDAL IDEATION: ICD-10-CM

## 2023-04-11 DIAGNOSIS — F19.10 IV DRUG ABUSE: Primary | ICD-10-CM

## 2023-04-11 LAB
ALBUMIN SERPL-MCNC: 4.4 G/DL (ref 3.5–5.2)
ALBUMIN/GLOB SERPL: 1.6 G/DL
ALP SERPL-CCNC: 93 U/L (ref 39–117)
ALT SERPL W P-5'-P-CCNC: 96 U/L (ref 1–41)
AMPHET+METHAMPHET UR QL: NEGATIVE
ANION GAP SERPL CALCULATED.3IONS-SCNC: 11.6 MMOL/L (ref 5–15)
APAP SERPL-MCNC: <5 MCG/ML (ref 0–30)
AST SERPL-CCNC: 57 U/L (ref 1–40)
BARBITURATES UR QL SCN: NEGATIVE
BASOPHILS # BLD AUTO: 0.02 10*3/MM3 (ref 0–0.2)
BASOPHILS NFR BLD AUTO: 0.2 % (ref 0–1.5)
BENZODIAZ UR QL SCN: NEGATIVE
BILIRUB SERPL-MCNC: 0.3 MG/DL (ref 0–1.2)
BUN SERPL-MCNC: 6 MG/DL (ref 6–20)
BUN/CREAT SERPL: 7.1 (ref 7–25)
CALCIUM SPEC-SCNC: 9.2 MG/DL (ref 8.6–10.5)
CANNABINOIDS SERPL QL: NEGATIVE
CHLORIDE SERPL-SCNC: 103 MMOL/L (ref 98–107)
CO2 SERPL-SCNC: 26.4 MMOL/L (ref 22–29)
COCAINE UR QL: NEGATIVE
CREAT SERPL-MCNC: 0.84 MG/DL (ref 0.76–1.27)
DEPRECATED RDW RBC AUTO: 37.7 FL (ref 37–54)
EGFRCR SERPLBLD CKD-EPI 2021: 126.4 ML/MIN/1.73
EOSINOPHIL # BLD AUTO: 0.09 10*3/MM3 (ref 0–0.4)
EOSINOPHIL NFR BLD AUTO: 0.9 % (ref 0.3–6.2)
ERYTHROCYTE [DISTWIDTH] IN BLOOD BY AUTOMATED COUNT: 12.9 % (ref 12.3–15.4)
ETHANOL BLD-MCNC: <10 MG/DL (ref 0–10)
ETHANOL UR QL: <0.01 %
GLOBULIN UR ELPH-MCNC: 2.7 GM/DL
GLUCOSE SERPL-MCNC: 92 MG/DL (ref 65–99)
HCT VFR BLD AUTO: 45.1 % (ref 37.5–51)
HGB BLD-MCNC: 15.4 G/DL (ref 13–17.7)
HOLD SPECIMEN: NORMAL
HOLD SPECIMEN: NORMAL
IMM GRANULOCYTES # BLD AUTO: 0.05 10*3/MM3 (ref 0–0.05)
IMM GRANULOCYTES NFR BLD AUTO: 0.5 % (ref 0–0.5)
LYMPHOCYTES # BLD AUTO: 1.53 10*3/MM3 (ref 0.7–3.1)
LYMPHOCYTES NFR BLD AUTO: 15.7 % (ref 19.6–45.3)
MCH RBC QN AUTO: 27.7 PG (ref 26.6–33)
MCHC RBC AUTO-ENTMCNC: 34.1 G/DL (ref 31.5–35.7)
MCV RBC AUTO: 81.3 FL (ref 79–97)
METHADONE UR QL SCN: NEGATIVE
MONOCYTES # BLD AUTO: 0.72 10*3/MM3 (ref 0.1–0.9)
MONOCYTES NFR BLD AUTO: 7.4 % (ref 5–12)
NEUTROPHILS NFR BLD AUTO: 7.31 10*3/MM3 (ref 1.7–7)
NEUTROPHILS NFR BLD AUTO: 75.3 % (ref 42.7–76)
NRBC BLD AUTO-RTO: 0 /100 WBC (ref 0–0.2)
OPIATES UR QL: NEGATIVE
OXYCODONE UR QL SCN: NEGATIVE
PLATELET # BLD AUTO: 284 10*3/MM3 (ref 140–450)
PMV BLD AUTO: 9.1 FL (ref 6–12)
POTASSIUM SERPL-SCNC: 3.7 MMOL/L (ref 3.5–5.2)
PROT SERPL-MCNC: 7.1 G/DL (ref 6–8.5)
RBC # BLD AUTO: 5.55 10*6/MM3 (ref 4.14–5.8)
SALICYLATES SERPL-MCNC: <0.3 MG/DL
SODIUM SERPL-SCNC: 141 MMOL/L (ref 136–145)
WBC NRBC COR # BLD: 9.72 10*3/MM3 (ref 3.4–10.8)
WHOLE BLOOD HOLD COAG: NORMAL
WHOLE BLOOD HOLD SPECIMEN: NORMAL

## 2023-04-11 PROCEDURE — 80307 DRUG TEST PRSMV CHEM ANLYZR: CPT | Performed by: EMERGENCY MEDICINE

## 2023-04-11 PROCEDURE — 85025 COMPLETE CBC W/AUTO DIFF WBC: CPT | Performed by: EMERGENCY MEDICINE

## 2023-04-11 PROCEDURE — 82077 ASSAY SPEC XCP UR&BREATH IA: CPT | Performed by: EMERGENCY MEDICINE

## 2023-04-11 PROCEDURE — 80053 COMPREHEN METABOLIC PANEL: CPT | Performed by: EMERGENCY MEDICINE

## 2023-04-11 PROCEDURE — 36415 COLL VENOUS BLD VENIPUNCTURE: CPT

## 2023-04-11 PROCEDURE — 80179 DRUG ASSAY SALICYLATE: CPT | Performed by: EMERGENCY MEDICINE

## 2023-04-11 PROCEDURE — 80143 DRUG ASSAY ACETAMINOPHEN: CPT | Performed by: EMERGENCY MEDICINE

## 2023-04-11 PROCEDURE — 99283 EMERGENCY DEPT VISIT LOW MDM: CPT

## 2023-04-11 RX ORDER — SODIUM CHLORIDE 0.9 % (FLUSH) 0.9 %
10 SYRINGE (ML) INJECTION AS NEEDED
Status: DISCONTINUED | OUTPATIENT
Start: 2023-04-11 | End: 2023-04-11 | Stop reason: HOSPADM

## 2023-04-11 NOTE — ED PROVIDER NOTES
"Time: 1:48 PM EDT  Date of encounter:  4/11/2023  Independent Historian/Clinical History and Information was obtained by: Patient, Family and Chart  Chief Complaint: suicidal ideation  History is limited by: N/A  Room number: 31/31     History of Present Illness:  HPI  Patient is a 22 y.o. year old male who presents to the Emergency Department via private car for evaluation of suicidal ideation. Patient has family at bedside on initial evaluation. Patient has a medical history of suicidal ideation, anxiety, bipolar disorder (type 1), depression, post-traumatic stress disorder (PTSD) and substance abuse. Patient has a surgical history of no clinical significance. Patient has a social history of current smoker, vape user and IV substance abuse (methamphetamines, heroin).    Patient was evaluated at [facility] recently (04/10/2023) for chest pain and vomiting symptoms. Patient was treated and discharged.     Patient is experiencing symptoms of suicidal ideation with no plan but states \"I know if I end up back on the streets I'll end up using and overdosing like my girlfriend\". Patient states these feelings started approximately less than 24 hours ago. Patient admits that if he had somewhere to be where he would be away from \"the streets and temptation to use drugs\" he would not feel suicidal. Patient states they are in no pain and rates their pain level 0 out of 10 at rest and with movement or activity. Patient states no modifying factors. Patient denies all other symptoms. All other systems reviews are negative.    Patient has not tried anything for symptom relief. Patient is out of Lithium prescription for the past month.    Patient Care Team  Primary Care Provider: Provider, No Known    Past Medical History:  Allergies   Allergen Reactions   • Fish-Derived Products Anaphylaxis   • Onion Anaphylaxis   • Tomato Itching     Past Medical History:   Diagnosis Date   • Anxiety    • Bipolar 1 " "disorder    • Depression    • PTSD (post-traumatic stress disorder)      Past Surgical History:   Procedure Laterality Date   • KNEE ACL RECONSTRUCTION Right      History reviewed. No pertinent family history.    Home Medications:  Prior to Admission medications    Medication Sig Start Date End Date Taking? Authorizing Provider   FLUoxetine (PROzac) 10 MG capsule Take 1 capsule by mouth Daily.    Provider, MD Russ   ondansetron ODT (ZOFRAN-ODT) 4 MG disintegrating tablet Place 1 tablet on the tongue Every 6 (Six) Hours As Needed for Nausea or Vomiting. 4/10/23   Girish Diop MD   pantoprazole (PROTONIX) 40 MG EC tablet Take 1 tablet by mouth Daily. Indications: Gastroesophageal Reflux Disease 7/1/22   Roger Valdovinos MD        Social History:  Social History     Tobacco Use   • Smoking status: Every Day     Packs/day: 0.50     Types: Cigarettes   • Smokeless tobacco: Never   Vaping Use   • Vaping Use: Every day   • Substances: Nicotine, Flavoring   Substance Use Topics   • Alcohol use: Not Currently     Comment: social   • Drug use: Yes     Types: Methamphetamines     Comment: last use 3 weeks ago       Review of Systems:   Review of Systems   Constitutional: Negative.    HENT: Negative.    Eyes: Negative.    Respiratory: Negative.    Cardiovascular: Negative.    Gastrointestinal: Negative.    Endocrine: Negative.    Genitourinary: Negative.    Musculoskeletal: Negative.    Skin: Negative.    Allergic/Immunologic: Negative.    Neurological: Negative.    Hematological: Negative.    Psychiatric/Behavioral: Positive for suicidal ideas.   All other systems reviewed and are negative.         Physical Exam:   /77 (BP Location: Left arm, Patient Position: Sitting)   Pulse 72   Temp 97.8 °F (36.6 °C) (Oral)   Resp 18   Ht 182.9 cm (72.01\")   Wt 120 kg (264 lb 8.8 oz)   SpO2 100%   BMI 35.87 kg/m²     Physical Exam  Vitals and nursing note reviewed.   Constitutional:       General: He is awake.      " Appearance: Normal appearance.   HENT:      Head: Normocephalic and atraumatic.      Nose: Nose normal.      Mouth/Throat:      Mouth: Mucous membranes are moist.   Eyes:      Extraocular Movements: Extraocular movements intact.      Pupils: Pupils are equal, round, and reactive to light.   Cardiovascular:      Rate and Rhythm: Normal rate and regular rhythm.      Heart sounds: Normal heart sounds.   Pulmonary:      Effort: Pulmonary effort is normal. No respiratory distress.      Breath sounds: Normal breath sounds. No wheezing, rhonchi or rales.   Abdominal:      General: Bowel sounds are normal.      Palpations: Abdomen is soft.      Tenderness: There is no abdominal tenderness. There is no guarding or rebound.      Comments: No rigidity   Musculoskeletal:         General: No tenderness. Normal range of motion.      Cervical back: Normal range of motion and neck supple.   Skin:     General: Skin is warm and dry.      Coloration: Skin is not jaundiced.   Neurological:      General: No focal deficit present.      Mental Status: He is alert and oriented to person, place, and time. Mental status is at baseline.      Sensory: Sensation is intact.      Motor: Motor function is intact.      Coordination: Coordination is intact.   Psychiatric:         Attention and Perception: Attention and perception normal.         Mood and Affect: Mood and affect normal.         Speech: Speech normal.         Behavior: Behavior normal.         Judgment: Judgment normal.                Procedures:  Procedures    Medical Decision Making:    Comorbidities that affect care:    suicidal ideation, anxiety, bipolar disorder (type 1), depression, post-traumatic stress disorder (PTSD) and substance abuse, current smoker/vape user, IV substance user (methamphetamines and heroin)    External Notes reviewed:    Previous ED Note: [facility] (04/10/2023)     Admission to psychiatry 6/29/2022 under Dr. Valdovinos    The following  orders were placed and all results were independently analyzed by me:  Orders Placed This Encounter   Procedures   • [place] Draw   • Comprehensive Metabolic Panel   • Acetaminophen Level   • Ethanol   • Salicylate Level   • Urine Drug Screen - Urine, Clean Catch   • CBC Auto Differential   • Diet: Regular/House Diet; Texture: Regular Texture (IDDSI 7); Fluid Consistency: Thin (IDDSI 0)   • Cardiac Monitoring   • Continuous Pulse Oximetry   • Vital Signs   • Undress & Gown   • Psych / Access to See   • POC Glucose Once   • Insert Peripheral IV   • Suicide Precautions   • CBC & Differential   • Green Top (Gel)   • Lavender Top   • Gold Top - SST   • Light Blue Top       Medications Given in the Emergency Department:  Medications   sodium chloride 0.9 % flush 10 mL (has no administration in time range)       ED Course:       Labs:  Lab Results (last 24 hours)     Procedure Component Value Units Date/Time    CBC & Differential [[id number]]  (Abnormal) Collected: 04/11/23 1332    Specimen: Blood Updated: 04/11/23 1343    Narrative:      The following orders were created for panel order CBC & Differential.  Procedure                               Abnormality         Status                     ---------                               -----------         ------                     CBC Auto Differential[[id number]]        Abnormal            Final result                 Please view results for these tests on the individual orders.    Comprehensive Metabolic Panel [[id number]]  (Abnormal) Collected: 04/11/23 1332    Specimen: Blood Updated: 04/11/23 1403     Glucose 92 mg/dL      BUN 6 mg/dL      Creatinine 0.84 mg/dL      Sodium 141 mmol/L      Potassium 3.7 mmol/L      Chloride 103 mmol/L      CO2 26.4 mmol/L      Calcium 9.2 mg/dL      Total Protein 7.1 g/dL      Albumin 4.4 g/dL      ALT (SGPT) 96 U/L      AST (SGOT) 57 U/L      Alkaline Phosphatase 93 U/L      Total Bilirubin 0.3 mg/dL      Globulin 2.7 gm/dL      A/G Ratio  1.6 g/dL      BUN/Creatinine Ratio 7.1     Anion Gap 11.6 mmol/L      eGFR 126.4 mL/min/1.73     Narrative:      GFR Normal >60  Chronic Kidney Disease <60  Kidney Failure <15      Acetaminophen Level [[id number]]  (Normal) Collected: 04/11/23 1332    Specimen: Blood Updated: 04/11/23 1403     Acetaminophen <5.0 mcg/mL     Ethanol [[id number]] Collected: 04/11/23 1332    Specimen: Blood Updated: 04/11/23 1403     Ethanol <10 mg/dL      Ethanol % <0.010 %     Narrative:      Ethanol (Plasma)  <10 Essentially Negative    Toxic Concentrations           mg/dL    Flushing, slowing of reflexes    Impaired visual activity         Depression of CNS              >100  Possible Coma                  >300       Salicylate Level [[id number]]  (Normal) Collected: 04/11/23 1332    Specimen: Blood Updated: 04/11/23 1403     Salicylate <0.3 mg/dL     CBC Auto Differential [[id number]]  (Abnormal) Collected: 04/11/23 1332    Specimen: Blood Updated: 04/11/23 1343     WBC 9.72 10*3/mm3      RBC 5.55 10*6/mm3      Hemoglobin 15.4 g/dL      Hematocrit 45.1 %      MCV 81.3 fL      MCH 27.7 pg      MCHC 34.1 g/dL      RDW 12.9 %      RDW-SD 37.7 fl      MPV 9.1 fL      Platelets 284 10*3/mm3      Neutrophil % 75.3 %      Lymphocyte % 15.7 %      Monocyte % 7.4 %      Eosinophil % 0.9 %      Basophil % 0.2 %      Immature Grans % 0.5 %      Neutrophils, Absolute 7.31 10*3/mm3      Lymphocytes, Absolute 1.53 10*3/mm3      Monocytes, Absolute 0.72 10*3/mm3      Eosinophils, Absolute 0.09 10*3/mm3      Basophils, Absolute 0.02 10*3/mm3      Immature Grans, Absolute 0.05 10*3/mm3      nRBC 0.0 /100 WBC     Urine Drug Screen - Urine, Clean Catch [[id number]]  (Normal) Collected: 04/11/23 1511    Specimen: Urine, Clean Catch Updated: 04/11/23 1548     Amphet/Methamphet, Screen Negative     Barbiturates Screen, Urine Negative     Benzodiazepine Screen, Urine Negative     Cocaine Screen, Urine Negative     Opiate Screen Negative     THC,  Screen, Urine Negative     Methadone Screen, Urine Negative     Oxycodone Screen, Urine Negative    Narrative:      Negative Thresholds Per Drugs Screened:    Amphetamines                 500 ng/ml  Barbiturates                 200 ng/ml  Benzodiazepines              100 ng/ml  Cocaine                      300 ng/ml  Methadone                    300 ng/ml  Opiates                      300 ng/ml  Oxycodone                    100 ng/ml  THC                           50 ng/ml    The Normal Value for all drugs tested is negative. This report includes final unconfirmed screening results to be used for medical treatment purposes only. Unconfirmed results must not be used for non-medical purposes such as employment or legal testing. Clinical consideration should be applied to any drug of abuse test, particularly when unconfirmed results are used.                   Imaging:  No Radiology Exams Resulted Within Past 24 Hours    Differential Diagnosis and Discussion:    Altered Mental Status: Based on the patient's signs and symptoms, differential diagnosis includes but is not limited to meningitis, stroke, sepsis, subarachnoid hemorrhage, intracranial bleeding, encephalitis, and metabolic encephalopathy.    All labs were reviewed and interpreted by me.    [facility]         Patient Care Considerations:    CONSULT: I considered consulting Psychiatry, however Patient states that as long as he has a facility to stay he is not actively planning to kill himself.  Most of his concerns with suicidality relate to homelessness.  He feels that if he can have a facility stay and he will not resort to IV drug abuse and thus not feel suicidal.    Consultants/Shared Management Plan:    Consultant: I have discussed the case with Case management who agrees to consult on the patient.    Social Determinants of Health:    Patient is independent, reliable, and has access to care.     Disposition and Care Coordination:    Discharged: The patient is  suitable and stable for discharge with no need for consideration of observation or admission.  The patient is being discharged with a transportation set up to a psychiatric facility in [place].      I have explained the patient´s condition, diagnoses and treatment plan based on the information available to me at this time. I have answered questions and addressed any concerns. The patient has a good  understanding of the patient´s diagnosis, condition, and treatment plan as can be expected at this point. The vital signs have been stable. The patient´s condition is stable and appropriate for discharge from the emergency department.      The patient will pursue further outpatient evaluation with the primary care physician or other designated or consulting physician as outlined in the discharge instructions. They are agreeable to this plan of care and follow-up instructions have been explained in detail. The patient has received these instructions in written format and have expressed an understanding of the discharge instructions. The patient is aware that any significant change in condition or worsening of symptoms should prompt an immediate return to this or the closest emergency department or call to 911.    Final diagnoses:   IV drug abuse   Suicidal ideation        ED Disposition     ED Disposition   Discharge    Condition   Stable    Comment   --             This medical record created using voice recognition software.    Documentation assistance provided by Radha Akers acting a scribe for Girish Diop MD. Information recorded by the scribe was verified and validated at my direction.     Radha Akers  04/11/23 3073       Girish Diop MD  04/11/23 5607       Girish Diop MD  04/11/23 0912

## 2023-04-11 NOTE — SIGNIFICANT NOTE
04/11/23 1415   Plan   Plan Comments JOANA Browne met with pt at bedside per provider request. Pt states that he is homeless but was approved to go to a place in [place] called [facility]. JOANA called and confirmed with [facility] that pt does have bed waiting for him at this facility, he just needs transportation there. JOANA scheduled pt transportation with [employer].  at 6 pm

## 2023-05-06 ENCOUNTER — HOSPITAL ENCOUNTER (EMERGENCY)
Facility: HOSPITAL | Age: 23
Discharge: HOME | End: 2023-05-06
Attending: EMERGENCY MEDICINE
Payer: MEDICAID

## 2023-05-06 VITALS
SYSTOLIC BLOOD PRESSURE: 122 MMHG | RESPIRATION RATE: 18 BRPM | HEART RATE: 99 BPM | HEIGHT: 72 IN | TEMPERATURE: 98.2 F | DIASTOLIC BLOOD PRESSURE: 77 MMHG | OXYGEN SATURATION: 99 % | BODY MASS INDEX: 35.88 KG/M2

## 2023-05-06 DIAGNOSIS — T67.5XXA HEAT EXHAUSTION, INITIAL ENCOUNTER: ICD-10-CM

## 2023-05-06 DIAGNOSIS — E86.0 MILD DEHYDRATION: Primary | ICD-10-CM

## 2023-05-06 LAB
ALBUMIN SERPL-MCNC: 4.7 G/DL (ref 3.5–5.2)
ALBUMIN/GLOB SERPL: 1.8 G/DL
ALP SERPL-CCNC: 89 U/L (ref 39–117)
ALT SERPL W P-5'-P-CCNC: 144 U/L (ref 1–41)
ANION GAP SERPL CALCULATED.3IONS-SCNC: 13.8 MMOL/L (ref 5–15)
AST SERPL-CCNC: 62 U/L (ref 1–40)
BASOPHILS # BLD AUTO: 0.03 10*3/MM3 (ref 0–0.2)
BASOPHILS NFR BLD AUTO: 0.2 % (ref 0–1.5)
BILIRUB SERPL-MCNC: 0.7 MG/DL (ref 0–1.2)
BUN SERPL-MCNC: 17 MG/DL (ref 6–20)
BUN/CREAT SERPL: 12.3 (ref 7–25)
CALCIUM SPEC-SCNC: 10.4 MG/DL (ref 8.6–10.5)
CHLORIDE SERPL-SCNC: 108 MMOL/L (ref 98–107)
CK SERPL-CCNC: 111 U/L (ref 20–200)
CO2 SERPL-SCNC: 25.2 MMOL/L (ref 22–29)
CREAT SERPL-MCNC: 1.38 MG/DL (ref 0.76–1.27)
DEPRECATED RDW RBC AUTO: 38.9 FL (ref 37–54)
EGFRCR SERPLBLD CKD-EPI 2021: 74.1 ML/MIN/1.73
EOSINOPHIL # BLD AUTO: 0.04 10*3/MM3 (ref 0–0.4)
EOSINOPHIL NFR BLD AUTO: 0.3 % (ref 0.3–6.2)
ERYTHROCYTE [DISTWIDTH] IN BLOOD BY AUTOMATED COUNT: 13.4 % (ref 12.3–15.4)
GLOBULIN UR ELPH-MCNC: 2.6 GM/DL
GLUCOSE SERPL-MCNC: 90 MG/DL (ref 65–99)
HCT VFR BLD AUTO: 47.1 % (ref 37.5–51)
HGB BLD-MCNC: 16.1 G/DL (ref 13–17.7)
IMM GRANULOCYTES # BLD AUTO: 0.05 10*3/MM3 (ref 0–0.05)
IMM GRANULOCYTES NFR BLD AUTO: 0.4 % (ref 0–0.5)
LYMPHOCYTES # BLD AUTO: 2.4 10*3/MM3 (ref 0.7–3.1)
LYMPHOCYTES NFR BLD AUTO: 17.1 % (ref 19.6–45.3)
MCH RBC QN AUTO: 27.7 PG (ref 26.6–33)
MCHC RBC AUTO-ENTMCNC: 34.2 G/DL (ref 31.5–35.7)
MCV RBC AUTO: 81.1 FL (ref 79–97)
MONOCYTES # BLD AUTO: 1.03 10*3/MM3 (ref 0.1–0.9)
MONOCYTES NFR BLD AUTO: 7.3 % (ref 5–12)
NEUTROPHILS NFR BLD AUTO: 10.5 10*3/MM3 (ref 1.7–7)
NEUTROPHILS NFR BLD AUTO: 74.7 % (ref 42.7–76)
NRBC BLD AUTO-RTO: 0.1 /100 WBC (ref 0–0.2)
PLATELET # BLD AUTO: 276 10*3/MM3 (ref 140–450)
PMV BLD AUTO: 9.2 FL (ref 6–12)
POTASSIUM SERPL-SCNC: 4 MMOL/L (ref 3.5–5.2)
PROT SERPL-MCNC: 7.3 G/DL (ref 6–8.5)
RBC # BLD AUTO: 5.81 10*6/MM3 (ref 4.14–5.8)
SODIUM SERPL-SCNC: 147 MMOL/L (ref 136–145)
WBC NRBC COR # BLD: 14.05 10*3/MM3 (ref 3.4–10.8)

## 2023-05-06 PROCEDURE — 93005 ELECTROCARDIOGRAM TRACING: CPT | Performed by: EMERGENCY MEDICINE

## 2023-05-06 PROCEDURE — 85025 COMPLETE CBC W/AUTO DIFF WBC: CPT | Performed by: EMERGENCY MEDICINE

## 2023-05-06 PROCEDURE — 80053 COMPREHEN METABOLIC PANEL: CPT | Performed by: EMERGENCY MEDICINE

## 2023-05-06 PROCEDURE — 99283 EMERGENCY DEPT VISIT LOW MDM: CPT

## 2023-05-06 PROCEDURE — 82550 ASSAY OF CK (CPK): CPT | Performed by: EMERGENCY MEDICINE

## 2023-05-06 RX ORDER — SODIUM CHLORIDE 0.9 % (FLUSH) 0.9 %
10 SYRINGE (ML) INJECTION AS NEEDED
Status: DISCONTINUED | OUTPATIENT
Start: 2023-05-06 | End: 2023-05-06 | Stop reason: HOSPADM

## 2023-05-06 RX ADMIN — SODIUM CHLORIDE, POTASSIUM CHLORIDE, SODIUM LACTATE AND CALCIUM CHLORIDE 1000 ML: 600; 310; 30; 20 INJECTION, SOLUTION INTRAVENOUS at 17:54

## 2023-05-06 NOTE — CASE MANAGEMENT/SOCIAL WORK
Discharge Planning Assessment  [facility]     Patient Name: Linwood Patel  MRN: [id number]  Today's Date: 5/6/2023    Admit Date: 5/6/2023        Discharge Needs Assessment    No documentation.                Discharge Plan     Row Name 05/06/23 1928       Plan    Plan Comments Entered room, introduced self and explained role to patient and friend at bedside [place]; Patient gave permission for me to speak in front of friend;He reports they were with his mom today when she got arrested for drugs so they started to walk to [place] from the [place] [facility] and he began to feel sick- EMS responded- Patient advises he has nowhere to go, nobody who can pick him and his friend up- unable to return to moms home- Patient requesting assistance to return to [place]- offered a ride to the [place] bus station- pt advises he has no money for a bus ticket- offered a ride to a shelter in [place]- Patient agreed to [facility]- he also advises he doesnt have an ID so there are some shelters he cant go to. Call placed to [facility]- spoke w/[facility] who advised I could send both patient and friend- updated primary RN and ER provider-  will order [employer] ride upon d/ c. Provided homeless street tips to patient and discussed resources available.               Continued Care and Services - Admitted Since 5/6/2023    Coordination has not been started for this encounter.          Demographic Summary    No documentation.                Functional Status    No documentation.                Psychosocial    No documentation.                Abuse/Neglect    No documentation.                Legal    No documentation.                Substance Abuse    No documentation.                Patient Forms    No documentation.                   Roseann Oconnor RN

## 2023-05-06 NOTE — DISCHARGE INSTRUCTIONS
Make sure you are drinking an adequate amount of fluids.  Return to the emergency department for shortness of breath, nausea, vomiting, chest pain, abdominal pain, or other concern.

## 2023-05-06 NOTE — ED NOTES
Patient to ER via ems from side of road. Patient and friend was walking from [facility] to [place] [address]. Patient was walking for 4 hours and feels light headed, SOA,   BS 91  Patient was given 500MLs normal saline with EMS and feels better

## 2023-05-06 NOTE — ED PROVIDER NOTES
EMERGENCY DEPARTMENT ENCOUNTER    Room Number:  24/24  Date seen:  5/6/2023  PCP: Provider, No Known  Historian: Patient, EMS        Chief Complaint: Shortness of breath, lightheadedness  A complete HPI/ROS/PMH/PSH/SH/FH are unobtainable due to: Nothing  Context: Linwood Patel is a 22 y.o. male who presents to the ED by EMS c/o shortness of breath and lightheadedness.  Patient was picked up on the side of the road.  Patient states he is currently walking from [place] to [place].  He had been walking for about 4 to 5 hours today when he became lightheaded and short of breath.  He felt very sweaty and nauseated.  He then vomited several times.  He did not pass out.  Denies chest pain, abdominal pain, shortness of breath, or fever.  Per EMS, glucose was 91.  He was given 500 mL of IV fluids by EMS and is currently feeling better.  Denies recent illness, cough, or diarrhea.            PAST MEDICAL HISTORY  Active Ambulatory Problems     Diagnosis Date Noted   • Major depressive disorder, recurrent episode, moderate 06/23/2022   • Post traumatic stress disorder (PTSD) 06/24/2022   • Amphetamine misuse 06/24/2022   • Tobacco abuse 06/24/2022   • Gastroesophageal reflux disease 06/24/2022   • Obstructive sleep apnea 06/24/2022   • Obesity 06/24/2022   • Depression 06/28/2022     Resolved Ambulatory Problems     Diagnosis Date Noted   • No Resolved Ambulatory Problems     Past Medical History:   Diagnosis Date   • Anxiety    • Bipolar 1 disorder    • PTSD (post-traumatic stress disorder)          PAST SURGICAL HISTORY  Past Surgical History:   Procedure Laterality Date   • KNEE ACL RECONSTRUCTION Right          FAMILY HISTORY  History reviewed. No pertinent family history.      SOCIAL HISTORY  Social History     Socioeconomic History   • Marital status: Single   Tobacco Use   • Smoking status: Every Day     Packs/day: 0.50     Types: Cigarettes   • Smokeless tobacco: Never   Vaping Use   • Vaping Use: Every day   •  Substances: Nicotine, Flavoring   Substance and Sexual Activity   • Alcohol use: Not Currently     Comment: social   • Drug use: Yes     Types: Methamphetamines     Comment: last use 3 weeks ago   • Sexual activity: Defer         ALLERGIES  Fish-derived products, Onion, and Tomato        REVIEW OF SYSTEMS  Review of Systems     All systems have been reviewed and are negative except as as discussed in the HPI    PHYSICAL EXAM  ED Triage Vitals [05/06/23 1721]   Temp Heart Rate Resp BP SpO2   98.2 °F (36.8 °C) (!) 130 24 128/76 98 %      Temp src Heart Rate Source Patient Position BP Location FiO2 (%)   -- -- -- -- --       Physical Exam      GENERAL: Awake, alert, oriented x3.  Well-developed, well-nourished male.  Resting comfortably in no acute distress  HENT: NCAT, nares patent, mildly dry mucous membrane  EYES: no scleral icterus  CV: regular rhythm, tachycardic  RESPIRATORY: normal effort, clear to auscultation bilaterally  ABDOMEN: soft, obese, nontender  MUSCULOSKELETAL: Extremities are nontender with full range of motion  NEURO: Speech is normal.  No facial droop.  Follows commands  PSYCH:  calm, cooperative  SKIN: warm, dry    Vital signs and nursing notes reviewed.          LAB RESULTS  Recent Results (from the past 24 hour(s))   ECG 12 Lead Dyspnea    Collection Time: 05/06/23  5:50 PM   Result Value Ref Range    QT Interval 346 ms   Comprehensive Metabolic Panel    Collection Time: 05/06/23  5:53 PM    Specimen: Blood   Result Value Ref Range    Glucose 90 65 - 99 mg/dL    BUN 17 6 - 20 mg/dL    Creatinine 1.38 (H) 0.76 - 1.27 mg/dL    Sodium 147 (H) 136 - 145 mmol/L    Potassium 4.0 3.5 - 5.2 mmol/L    Chloride 108 (H) 98 - 107 mmol/L    CO2 25.2 22.0 - 29.0 mmol/L    Calcium 10.4 8.6 - 10.5 mg/dL    Total Protein 7.3 6.0 - 8.5 g/dL    Albumin 4.7 3.5 - 5.2 g/dL    ALT (SGPT) 144 (H) 1 - 41 U/L    AST (SGOT) 62 (H) 1 - 40 U/L    Alkaline Phosphatase 89 39 - 117 U/L    Total Bilirubin 0.7 0.0 - 1.2 mg/dL     Globulin 2.6 gm/dL    A/G Ratio 1.8 g/dL    BUN/Creatinine Ratio 12.3 7.0 - 25.0    Anion Gap 13.8 5.0 - 15.0 mmol/L    eGFR 74.1 >60.0 mL/min/1.73   CK    Collection Time: 05/06/23  5:53 PM    Specimen: Blood   Result Value Ref Range    Creatine Kinase 111 20 - 200 U/L   CBC Auto Differential    Collection Time: 05/06/23  5:53 PM    Specimen: Blood   Result Value Ref Range    WBC 14.05 (H) 3.40 - 10.80 10*3/mm3    RBC 5.81 (H) 4.14 - 5.80 10*6/mm3    Hemoglobin 16.1 13.0 - 17.7 g/dL    Hematocrit 47.1 37.5 - 51.0 %    MCV 81.1 79.0 - 97.0 fL    MCH 27.7 26.6 - 33.0 pg    MCHC 34.2 31.5 - 35.7 g/dL    RDW 13.4 12.3 - 15.4 %    RDW-SD 38.9 37.0 - 54.0 fl    MPV 9.2 6.0 - 12.0 fL    Platelets 276 140 - 450 10*3/mm3    Neutrophil % 74.7 42.7 - 76.0 %    Lymphocyte % 17.1 (L) 19.6 - 45.3 %    Monocyte % 7.3 5.0 - 12.0 %    Eosinophil % 0.3 0.3 - 6.2 %    Basophil % 0.2 0.0 - 1.5 %    Immature Grans % 0.4 0.0 - 0.5 %    Neutrophils, Absolute 10.50 (H) 1.70 - 7.00 10*3/mm3    Lymphocytes, Absolute 2.40 0.70 - 3.10 10*3/mm3    Monocytes, Absolute 1.03 (H) 0.10 - 0.90 10*3/mm3    Eosinophils, Absolute 0.04 0.00 - 0.40 10*3/mm3    Basophils, Absolute 0.03 0.00 - 0.20 10*3/mm3    Immature Grans, Absolute 0.05 0.00 - 0.05 10*3/mm3    nRBC 0.1 0.0 - 0.2 /100 WBC       Ordered the above labs and reviewed the results.        RADIOLOGY  No Radiology Exams Resulted Within Past 24 Hours    Ordered the above noted radiological studies. Reviewed by me in PACS.            PROCEDURES  Procedures              MEDICATIONS GIVEN IN ER  Medications   sodium chloride 0.9 % flush 10 mL (has no administration in time range)   lactated ringers bolus 1,000 mL (1,000 mL Intravenous New Bag 5/6/23 1754)                   MEDICAL DECISION MAKING, PROGRESS, and CONSULTS    All labs have been independently reviewed by me.  All radiology studies have been reviewed by me and I have also reviewed the radiology report.   EKG's independently viewed and  interpreted by me.  Discussion below represents my analysis of pertinent findings related to patient's condition, differential diagnosis, treatment plan and final disposition.      Additional sources:  - Discussed/ obtained information from independent historians: EMS    - External (non-ED) record review: Patient was seen in the ED at [facility] last month for suicidal ideation.  He has a history of bipolar disorder, PTSD, depression, and IV drug use.  He does not have any prior ED visits or admissions here.    - Chronic or social conditions impacting care: Patient is currently homeless.  He does not currently have a primary care provider          Orders placed during this visit:  Orders Placed This Encounter   Procedures   • Comprehensive Metabolic Panel   • CK   • CBC Auto Differential   • ECG 12 Lead Dyspnea   • Insert Peripheral IV   • CBC & Differential         Additional orders considered but not ordered:  N/A        Differential diagnosis:    Dehydration, volume depletion, electrolyte abnormality, heat exhaustion      Independent interpretation of labs, radiology studies, and discussions with consultants:  ED Course as of 05/06/23 1841   Sat May 06, 2023   1802 EKG personally interpreted by me.  My personal interpretation is:          EKG time: 1750  Rhythm/Rate: Sinus tachycardia, rate 101  P waves and IA: Normal  QRS, axis: Normal  ST and T waves: Normal    Interpreted Contemporaneously by me at 1756, independently viewed  EKG is not significantly changed compared to prior EKG done on 4/10/2023   [WH]   1820 WBC(!): 14.05 [WH]   1821 Hemoglobin: 16.1 [WH]   1827 Creatinine(!): 1.38  0.84 three weeks ago [WH]   1828 Creatine Kinase: 111 [WH]   1828 WBC(!): 14.05 [WH]   1828 Hemoglobin: 16.1 [WH]   1828 ALT (SGPT)(!): 144  96 three weeks ago [WH]   1828 AST (SGOT)(!): 62  Stable [WH]   1837 Test results discussed with the patient.  He is feeling better.  He has been able to drink fluids and eat  a boxed lunch.  Heart rate is now in the 90s.  He is mildly dehydrated.  Symptoms are consistent with heat exhaustion.  Patient will be discharged.  Return precautions were discussed [WH]      ED Course User Index  [WH] Harrison Lewis MD               DIAGNOSIS  Final diagnoses:   Mild dehydration   Heat exhaustion, initial encounter         DISPOSITION  DISCHARGE    Patient discharged in stable condition.    Reviewed implications of results, diagnosis, meds, responsibility to follow up, warning signs and symptoms of possible worsening, potential complications and reasons to return to ER, including chest pain, abdominal pain, persistent vomiting, fainting, shortness of breath, or other concern..    Patient/Family voiced understanding of above instructions.    Discussed plan for discharge, as there is no emergent indication for admission. Patient referred to primary care provider for BP management due to today's BP. Pt/family is agreeable and understands need for follow up and repeat testing.  Pt is aware that discharge does not mean that nothing is wrong but it indicates no emergency is present that requires admission and they must continue care with follow-up as given below or physician of their choice.     FOLLOW-UP  PATIENT CONNECTION - [place] [id number]  [phone]             Medication List      No changes were made to your prescriptions during this visit.                   Latest Documented Vital Signs:  As of 18:41 EDT  BP- 122/77 HR- 99 Temp- 98.2 °F (36.8 °C) O2 sat- 99%              --    Please note that portions of this were completed with a voice recognition program.       Note Disclaimer: At [facility], we believe that sharing information builds trust and better relationships. You are receiving this note because you are receiving care at [facility] or recently visited. It is possible you will see health information before a provider has talked with you about it. This  kind of information can be easy to misunderstand. To help you fully understand what it means for your health, we urge you to discuss this note with your provider.           Harrison Lewis MD  05/06/23 3210

## 2023-05-07 LAB — QT INTERVAL: 346 MS

## 2023-07-08 ENCOUNTER — HOSPITAL ENCOUNTER (EMERGENCY)
Age: 23
Discharge: ANOTHER HOSPITAL | End: 2023-07-09
Attending: EMERGENCY MEDICINE
Payer: MEDICAID

## 2023-07-08 DIAGNOSIS — R45.89 SUICIDAL BEHAVIOR WITHOUT ATTEMPTED SELF-INJURY: Primary | ICD-10-CM

## 2023-07-08 PROCEDURE — 99285 EMERGENCY DEPT VISIT HI MDM: CPT

## 2023-07-08 RX ORDER — METHOCARBAMOL 500 MG/1
500 TABLET, FILM COATED ORAL 4 TIMES DAILY
Status: ON HOLD | COMMUNITY
End: 2023-07-13 | Stop reason: HOSPADM

## 2023-07-08 ASSESSMENT — PAIN - FUNCTIONAL ASSESSMENT: PAIN_FUNCTIONAL_ASSESSMENT: NONE - DENIES PAIN

## 2023-07-09 ENCOUNTER — HOSPITAL ENCOUNTER (INPATIENT)
Age: 23
LOS: 4 days | Discharge: HOME | DRG: 754 | End: 2023-07-13
Attending: PSYCHIATRY & NEUROLOGY | Admitting: PSYCHIATRY & NEUROLOGY
Payer: MEDICAID

## 2023-07-09 ENCOUNTER — APPOINTMENT (OUTPATIENT)
Dept: GENERAL RADIOLOGY | Age: 23
End: 2023-07-09
Payer: MEDICAID

## 2023-07-09 VITALS
HEART RATE: 84 BPM | TEMPERATURE: 98.3 F | DIASTOLIC BLOOD PRESSURE: 94 MMHG | SYSTOLIC BLOOD PRESSURE: 143 MMHG | RESPIRATION RATE: 16 BRPM | WEIGHT: 276.4 LBS | BODY MASS INDEX: 37.44 KG/M2 | HEIGHT: 72 IN | OXYGEN SATURATION: 98 %

## 2023-07-09 PROBLEM — F32.A DEPRESSION, UNSPECIFIED: Status: ACTIVE | Noted: 2023-07-09

## 2023-07-09 LAB
AMPHETAMINES UR QL SCN>1000 NG/ML: NORMAL
ANION GAP SERPL CALCULATED.3IONS-SCNC: 11 MMOL/L (ref 3–16)
APAP SERPL-MCNC: <5 UG/ML (ref 10–30)
BARBITURATES UR QL SCN>200 NG/ML: NORMAL
BASOPHILS # BLD: 0 K/UL (ref 0–0.2)
BASOPHILS NFR BLD: 0.3 %
BENZODIAZ UR QL SCN>200 NG/ML: NORMAL
BUN SERPL-MCNC: 12 MG/DL (ref 7–20)
CALCIUM SERPL-MCNC: 9.6 MG/DL (ref 8.3–10.6)
CANNABINOIDS UR QL SCN>50 NG/ML: NORMAL
CHLORIDE SERPL-SCNC: 105 MMOL/L (ref 99–110)
CO2 SERPL-SCNC: 27 MMOL/L (ref 21–32)
COCAINE UR QL SCN: NORMAL
CREAT SERPL-MCNC: 1 MG/DL (ref 0.9–1.3)
DEPRECATED RDW RBC AUTO: 13.5 % (ref 12.4–15.4)
DRUG SCREEN COMMENT UR-IMP: NORMAL
EKG ATRIAL RATE: 82 BPM
EKG DIAGNOSIS: NORMAL
EKG P AXIS: 42 DEGREES
EKG P-R INTERVAL: 154 MS
EKG Q-T INTERVAL: 380 MS
EKG QRS DURATION: 84 MS
EKG QTC CALCULATION (BAZETT): 443 MS
EKG R AXIS: 33 DEGREES
EKG T AXIS: 20 DEGREES
EKG VENTRICULAR RATE: 82 BPM
EOSINOPHIL # BLD: 0.2 K/UL (ref 0–0.6)
EOSINOPHIL NFR BLD: 2.4 %
ETHANOLAMINE SERPL-MCNC: NORMAL MG/DL (ref 0–0.08)
FENTANYL SCREEN, URINE: NORMAL
GFR SERPLBLD CREATININE-BSD FMLA CKD-EPI: >60 ML/MIN/{1.73_M2}
GLUCOSE SERPL-MCNC: 105 MG/DL (ref 70–99)
HCT VFR BLD AUTO: 41.6 % (ref 40.5–52.5)
HGB BLD-MCNC: 14.2 G/DL (ref 13.5–17.5)
LYMPHOCYTES # BLD: 2.9 K/UL (ref 1–5.1)
LYMPHOCYTES NFR BLD: 31.9 %
MCH RBC QN AUTO: 27.9 PG (ref 26–34)
MCHC RBC AUTO-ENTMCNC: 34.2 G/DL (ref 31–36)
MCV RBC AUTO: 81.4 FL (ref 80–100)
METHADONE UR QL SCN>300 NG/ML: NORMAL
MONOCYTES # BLD: 0.6 K/UL (ref 0–1.3)
MONOCYTES NFR BLD: 6.4 %
NEUTROPHILS # BLD: 5.3 K/UL (ref 1.7–7.7)
NEUTROPHILS NFR BLD: 59 %
OPIATES UR QL SCN>300 NG/ML: NORMAL
OXYCODONE UR QL SCN: NORMAL
PCP UR QL SCN>25 NG/ML: NORMAL
PH UR STRIP: 7 [PH]
PLATELET # BLD AUTO: 285 K/UL (ref 135–450)
PMV BLD AUTO: 7.5 FL (ref 5–10.5)
POTASSIUM SERPL-SCNC: 4.1 MMOL/L (ref 3.5–5.1)
RBC # BLD AUTO: 5.11 M/UL (ref 4.2–5.9)
SALICYLATES SERPL-MCNC: <0.3 MG/DL (ref 15–30)
SODIUM SERPL-SCNC: 143 MMOL/L (ref 136–145)
WBC # BLD AUTO: 9 K/UL (ref 4–11)

## 2023-07-09 PROCEDURE — 6370000000 HC RX 637 (ALT 250 FOR IP): Performed by: PSYCHIATRY & NEUROLOGY

## 2023-07-09 PROCEDURE — 85025 COMPLETE CBC W/AUTO DIFF WBC: CPT

## 2023-07-09 PROCEDURE — 82077 ASSAY SPEC XCP UR&BREATH IA: CPT

## 2023-07-09 PROCEDURE — 1240000000 HC EMOTIONAL WELLNESS R&B

## 2023-07-09 PROCEDURE — 80179 DRUG ASSAY SALICYLATE: CPT

## 2023-07-09 PROCEDURE — 71046 X-RAY EXAM CHEST 2 VIEWS: CPT

## 2023-07-09 PROCEDURE — 80143 DRUG ASSAY ACETAMINOPHEN: CPT

## 2023-07-09 PROCEDURE — 80048 BASIC METABOLIC PNL TOTAL CA: CPT

## 2023-07-09 PROCEDURE — 90792 PSYCH DIAG EVAL W/MED SRVCS: CPT

## 2023-07-09 PROCEDURE — 80307 DRUG TEST PRSMV CHEM ANLYZR: CPT

## 2023-07-09 PROCEDURE — 93005 ELECTROCARDIOGRAM TRACING: CPT | Performed by: EMERGENCY MEDICINE

## 2023-07-09 RX ORDER — ACETAMINOPHEN 325 MG/1
650 TABLET ORAL EVERY 8 HOURS PRN
Status: DISCONTINUED | OUTPATIENT
Start: 2023-07-09 | End: 2023-07-13 | Stop reason: HOSPADM

## 2023-07-09 RX ORDER — OLANZAPINE 5 MG/1
5 TABLET ORAL 2 TIMES DAILY PRN
Status: DISCONTINUED | OUTPATIENT
Start: 2023-07-09 | End: 2023-07-12

## 2023-07-09 RX ORDER — HYDROXYZINE 50 MG/1
50 TABLET, FILM COATED ORAL 3 TIMES DAILY PRN
Status: DISCONTINUED | OUTPATIENT
Start: 2023-07-09 | End: 2023-07-13 | Stop reason: HOSPADM

## 2023-07-09 RX ORDER — TRAZODONE HYDROCHLORIDE 50 MG/1
50 TABLET ORAL NIGHTLY PRN
Status: DISCONTINUED | OUTPATIENT
Start: 2023-07-09 | End: 2023-07-13 | Stop reason: HOSPADM

## 2023-07-09 RX ORDER — POLYETHYLENE GLYCOL 3350 17 G
2 POWDER IN PACKET (EA) ORAL
Status: DISCONTINUED | OUTPATIENT
Start: 2023-07-09 | End: 2023-07-13 | Stop reason: HOSPADM

## 2023-07-09 RX ADMIN — HYDROXYZINE HYDROCHLORIDE 50 MG: 50 TABLET, FILM COATED ORAL at 20:31

## 2023-07-09 ASSESSMENT — LIFESTYLE VARIABLES
HOW OFTEN DO YOU HAVE A DRINK CONTAINING ALCOHOL: 2-4 TIMES A MONTH
HOW MANY STANDARD DRINKS CONTAINING ALCOHOL DO YOU HAVE ON A TYPICAL DAY: 3 OR 4
HOW OFTEN DO YOU HAVE A DRINK CONTAINING ALCOHOL: MONTHLY OR LESS
HOW MANY STANDARD DRINKS CONTAINING ALCOHOL DO YOU HAVE ON A TYPICAL DAY: 7 TO 9

## 2023-07-09 ASSESSMENT — SLEEP AND FATIGUE QUESTIONNAIRES
DO YOU USE A SLEEP AID: NO
AVERAGE NUMBER OF SLEEP HOURS: 8
DO YOU HAVE DIFFICULTY SLEEPING: NO

## 2023-07-09 ASSESSMENT — PAIN - FUNCTIONAL ASSESSMENT: PAIN_FUNCTIONAL_ASSESSMENT: 0-10

## 2023-07-09 ASSESSMENT — PAIN SCALES - GENERAL
PAINLEVEL_OUTOF10: 0
PAINLEVEL_OUTOF10: 0

## 2023-07-10 PROBLEM — F60.2 PERSONALITY DISORDER WITH PREDOMINANTLY SOCIOPATHIC OR ASOCIAL MANIFESTATION (HCC): Status: ACTIVE | Noted: 2023-07-10

## 2023-07-10 PROBLEM — Z72.0 TOBACCO ABUSE: Status: ACTIVE | Noted: 2023-07-10

## 2023-07-10 PROBLEM — E66.9 OBESITY: Status: ACTIVE | Noted: 2022-06-24

## 2023-07-10 PROBLEM — K21.9 GASTROESOPHAGEAL REFLUX DISEASE: Status: ACTIVE | Noted: 2022-06-24

## 2023-07-10 PROBLEM — F94.1 REACTIVE ATTACHMENT DISORDER: Status: ACTIVE | Noted: 2023-07-10

## 2023-07-10 PROBLEM — Z00.00 ENCOUNTER FOR GENERAL MEDICAL EXAMINATION: Status: ACTIVE | Noted: 2023-07-10

## 2023-07-10 PROBLEM — F15.90 AMPHETAMINE MISUSE: Status: ACTIVE | Noted: 2022-06-24

## 2023-07-10 PROCEDURE — 6370000000 HC RX 637 (ALT 250 FOR IP): Performed by: PSYCHIATRY & NEUROLOGY

## 2023-07-10 PROCEDURE — 99221 1ST HOSP IP/OBS SF/LOW 40: CPT

## 2023-07-10 PROCEDURE — 6370000000 HC RX 637 (ALT 250 FOR IP)

## 2023-07-10 PROCEDURE — 1240000000 HC EMOTIONAL WELLNESS R&B

## 2023-07-10 RX ORDER — DIVALPROEX SODIUM 500 MG/1
500 TABLET, DELAYED RELEASE ORAL 3 TIMES DAILY
Status: DISCONTINUED | OUTPATIENT
Start: 2023-07-10 | End: 2023-07-13 | Stop reason: HOSPADM

## 2023-07-10 RX ORDER — ALBUTEROL SULFATE 90 UG/1
2 AEROSOL, METERED RESPIRATORY (INHALATION) EVERY 6 HOURS PRN
Status: DISCONTINUED | OUTPATIENT
Start: 2023-07-10 | End: 2023-07-13 | Stop reason: HOSPADM

## 2023-07-10 RX ORDER — TRAZODONE HYDROCHLORIDE 50 MG/1
50 TABLET ORAL NIGHTLY
Status: DISCONTINUED | OUTPATIENT
Start: 2023-07-10 | End: 2023-07-13 | Stop reason: HOSPADM

## 2023-07-10 RX ORDER — PANTOPRAZOLE SODIUM 40 MG/1
40 TABLET, DELAYED RELEASE ORAL
Status: DISCONTINUED | OUTPATIENT
Start: 2023-07-11 | End: 2023-07-13 | Stop reason: HOSPADM

## 2023-07-10 RX ADMIN — DIVALPROEX SODIUM 500 MG: 500 TABLET, DELAYED RELEASE ORAL at 21:25

## 2023-07-10 RX ADMIN — HYDROXYZINE HYDROCHLORIDE 50 MG: 50 TABLET, FILM COATED ORAL at 21:58

## 2023-07-10 RX ADMIN — OLANZAPINE 5 MG: 5 TABLET, FILM COATED ORAL at 21:58

## 2023-07-10 RX ADMIN — TRAZODONE HYDROCHLORIDE 50 MG: 50 TABLET ORAL at 21:58

## 2023-07-10 RX ADMIN — DIVALPROEX SODIUM 500 MG: 500 TABLET, DELAYED RELEASE ORAL at 17:06

## 2023-07-10 ASSESSMENT — PAIN SCALES - GENERAL: PAINLEVEL_OUTOF10: 0

## 2023-07-10 ASSESSMENT — SLEEP AND FATIGUE QUESTIONNAIRES
SLEEP PATTERN: DIFFICULTY FALLING ASLEEP;DISTURBED/INTERRUPTED SLEEP;RESTLESSNESS;NIGHTMARES/TERRORS
DO YOU HAVE DIFFICULTY SLEEPING: YES
DO YOU USE A SLEEP AID: YES
AVERAGE NUMBER OF SLEEP HOURS: 2

## 2023-07-10 ASSESSMENT — PATIENT HEALTH QUESTIONNAIRE - PHQ9: SUM OF ALL RESPONSES TO PHQ QUESTIONS 1-9: 19

## 2023-07-10 NOTE — GROUP NOTE
Group Therapy Note    Date: 7/10/2023    Group Start Time: 1100  Group End Time: 5893  Group Topic: Psychoeducation    [facility]    ELIU Casanova        Group Therapy Note  Clinician introduce the group members to the 6 areas of self care. Members took the self-assessment for the 6 areas of care and discussed their individual finding with the other group members. Attendees: 7       Patient's Goal:      Notes:  Patient was cooperative and fully engaged in the group discussion and activity. Patient participated in taking the self-assessment and spoke about the results in the group discussion. Status After Intervention:  Improved    Participation Level:  Active Listener and Interactive    Participation Quality: Appropriate, Attentive, Sharing, and Supportive      Speech:  normal      Thought Process/Content: Linear      Affective Functioning: Blunted      Mood: depressed      Level of consciousness:  Drowsy      Response to Learning: Able to retain information      Endings: None Reported    Modes of Intervention: Education, Support, and Activity      Discipline Responsible: /Counselor      Signature:  ELIU Casanova

## 2023-07-10 NOTE — PROGRESS NOTES
Group Therapy Note    Date: 7/9/2023  Start Time: 20:00  End Time:  21:00  Number of Participants: 12    Type of Group: Recreational  wrap up    Wellness Binder Information  Module Name:  /  Session Number:  /    Patient's Goal:  coping skills    Notes:  continuing to work on goal    Status After Intervention:  Unchanged    Participation Level:  Active Listener and Interactive    Participation Quality: Appropriate, Attentive, and Sharing      Speech:  pressured      Thought Process/Content: Flight of ideas      Affective Functioning: Exaggerated      Mood: anxious      Level of consciousness:  Alert and Attentive      Response to Learning: Able to change behavior      Endings: None Reported    Modes of Intervention: Socialization and Problem-solving      Discipline Responsible: [address]      Signature:  Lewayne Brunner

## 2023-07-10 NOTE — H&P
[address]                 [address] [place] [address], [address]                              HISTORY AND PHYSICAL    PATIENT NAME: Rain Mahajan                         :        2000  MED REC NO:   [id number]                          ROOM:       8337  ACCOUNT NO:   [de-identified]                           ADMIT DATE: 2023  PROVIDER:     Johnna Feliz. Conrad Robins MD    PSYCHIATRY HISTORY AND PHYSICAL    DATE OF EVALUATION:  07/10/2023    CHIEF COMPLAINT:  Suicidality. HISTORY OF PRESENT ILLNESS:  The patient is a 75-year-old male who  [facility] with suicidal  ideation. He was seen by [facility]. He reports that he  was having suicidal ideation but no active plan. He did make some vague  mention of cutting his throat. Apparently, he states that he attempted  suicide 3 weeks ago by overdosing on heroin and fentanyl. He states he  has been depressed for many years. He described how he was placed in  foster care and group homes throughout most of his life. Apparently, he  stated he has been on lithium and trazodone for the past 7 months, but  has been off meds for the past 3 weeks. He describes poor appetite and  sleep with feelings of hopelessness. He is currently homeless, living  in [facility]. He had been living in [place] prior to this and  was in [facility] prior to that. He does have a history of  methamphetamine abuse, marijuana, heroin, and fentanyl use as well. Urine drug screen was negative. PRIOR PSYCHIATRIC HISTORY:  He states he was in [facility] [place]  3 weeks ago. SOCIAL SITUATION:  He is currently homeless. He has been couch surfing  and is living with a variety of different people. He states he has been  moving about the country. He states he grew up in foster care as well  as group homes.   He states his father was in the [employer] and was not  allowed
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
## 2023-07-10 NOTE — PROGRESS NOTES
Pt has been visible on the unit and went to evening group. Pt has been hyperactive and unable to concentrate this shift. Pt is very hyper verbal and states he feels anxious all the time. Pt has been friendly and cooperative. Pt denies SI/HI/AVH. PRN atarax given at 2031 with effectiveness. Pt states he has no pharmacy and will need meds to bed services.

## 2023-07-10 NOTE — PROGRESS NOTES
Pt admitted to smoking delta 10. This writer attempted to educate patient on the dangers of synthetics, pt declined education. Will continue to monitor.

## 2023-07-10 NOTE — PLAN OF CARE
Problem: Anxiety  Goal: Will report anxiety at manageable levels  Description: INTERVENTIONS:  1. Administer medication as ordered  2. Teach and rehearse alternative coping skills  3. Provide emotional support with 1:1 interaction with staff  Outcome: Progressing  Flowsheets (Taken 7/10/2023 0900)  Will report anxiety at manageable levels:   Administer medication as ordered   Provide emotional support with 1:1 interaction with staff   Teach and rehearse alternative coping skills     Problem: Coping  Goal: Pt/Family able to verbalize concerns and demonstrate effective coping strategies  Description: INTERVENTIONS:  1. Assist patient/family to identify coping skills, available support systems and cultural and spiritual values  2. Provide emotional support, including active listening and acknowledgement of concerns of patient and caregivers  3. Reduce environmental stimuli, as able  4. Instruct patient/family in relaxation techniques, as appropriate  5. Assess for spiritual pain/suffering and initiate Spiritual Care, Psychosocial Clinical Specialist consults as needed  Outcome: Progressing     Problem: Behavior  Goal: Pt/Family maintain appropriate behavior and adhere to behavioral management agreement, if implemented  Description: INTERVENTIONS:  1. Assess patient/family's coping skills and  non-compliant behavior (including use of illegal substances)  2. Notify security of behavior or suspected illegal substances which indicate the need for search of the family and/or belongings  3. Encourage verbalization of thoughts and concerns in a socially appropriate manner  4. Utilize positive, consistent limit setting strategies supporting safety of patient, staff and others  5. Encourage participation in the decision making process about the behavioral management agreement  6. If a visitor's behavior poses a threat to safety call refer to organization policy.   7. Initiate consult with , Psychosocial CNS,

## 2023-07-10 NOTE — PROGRESS NOTES
Group Therapy Note    Date: 7/10/2023  Start Time: 1300  End Time:  1400  Number of Participants: 9    Type of Group: Music Group    Notes:  Pt present for Music Group. Pt actively participated by making song selections and singing along to music. Participation Level:  Active Listener and Interactive    Participation Quality: Appropriate and Attentive      Speech:  normal      Affective Functioning: Congruent      Endings: None Reported    Modes of Intervention: Support, Socialization, Activity, and Media      Discipline Responsible: Chaplain Thierry Block       07/10/23 1433   Encounter Summary   Encounter Overview/Reason  Behavioral Health   Service Provided For: Patient   Last Encounter    (7/10 Music Group)   Complexity of Encounter Moderate   Begin Time 1300   End Time  1400   Total Time Calculated 60 min   Behavioral Health    Type  Spirituality Group

## 2023-07-10 NOTE — PROGRESS NOTES
Behavioral Services  Medicare Certification Upon Admission    I certify that this patient's inpatient psychiatric hospital admission is medically necessary for:    [x] (1) Treatment which could reasonably be expected to improve this patient's condition,       [x] (2) Or for diagnostic study;     AND     [x](2) The inpatient psychiatric services are provided while the individual is under the care of a physician and are included in the individualized plan of care.     Estimated length of stay/service 5 d    Plan for post-hospital care outpt    Electronically signed by Yamileth Chawla MD on 7/10/2023 at 3:35 PM

## 2023-07-10 NOTE — H&P
Hospital Medicine History & Physical      PCP: Pcp No    Date of Admission: 7/9/2023    Date of Service: Pt seen/examined on 07/10/23       Chief Complaint:  No chief complaint on file. History Of Present Illness: The patient is a 21 y.o. male with PMHx of depression, methamphetamine abuse, obesity, asthma, TIN and GERD who presented to [facility] for SI. Patient was seen and evaluated in the ED by the ED medical provider, patient was medically cleared for admission to [facility]. This note serves as an admission medical H&P. Patient is seen in room. States he has gotten very little sleep and is tired. Otherwise, denies any medical complaints at this time. Past Medical History:        Diagnosis Date    Asthma     Back pain     Depression     Drug abuse ([address])     meth use - smokes last used 07/08/2023 1400    Overweight        Past Surgical History:        Procedure Laterality Date    LEG SURGERY Right     Abraham put in leg       Medications Prior to Admission:    Prior to Admission medications    Medication Sig Start Date End Date Taking? Authorizing Provider   Divalproex Sodium (DEPAKOTE PO) Take by mouth 3 times daily ? Mg 3 x a day per pt unknown if extended release or not    Historical Provider, MD   LITHIUM PO Take by mouth \"Unknown dose, 2 x a day\" last took 2 1/2 weeks ago    Historical Provider, MD   TRAZODONE HCL PO Take 50 mg by mouth at bedtime    Historical Provider, MD   methocarbamol (ROBAXIN) 500 MG tablet Take 1 tablet by mouth 4 times daily    Historical Provider, MD       Allergies:  Fish-derived products, Nuts [peanut-containing drug products], Capsicum annuum extract & derivative (bell pepper) [capsicum], Onion, and Tomato    Social History:    Tobacco use: chewing tobacco  ETOH use: denies  Illicit drug use: former meth and heroin abuse    Family History:   Positive as follows:    History reviewed. No pertinent family history.     REVIEW OF SYSTEMS:       Constitutional: Negative for

## 2023-07-11 PROCEDURE — 6370000000 HC RX 637 (ALT 250 FOR IP): Performed by: PSYCHIATRY & NEUROLOGY

## 2023-07-11 PROCEDURE — 1240000000 HC EMOTIONAL WELLNESS R&B

## 2023-07-11 PROCEDURE — 6370000000 HC RX 637 (ALT 250 FOR IP)

## 2023-07-11 RX ORDER — METHOCARBAMOL 500 MG/1
500 TABLET, FILM COATED ORAL 3 TIMES DAILY PRN
Status: DISCONTINUED | OUTPATIENT
Start: 2023-07-11 | End: 2023-07-13 | Stop reason: HOSPADM

## 2023-07-11 RX ADMIN — DIVALPROEX SODIUM 500 MG: 500 TABLET, DELAYED RELEASE ORAL at 21:33

## 2023-07-11 RX ADMIN — PANTOPRAZOLE SODIUM 40 MG: 40 TABLET, DELAYED RELEASE ORAL at 05:55

## 2023-07-11 RX ADMIN — TRAZODONE HYDROCHLORIDE 50 MG: 50 TABLET ORAL at 21:30

## 2023-07-11 RX ADMIN — DIVALPROEX SODIUM 500 MG: 500 TABLET, DELAYED RELEASE ORAL at 09:00

## 2023-07-11 RX ADMIN — METHOCARBAMOL 500 MG: 500 TABLET ORAL at 21:35

## 2023-07-11 RX ADMIN — DIVALPROEX SODIUM 500 MG: 500 TABLET, DELAYED RELEASE ORAL at 14:42

## 2023-07-11 NOTE — GROUP NOTE
Group Therapy Note    Date: 7/11/2023    Group Start Time: 1100  Group End Time: 0250  Group Topic: Cognitive Skills    [address], [place]        Group Therapy Note    Attendees: 7    Therapist facilitated a game of Medisasa for group members to compete for activity enjoyment. Outcomes included working on cognitive skills (such as memory, alertness/sharpness, problem-solving), socialization/communication skills, reducing stress/anxiety, and improving mood. Notes:  Patient actively listened during group introduction, was vocal (monopolizing) in beginning, and fully participated in activity. Patient discussed thoughts with peers and wrote answers for his team.  Patient was present and engaged for duration. Status After Intervention:  Improved    Participation Level:  Active Listener, Interactive, and Monopolizing    Participation Quality: Attentive and Sharing      Speech:  pressured and loud      Thought Process/Content: Logical  Linear      Affective Functioning: Incongruent      Mood: anxious and euthymic      Level of consciousness:  Alert and Attentive      Response to Learning: Able to verbalize current knowledge/experience, Able to verbalize/acknowledge new learning, Able to retain information, Capable of insight, and Progressing to goal      Endings: None Reported    Modes of Intervention: Education, Support, Socialization, Problem-solving, and Activity      Discipline Responsible: Psychoeducational Specialist and Recreational Therapist      Signature:  Konrad Jarrell MA, Becca Looney

## 2023-07-11 NOTE — BH NOTE
Patient states, \"I just found out an hour ago my father was killed fighting in [place]. \" Patient rates anxiety 10/10. Patient medicated with Hydroxyzine 50 mg po and Zyprexa 5 mg po.

## 2023-07-11 NOTE — PLAN OF CARE
Patient alert and oriented x 2 disoriented to date. Patient visible and social with peers. Patient denies SI/HI/A/V/H. Patient watching T.V. on the big side. Patient denies self harm. No c/o's voiced @ present.

## 2023-07-11 NOTE — GROUP NOTE
Group Therapy Note    Date: 7/10/2023    Group Start Time: 2030  Group End Time: 2100  Group Topic: [id number], RN        Group Therapy Note    Attendees: 9       Patient's Goal:  to sleep. States he's been having a hard time sleeping. Notes:  Stated he was going to try to go to groups . He said he made it to 2 groups today.      Status After Intervention:  Unchanged    Participation Level: Interactive    Participation Quality: Attentive, Sharing, and Supportive      Speech:  pressured and and loud at times      Thought Process/Content: Logical  Linear      Affective Functioning: Congruent      Mood: euthymic      Level of consciousness:  Alert and Oriented x4      Response to Learning: Able to verbalize current knowledge/experience, Able to verbalize/acknowledge new learning, and Able to retain information      Endings: None Reported    Modes of Intervention: Education, Support, Socialization, and Exploration      Discipline Responsible: Registered Nurse      Signature:  Minh Wheeler RN

## 2023-07-11 NOTE — PROGRESS NOTES
Pt is withdrawn to room this morning. He is flat and short with his answers. Pt denies current SI/HI/VH/AH and agrees to communicate with staff if no longer feel safe or in control. Denies anxiety but states his dad just passed away last night so he just needs time processing. Discussed coping skills and encouraged him to write down his feeling and/or memories he has with his dad because he was constricted when discussing with me. +meds. Pt states he just wanted to lay down and declined group this morning.  Reports not falling asleep unti

## 2023-07-12 PROCEDURE — 6370000000 HC RX 637 (ALT 250 FOR IP)

## 2023-07-12 PROCEDURE — 1240000000 HC EMOTIONAL WELLNESS R&B

## 2023-07-12 PROCEDURE — 6370000000 HC RX 637 (ALT 250 FOR IP): Performed by: PSYCHIATRY & NEUROLOGY

## 2023-07-12 RX ORDER — HALOPERIDOL 10 MG/1
10 TABLET ORAL EVERY 6 HOURS PRN
Status: DISCONTINUED | OUTPATIENT
Start: 2023-07-12 | End: 2023-07-13 | Stop reason: HOSPADM

## 2023-07-12 RX ORDER — DIPHENHYDRAMINE HYDROCHLORIDE 50 MG/ML
25 INJECTION INTRAMUSCULAR; INTRAVENOUS EVERY 6 HOURS PRN
Status: DISCONTINUED | OUTPATIENT
Start: 2023-07-12 | End: 2023-07-12

## 2023-07-12 RX ORDER — LORAZEPAM 2 MG/ML
2 INJECTION INTRAMUSCULAR EVERY 6 HOURS PRN
Status: DISCONTINUED | OUTPATIENT
Start: 2023-07-12 | End: 2023-07-12

## 2023-07-12 RX ORDER — HALOPERIDOL 5 MG/ML
10 INJECTION INTRAMUSCULAR EVERY 6 HOURS PRN
Status: DISCONTINUED | OUTPATIENT
Start: 2023-07-12 | End: 2023-07-13 | Stop reason: HOSPADM

## 2023-07-12 RX ORDER — LORAZEPAM 2 MG/1
2 TABLET ORAL
Status: DISCONTINUED | OUTPATIENT
Start: 2023-07-12 | End: 2023-07-13 | Stop reason: HOSPADM

## 2023-07-12 RX ORDER — HALOPERIDOL 5 MG/ML
10 INJECTION INTRAMUSCULAR EVERY 6 HOURS PRN
Status: DISCONTINUED | OUTPATIENT
Start: 2023-07-12 | End: 2023-07-12

## 2023-07-12 RX ORDER — DIPHENHYDRAMINE HYDROCHLORIDE 50 MG/ML
50 INJECTION INTRAMUSCULAR; INTRAVENOUS EVERY 6 HOURS PRN
Status: DISCONTINUED | OUTPATIENT
Start: 2023-07-12 | End: 2023-07-13 | Stop reason: HOSPADM

## 2023-07-12 RX ORDER — DIPHENHYDRAMINE HCL 25 MG
50 TABLET ORAL EVERY 6 HOURS PRN
Status: DISCONTINUED | OUTPATIENT
Start: 2023-07-12 | End: 2023-07-13 | Stop reason: HOSPADM

## 2023-07-12 RX ORDER — LORAZEPAM 2 MG/ML
2 INJECTION INTRAMUSCULAR EVERY 6 HOURS PRN
Status: DISCONTINUED | OUTPATIENT
Start: 2023-07-12 | End: 2023-07-13 | Stop reason: HOSPADM

## 2023-07-12 RX ADMIN — DIVALPROEX SODIUM 500 MG: 500 TABLET, DELAYED RELEASE ORAL at 08:37

## 2023-07-12 RX ADMIN — TRAZODONE HYDROCHLORIDE 50 MG: 50 TABLET ORAL at 21:19

## 2023-07-12 RX ADMIN — DIPHENHYDRAMINE HCL 50 MG: 25 TABLET ORAL at 23:43

## 2023-07-12 RX ADMIN — PANTOPRAZOLE SODIUM 40 MG: 40 TABLET, DELAYED RELEASE ORAL at 07:02

## 2023-07-12 RX ADMIN — DIVALPROEX SODIUM 500 MG: 500 TABLET, DELAYED RELEASE ORAL at 21:19

## 2023-07-12 RX ADMIN — DIVALPROEX SODIUM 500 MG: 500 TABLET, DELAYED RELEASE ORAL at 14:52

## 2023-07-12 ASSESSMENT — PAIN SCALES - GENERAL: PAINLEVEL_OUTOF10: 0

## 2023-07-12 NOTE — CARE COORDINATION
[address] [facility]  Treatment Team Note  Review Date & Time: 7/12/2023  10:25 AM    Patient was not in treatment team      Status EXAM:   Mental Status and Behavioral Exam  Normal: No  Level of Assistance: Independent/Self  Facial Expression: Sad  Affect: Blunt  Level of Consciousness: Alert  Frequency of Checks: 4 times per hour, intensive (bathroom door locked)  Mood:Normal: No  Mood: Anxious  Motor Activity:Normal: Yes  Motor Activity: Decreased  Eye Contact: Good  Observed Behavior: Preoccupied, Friendly, Cooperative  Sexual Misconduct History: Current - no  Preception: [place] to person, [place] to time, [place] to place, [place] to situation  Attention:Normal: No  Attention: Distractible  Thought Processes: Perseveration  Thought Content:Normal: No  Thought Content: Poverty of content  Depression Symptoms: Change in energy level, Sleep disturbance  Anxiety Symptoms: No problems reported or observed. Jacque Symptoms: No problems reported or observed. Hallucinations: None  Delusions: No  Memory:Normal: No  Memory: Poor recent  Insight and Judgment: No  Insight and Judgment: Poor insight, Poor judgment      Suicide Risk CSSR-S:  1) Within the past month, have you wished you were dead or wished you could go to sleep and not wake up? : Yes  2) Have you actually had any thoughts of killing yourself? : No  3) Have you been thinking about how you might kill yourself? : No  5) Have you started to work out or worked out the details of how to kill yourself? Do you intend to carry out this plan? : No  6) Have you ever done anything, started to do anything, or prepared to do anything to end your life?: No      PLAN/TREATMENT RECOMMENDATIONS UPDATE: Patient will take medication as prescribed, eat 75% of meals, attend groups, participate in milieu activities, participate in treatment team and care planning for discharge and follow up.             Nae Sesay RN

## 2023-07-12 NOTE — PLAN OF CARE
Problem: Anxiety  Goal: Will report anxiety at manageable levels  Description: INTERVENTIONS:  1. Administer medication as ordered  2. Teach and rehearse alternative coping skills  3. Provide emotional support with 1:1 interaction with staff  2023 0532 by Abby Williamson RN  Outcome: Progressing     Problem: Coping  Goal: Pt/Family able to verbalize concerns and demonstrate effective coping strategies  Description: INTERVENTIONS:  1. Assist patient/family to identify coping skills, available support systems and cultural and spiritual values  2. Provide emotional support, including active listening and acknowledgement of concerns of patient and caregivers  3. Reduce environmental stimuli, as able  4. Instruct patient/family in relaxation techniques, as appropriate  5. Assess for spiritual pain/suffering and initiate Spiritual Care, Psychosocial Clinical Specialist consults as needed  Outcome: Not Progressing     Problem: Depression  Goal: Will be euthymic at discharge  Description: INTERVENTIONS:  1. Administer medication as ordered  2. Provide emotional support via 1:1 interaction with staff  3. Encourage involvement in milieu/groups/activities  4. Monitor for social isolation  Outcome: Not Progressing   Pt attended group tonight and talked a little more about his dad and his dad's upcoming  this . Pt not talking a great deal about his dad. Seems to stop talking about it. Feels like if he left to go to the  that he might not do well. Discussed how his dad would want him to do what's better for him at this time. Pt cooperative. Often superficial.  Pt needs work with coping strategies. Pt up a few times in the night but was able to go back to sleep. pt did have Trazodone 50 mg for sleep [id number] and Robaxin 500 mg at 2135 for pain. Pt was able to fall asleep and had no further c/o pain.

## 2023-07-12 NOTE — GROUP NOTE
Unit milieu not appropriate for group therapy this afternoon, no attendance at time of multiple attempts to initiate 13:00 group therapy session. Writer provided this pt with information packet on mindfulness training, grounding skills, and physical/mental/soothing techniques.

## 2023-07-12 NOTE — PLAN OF CARE
Problem: Anxiety  Goal: Will report anxiety at manageable levels  Description: INTERVENTIONS:  1. Administer medication as ordered  2. Teach and rehearse alternative coping skills  3. Provide emotional support with 1:1 interaction with staff  7/12/2023 1448 by Mira Sandhu RN  Outcome: Progressing     Problem: Coping  Goal: Pt/Family able to verbalize concerns and demonstrate effective coping strategies  Description: INTERVENTIONS:  1. Assist patient/family to identify coping skills, available support systems and cultural and spiritual values  2. Provide emotional support, including active listening and acknowledgement of concerns of patient and caregivers  3. Reduce environmental stimuli, as able  4. Instruct patient/family in relaxation techniques, as appropriate  5. Assess for spiritual pain/suffering and initiate Spiritual Care, Psychosocial Clinical Specialist consults as needed  7/12/2023 1448 by Mira Sandhu RN  Outcome: Progressing     Problem: Behavior  Goal: Pt/Family maintain appropriate behavior and adhere to behavioral management agreement, if implemented  Description: INTERVENTIONS:  1. Assess patient/family's coping skills and  non-compliant behavior (including use of illegal substances)  2. Notify security of behavior or suspected illegal substances which indicate the need for search of the family and/or belongings  3. Encourage verbalization of thoughts and concerns in a socially appropriate manner  4. Utilize positive, consistent limit setting strategies supporting safety of patient, staff and others  5. Encourage participation in the decision making process about the behavioral management agreement  6. If a visitor's behavior poses a threat to safety call refer to organization policy.   7. Initiate consult with , Psychosocial CNS, Spiritual Care as appropriate  Outcome: Progressing     Problem: Depression  Goal: Will be euthymic at discharge  Description:

## 2023-07-12 NOTE — PROGRESS NOTES
07/12/23 1402   Encounter Summary   Encounter Overview/Reason  Initial Encounter   Service Provided For: Patient   Referral/Consult From: Namita   Last Encounter  07/12/23  (Visited and had prayer)

## 2023-07-12 NOTE — GROUP NOTE
Group Therapy Note    Date: 7/12/2023    Group Start Time: 1100  Group End Time: 0785  Group Topic: Psychoeducation    [facility]    ELIU Alejandra        Group Therapy Note  Clinician invited everyone on the unit to the group. None of the group members attended. The clinician made packets of the CBT triangle, CBT for anxiety and Challenging anxious thoughts handouts for the patients to work on independently. Attendees: 4       Patient's Goal:      Notes:  Patient did not come to the group. The clinician provided the patient with a handout of worksheet and the patient took the packet and started working on it. Status After Intervention:  Improved    Participation Level:  Active Listener    Participation Quality: Attentive      Speech:  normal      Thought Process/Content: Linear      Affective Functioning: Congruent      Mood: anxious      Level of consciousness:  Attentive      Response to Learning: Able to retain information      Endings: None Reported    Modes of Intervention: Education and Support      Discipline Responsible: /Counselor      Signature:  ELIU Alejandra

## 2023-07-12 NOTE — GROUP NOTE
Group Therapy Note    Date: 7/11/2023    Group Start Time: 2030  Group End Time: 2100  Group Topic: [id number] [address] [place] [address], RN        Group Therapy Note    Attendees: 10       Patient's Goal:  To get through this time of grief. Pt's father was shot and killed yesterday yesterday while serving in the armed [email or web address]. Notes:  Feels he is doing a little better today. States was upset earlier when trying to make phone calls. States he did have some SI last night, but has gotten it together. Pt states that he will remain safe. Status After Intervention:  Unchanged    Participation Level:  Active Listener and Interactive    Participation Quality: Appropriate, Attentive, and Sharing      Speech:  normal      Thought Process/Content: Logical  Linear      Affective Functioning: Congruent and Constricted/Restricted      Mood: anxious and depressed      Level of consciousness:  Alert, Oriented x4, and Attentive      Response to Learning: Able to verbalize current knowledge/experience, Able to verbalize/acknowledge new learning, and Able to retain information      Endings: None Reported    Modes of Intervention: Education, Support, and Socialization      Discipline Responsible: Registered Nurse      Signature:  Dipika Dawson RN

## 2023-07-13 VITALS
OXYGEN SATURATION: 97 % | WEIGHT: 276 LBS | SYSTOLIC BLOOD PRESSURE: 107 MMHG | RESPIRATION RATE: 20 BRPM | HEIGHT: 72 IN | BODY MASS INDEX: 37.38 KG/M2 | DIASTOLIC BLOOD PRESSURE: 71 MMHG | HEART RATE: 80 BPM | TEMPERATURE: 96.9 F

## 2023-07-13 PROCEDURE — 6370000000 HC RX 637 (ALT 250 FOR IP): Performed by: PSYCHIATRY & NEUROLOGY

## 2023-07-13 PROCEDURE — 6370000000 HC RX 637 (ALT 250 FOR IP)

## 2023-07-13 PROCEDURE — 5130000000 HC BRIDGE APPOINTMENT

## 2023-07-13 RX ORDER — DIVALPROEX SODIUM 500 MG/1
500 TABLET, DELAYED RELEASE ORAL 3 TIMES DAILY
Qty: 90 TABLET | Refills: 0 | Status: SHIPPED | OUTPATIENT
Start: 2023-07-13 | End: 2023-07-13 | Stop reason: SDUPTHER

## 2023-07-13 RX ORDER — METHOCARBAMOL 500 MG/1
500 TABLET, FILM COATED ORAL 3 TIMES DAILY PRN
Qty: 30 TABLET | Refills: 0 | Status: SHIPPED | OUTPATIENT
Start: 2023-07-13 | End: 2023-07-23

## 2023-07-13 RX ORDER — PANTOPRAZOLE SODIUM 40 MG/1
40 TABLET, DELAYED RELEASE ORAL
Qty: 30 TABLET | Refills: 0 | Status: SHIPPED | OUTPATIENT
Start: 2023-07-14 | End: 2023-07-13 | Stop reason: SDUPTHER

## 2023-07-13 RX ORDER — DIVALPROEX SODIUM 500 MG/1
500 TABLET, DELAYED RELEASE ORAL 3 TIMES DAILY
Qty: 90 TABLET | Refills: 0 | Status: SHIPPED | OUTPATIENT
Start: 2023-07-13

## 2023-07-13 RX ORDER — METHOCARBAMOL 500 MG/1
500 TABLET, FILM COATED ORAL 3 TIMES DAILY PRN
Qty: 30 TABLET | Refills: 0 | Status: SHIPPED | OUTPATIENT
Start: 2023-07-13 | End: 2023-07-13 | Stop reason: SDUPTHER

## 2023-07-13 RX ORDER — PANTOPRAZOLE SODIUM 40 MG/1
40 TABLET, DELAYED RELEASE ORAL
Qty: 30 TABLET | Refills: 0 | Status: SHIPPED | OUTPATIENT
Start: 2023-07-14

## 2023-07-13 RX ADMIN — PANTOPRAZOLE SODIUM 40 MG: 40 TABLET, DELAYED RELEASE ORAL at 06:26

## 2023-07-13 RX ADMIN — DIVALPROEX SODIUM 500 MG: 500 TABLET, DELAYED RELEASE ORAL at 09:12

## 2023-07-13 NOTE — BH NOTE
[facility]  Discharge Note    Pt discharged with followings belongings:  Dental Appliances: None  Vision - Corrective Lenses: None  Hearing Aid: None  Jewelry: None  Body Piercings Removed: N/A  Clothing: Footwear, Jacket/Coat, Pants, Shirt (bib overalls)  Other Valuables: Money, Other (Comment) (can of tobacco (chew/dip)in locker; 15$ and change placed in safe.)   Valuables returned to patient. Patient educated on aftercare instructions: yes  Information faxed to n/a by n/a  at 11:58 AM .Patient verbalize understanding of AVS: yes. Status EXAM upon discharge:  Mental Status and Behavioral Exam  Normal: No  Level of Assistance: Independent/Self  Facial Expression: Brightened, Elevated  Affect: Appropriate  Level of Consciousness: Alert  Frequency of Checks: 4 times per hour, close  Mood:Normal: No  Mood: Elated  Motor Activity:Normal: Yes  Motor Activity: Other (comment) (within normal range)  Eye Contact: Good  Observed Behavior: Cooperative, Friendly, Hypermobile  Sexual Misconduct History: Current - no  Preception: [place] to person, [place] to time, [place] to place, [place] to situation  Attention:Normal: Yes  Attention: Distractible  Thought Processes: Unremarkable  Thought Content:Normal: No  Thought Content: Poverty of content  Depression Symptoms: No problems reported or observed. Anxiety Symptoms: No problems reported or observed. Jacque Symptoms: No problems reported or observed.   Hallucinations: None  Delusions: No  Memory:Normal: Yes  Memory: Poor recent  Insight and Judgment: Yes  Insight and Judgment: Poor judgment, Poor insight    Tobacco Screening:  Practical Counseling, on admission, brijesh X, if applicable and completed (first 3 are required if patient doesn't refuse):            ( ) Recognizing danger situations (included triggers and roadblocks)                    ( ) Coping skills (new ways to manage stress,relaxation techniques, changing routine, distraction)

## 2023-07-13 NOTE — GROUP NOTE
Group Therapy Note    Date: 7/13/2023    Group Start Time: 1000  Group End Time: 4467  Group Topic: Psychoeducation    [address], [place]        Group Therapy Note    Attendees: 7    Facilitator administered Leisure Assessments for participants to take inventory of their leisure lifestyles. Group discussed the importance of regular participation in healthy recreation and leisure and the role it plays in recovery and mental health. Participants individually filled out their assessments and answered questions about their favorite leisure activities, lifestyle, social details, barriers, beliefs and coping, self perception, strengths and limitations, and more. Participants asked questions as needed and turned in the assessment at the end of group. Notes:  Patient was present and engaged during group and fully participated in completing individual assessment. Patient was vocal, asked questions, and turned in the assessment upon completion. Status After Intervention:  Improved    Participation Level:  Active Listener, Interactive, and Monopolizing    Participation Quality: Attentive, Sharing, and Supportive      Speech:  pressured and loud      Thought Process/Content: Logical  Linear      Affective Functioning: Congruent      Mood: euthymic      Level of consciousness:  Alert and Oriented x4      Response to Learning: Able to verbalize current knowledge/experience, Able to verbalize/acknowledge new learning, Able to retain information, Capable of insight, and Progressing to goal      Endings: None Reported    Modes of Intervention: Education, Support, Socialization, Clarifying, Problem-solving, and Activity      Discipline Responsible: Psychoeducational Specialist and Recreational Therapist      Signature:  Jefferson Hogue MA, Becca Looney

## 2023-07-13 NOTE — PROGRESS NOTES
21:19, C/O itching & clogged nose d/t allergies, unable to specify specific trigger for the allergy, Benadryl 50mg given as PRN

## 2023-07-13 NOTE — DISCHARGE INSTRUCTIONS
Advanced Directives:  Patient does not have a surrogate decision maker appointed   Name (if yes): N/A Phone Number: N/A  Patient does not have a psychiatric and/ or medical advanced directive or power of . Patient was offered psychiatric and/ or medical advanced directive or power of  information/completion but declined to complete   Why not? N/A    Discharge Planning is Complete. Discharge Date: 7/13/23  Reason for Hospitalization: Suicidal Ideation  Discharge Diagnosis: Depression, unspecified  Discharging to: Homeless Shelter     Your instructions: Your physician here was Krystina Arredondo MD. If you have any questions please call the unit at [phone] for the adult unit or [phone] for [facility]. Please note that we have a patient family advisory [place] that meets the second Wednesday of January and the second Wednesday of July at [address] [place] [address] in [place] [facility]. Department leadership would love for you to attend to give feedback on what we are doing well and areas in which we can improve our patient care. Please come if you are able and feel free to reach out to [address] [place] [address] at [phone] with any questions. The crisis number for [facility] is 281-CARE. This crisis line is available 24 hours a day, seven days a week. Please follow up with your PCP regarding any pending labs. You reported that you are currently experiencing homelessness. We have outlined the following steps so that you can get connected to the services you need for shelter, support, health care and mental health care/substance use disorder treatment. 1) Upon discharge, you are being referred to [facility] ran by [facility] [employer] [facility]. They are located at [address] [place] [address] and their phone number is [phone].     2) Upon arrival at the shelter, you will meet with the intake team who will assist

## 2023-07-13 NOTE — PLAN OF CARE
Pt. Appeared in vibrant mood, playing & socializing with co patients, he participated in the group session, denied any SI/HI/AVH, denied any currnet anxiety issues. He was cooperative & compliant with medication       Problem: Anxiety  Goal: Will report anxiety at manageable levels  Description: INTERVENTIONS:  1. Administer medication as ordered  2. Teach and rehearse alternative coping skills  3. Provide emotional support with 1:1 interaction with staff  7/12/2023 2142 by Perry Hinojosa RN  Outcome: Progressing     Problem: Depression  Goal: Will be euthymic at discharge  Description: INTERVENTIONS:  1. Administer medication as ordered  2. Provide emotional support via 1:1 interaction with staff  3. Encourage involvement in milieu/groups/activities  4.  Monitor for social isolation  7/12/2023 2142 by Perry Hinojosa RN  Outcome: Progressing     Problem: Pain  Goal: Verbalizes/displays adequate comfort level or baseline comfort level  Outcome: Progressing

## 2023-07-13 NOTE — PLAN OF CARE
Problem: Anxiety  Goal: Will report anxiety at manageable levels  Description: INTERVENTIONS:  1. Administer medication as ordered  2. Teach and rehearse alternative coping skills  3. Provide emotional support with 1:1 interaction with staff  Outcome: Adequate for Discharge     Problem: Coping  Goal: Pt/Family able to verbalize concerns and demonstrate effective coping strategies  Description: INTERVENTIONS:  1. Assist patient/family to identify coping skills, available support systems and cultural and spiritual values  2. Provide emotional support, including active listening and acknowledgement of concerns of patient and caregivers  3. Reduce environmental stimuli, as able  4. Instruct patient/family in relaxation techniques, as appropriate  5. Assess for spiritual pain/suffering and initiate Spiritual Care, Psychosocial Clinical Specialist consults as needed  Outcome: Adequate for Discharge     Problem: Behavior  Goal: Pt/Family maintain appropriate behavior and adhere to behavioral management agreement, if implemented  Description: INTERVENTIONS:  1. Assess patient/family's coping skills and  non-compliant behavior (including use of illegal substances)  2. Notify security of behavior or suspected illegal substances which indicate the need for search of the family and/or belongings  3. Encourage verbalization of thoughts and concerns in a socially appropriate manner  4. Utilize positive, consistent limit setting strategies supporting safety of patient, staff and others  5. Encourage participation in the decision making process about the behavioral management agreement  6. If a visitor's behavior poses a threat to safety call refer to organization policy. 7. Initiate consult with , Psychosocial CNS, Spiritual Care as appropriate  Outcome: Adequate for Discharge     Problem: Depression  Goal: Will be euthymic at discharge  Description: INTERVENTIONS:  1. Administer medication as ordered  2.  Provide

## 2023-07-13 NOTE — BH NOTE
Bridge Appointment completed: Reviewed Discharge Instructions with patient. Patient verbalizes understanding and agreement with the discharge plan using the teachback method.      Referral for Outpatient Tobacco Cessation Counseling, upon discharge (brijesh X if applicable and completed):    ( )  Hospital staff assisted patient to call Quit Line or faxed referral                                   during hospitalization                  ( )  Recognizing danger situations (included triggers and roadblocks), if not completed on admission                    ( )  Coping skills (new ways to manage stress, exercise, relaxation techniques, changing routine, distraction), if not completed on admission                                                           ( )  Basic information about quitting (benefits of quitting, techniques in how to quit, available resources, if not completed on admission  ( ) Referral for counseling faxed to [address] [place] [address]   (x ) Patient refused referral  (x ) Patient refused counseling  ( x) Patient refused smoking cessation medication upon discharge    Vaccinations (brijesh X if applicable and completed):  ( ) Patient states already received influenza vaccine elsewhere  ( ) Patient received influenza vaccine during this hospitalization  ( ) Patient refused influenza vaccine at this time  (x ) Not offered

## 2023-07-13 NOTE — BH NOTE
Writer met with patient and discussed discharge plan. Patient reported that he wanted a shelter somewhere in [place]. Writer explained [facility] and that they provided case management resources.

## 2023-07-13 NOTE — GROUP NOTE
Group Therapy Note    Date: 2023    Group Start Time:   Group End Time:   Group Topic: Wrap-Up    [address] [facility]        Group Therapy Note    Attendees: 10       Patient's Goal:  Pt did not have a goal documented from AM group. Pt stated that he is hopeful that he's getting d/c tomorrow or Friday as he had a  to go to. Pt made a very inappropriate comment regarding going to the  being his only \"positive\" thing this week. I was able to redirect patient regarding being appropriate especially in a group setting, as I am sure pt is stating things just for shock value in others. Pt was able to come up with other appropriate \"positive\" thing being here on the unit regarding his wii lori goals and was proud of his new airplane on a specific game. Notes:  Pt was loud and slightly obnoxious during group, but was able to be redirected. Pt seeks attention, not necessarily in a negative manner, but can be excessive and inappropriate at times. Status After Intervention:  Improved    Participation Level:  Active Listener, Interactive, and Monopolizing    Participation Quality: Sharing and Inappropriate      Speech:  loud      Thought Process/Content: Linear      Affective Functioning: Congruent      Mood: elevated      Level of consciousness:  Alert, Oriented x4, and Attentive      Response to Learning: Able to verbalize current knowledge/experience, Able to verbalize/acknowledge new learning, Able to retain information, Able to change behavior, and Progressing to goal      Endings: None Reported    Modes of Intervention: Education, Support, Socialization, Exploration, Clarifying, Problem-solving, Activity, Movement, Media, and Confrontation      Discipline Responsible: [address]      Signature:  Layla Falcon

## 2023-07-14 ENCOUNTER — FOLLOWUP TELEPHONE ENCOUNTER (OUTPATIENT)
Dept: PSYCHIATRY | Age: 23
End: 2023-07-14

## 2023-08-08 ENCOUNTER — HOSPITAL ENCOUNTER (INPATIENT)
Facility: HOSPITAL | Age: 23
LOS: 7 days | Discharge: HOME | DRG: 885 | End: 2023-08-15
Attending: PSYCHIATRY & NEUROLOGY | Admitting: PSYCHIATRY & NEUROLOGY
Payer: MEDICAID

## 2023-08-08 ENCOUNTER — HOSPITAL ENCOUNTER (EMERGENCY)
Facility: HOSPITAL | Age: 23
Discharge: PSYCHIATRIC OR SUBSTANCE USE FACILITY | DRG: 885 | End: 2023-08-08
Attending: STUDENT IN AN ORGANIZED HEALTH CARE EDUCATION/TRAINING PROGRAM | Admitting: STUDENT IN AN ORGANIZED HEALTH CARE EDUCATION/TRAINING PROGRAM
Payer: MEDICAID

## 2023-08-08 ENCOUNTER — APPOINTMENT (OUTPATIENT)
Dept: GENERAL RADIOLOGY | Facility: HOSPITAL | Age: 23
DRG: 885 | End: 2023-08-08
Payer: MEDICAID

## 2023-08-08 VITALS
TEMPERATURE: 97.5 F | WEIGHT: 275 LBS | SYSTOLIC BLOOD PRESSURE: 145 MMHG | OXYGEN SATURATION: 96 % | DIASTOLIC BLOOD PRESSURE: 87 MMHG | HEART RATE: 84 BPM | BODY MASS INDEX: 36.45 KG/M2 | RESPIRATION RATE: 18 BRPM | HEIGHT: 73 IN

## 2023-08-08 DIAGNOSIS — R45.89 SUICIDAL BEHAVIOR WITHOUT ATTEMPTED SELF-INJURY: Primary | ICD-10-CM

## 2023-08-08 PROBLEM — R45.851 SUICIDAL IDEATION: Status: ACTIVE | Noted: 2023-08-08

## 2023-08-08 LAB
ALBUMIN SERPL-MCNC: 4.2 G/DL (ref 3.5–5.2)
ALBUMIN/GLOB SERPL: 1.8 G/DL
ALP SERPL-CCNC: 80 U/L (ref 39–117)
ALT SERPL W P-5'-P-CCNC: 85 U/L (ref 1–41)
AMPHET+METHAMPHET UR QL: NEGATIVE
AMPHETAMINES UR QL: POSITIVE
ANION GAP SERPL CALCULATED.3IONS-SCNC: 10.9 MMOL/L (ref 5–15)
AST SERPL-CCNC: 37 U/L (ref 1–40)
BARBITURATES UR QL SCN: NEGATIVE
BASOPHILS # BLD AUTO: 0.02 10*3/MM3 (ref 0–0.2)
BASOPHILS NFR BLD AUTO: 0.2 % (ref 0–1.5)
BENZODIAZ UR QL SCN: NEGATIVE
BILIRUB SERPL-MCNC: 0.3 MG/DL (ref 0–1.2)
BILIRUB UR QL STRIP: NEGATIVE
BUN SERPL-MCNC: 11 MG/DL (ref 6–20)
BUN/CREAT SERPL: 11.6 (ref 7–25)
BUPRENORPHINE SERPL-MCNC: NEGATIVE NG/ML
CALCIUM SPEC-SCNC: 9.2 MG/DL (ref 8.6–10.5)
CANNABINOIDS SERPL QL: NEGATIVE
CHLORIDE SERPL-SCNC: 108 MMOL/L (ref 98–107)
CLARITY UR: CLEAR
CO2 SERPL-SCNC: 23.1 MMOL/L (ref 22–29)
COCAINE UR QL: NEGATIVE
COLOR UR: ABNORMAL
CREAT SERPL-MCNC: 0.95 MG/DL (ref 0.76–1.27)
DEPRECATED RDW RBC AUTO: 37.2 FL (ref 37–54)
EGFRCR SERPLBLD CKD-EPI 2021: 115.3 ML/MIN/1.73
EOSINOPHIL # BLD AUTO: 0.2 10*3/MM3 (ref 0–0.4)
EOSINOPHIL NFR BLD AUTO: 2.3 % (ref 0.3–6.2)
ERYTHROCYTE [DISTWIDTH] IN BLOOD BY AUTOMATED COUNT: 12.5 % (ref 12.3–15.4)
ETHANOL BLD-MCNC: <10 MG/DL (ref 0–10)
ETHANOL UR QL: <0.01 %
FENTANYL UR-MCNC: NEGATIVE NG/ML
FLUAV RNA RESP QL NAA+PROBE: NOT DETECTED
FLUBV RNA RESP QL NAA+PROBE: NOT DETECTED
GLOBULIN UR ELPH-MCNC: 2.3 GM/DL
GLUCOSE SERPL-MCNC: 100 MG/DL (ref 65–99)
GLUCOSE UR STRIP-MCNC: NEGATIVE MG/DL
HCT VFR BLD AUTO: 43.6 % (ref 37.5–51)
HGB BLD-MCNC: 14.5 G/DL (ref 13–17.7)
HGB UR QL STRIP.AUTO: NEGATIVE
IMM GRANULOCYTES # BLD AUTO: 0.02 10*3/MM3 (ref 0–0.05)
IMM GRANULOCYTES NFR BLD AUTO: 0.2 % (ref 0–0.5)
KETONES UR QL STRIP: NEGATIVE
LEUKOCYTE ESTERASE UR QL STRIP.AUTO: NEGATIVE
LYMPHOCYTES # BLD AUTO: 3.19 10*3/MM3 (ref 0.7–3.1)
LYMPHOCYTES NFR BLD AUTO: 37 % (ref 19.6–45.3)
MAGNESIUM SERPL-MCNC: 2 MG/DL (ref 1.6–2.6)
MCH RBC QN AUTO: 27.4 PG (ref 26.6–33)
MCHC RBC AUTO-ENTMCNC: 33.3 G/DL (ref 31.5–35.7)
MCV RBC AUTO: 82.4 FL (ref 79–97)
METHADONE UR QL SCN: NEGATIVE
MONOCYTES # BLD AUTO: 0.62 10*3/MM3 (ref 0.1–0.9)
MONOCYTES NFR BLD AUTO: 7.2 % (ref 5–12)
NEUTROPHILS NFR BLD AUTO: 4.57 10*3/MM3 (ref 1.7–7)
NEUTROPHILS NFR BLD AUTO: 53.1 % (ref 42.7–76)
NITRITE UR QL STRIP: NEGATIVE
NRBC BLD AUTO-RTO: 0 /100 WBC (ref 0–0.2)
OPIATES UR QL: NEGATIVE
OXYCODONE UR QL SCN: NEGATIVE
PCP UR QL SCN: NEGATIVE
PH UR STRIP.AUTO: 5.5 [PH] (ref 5–8)
PLATELET # BLD AUTO: 269 10*3/MM3 (ref 140–450)
PMV BLD AUTO: 9.3 FL (ref 6–12)
POTASSIUM SERPL-SCNC: 4.2 MMOL/L (ref 3.5–5.2)
PROPOXYPH UR QL: NEGATIVE
PROT SERPL-MCNC: 6.5 G/DL (ref 6–8.5)
PROT UR QL STRIP: NEGATIVE
QT INTERVAL: 390 MS
QTC INTERVAL: 423 MS
RBC # BLD AUTO: 5.29 10*6/MM3 (ref 4.14–5.8)
SARS-COV-2 RNA RESP QL NAA+PROBE: NOT DETECTED
SODIUM SERPL-SCNC: 142 MMOL/L (ref 136–145)
SP GR UR STRIP: 1.03 (ref 1–1.03)
TRICYCLICS UR QL SCN: NEGATIVE
UROBILINOGEN UR QL STRIP: ABNORMAL
WBC NRBC COR # BLD: 8.62 10*3/MM3 (ref 3.4–10.8)

## 2023-08-08 PROCEDURE — 93010 ELECTROCARDIOGRAM REPORT: CPT | Performed by: INTERNAL MEDICINE

## 2023-08-08 PROCEDURE — 71045 X-RAY EXAM CHEST 1 VIEW: CPT | Performed by: RADIOLOGY

## 2023-08-08 PROCEDURE — 99223 1ST HOSP IP/OBS HIGH 75: CPT | Performed by: PSYCHIATRY & NEUROLOGY

## 2023-08-08 PROCEDURE — 99285 EMERGENCY DEPT VISIT HI MDM: CPT

## 2023-08-08 PROCEDURE — 80053 COMPREHEN METABOLIC PANEL: CPT | Performed by: EMERGENCY MEDICINE

## 2023-08-08 PROCEDURE — 85025 COMPLETE CBC W/AUTO DIFF WBC: CPT | Performed by: EMERGENCY MEDICINE

## 2023-08-08 PROCEDURE — 93005 ELECTROCARDIOGRAM TRACING: CPT | Performed by: PSYCHIATRY & NEUROLOGY

## 2023-08-08 PROCEDURE — 82077 ASSAY SPEC XCP UR&BREATH IA: CPT | Performed by: EMERGENCY MEDICINE

## 2023-08-08 PROCEDURE — 80307 DRUG TEST PRSMV CHEM ANLYZR: CPT | Performed by: EMERGENCY MEDICINE

## 2023-08-08 PROCEDURE — 36415 COLL VENOUS BLD VENIPUNCTURE: CPT

## 2023-08-08 PROCEDURE — 71045 X-RAY EXAM CHEST 1 VIEW: CPT

## 2023-08-08 PROCEDURE — 81003 URINALYSIS AUTO W/O SCOPE: CPT | Performed by: EMERGENCY MEDICINE

## 2023-08-08 PROCEDURE — 83735 ASSAY OF MAGNESIUM: CPT | Performed by: EMERGENCY MEDICINE

## 2023-08-08 PROCEDURE — 87636 SARSCOV2 & INF A&B AMP PRB: CPT | Performed by: EMERGENCY MEDICINE

## 2023-08-08 RX ORDER — BENZTROPINE MESYLATE 1 MG/1
2 TABLET ORAL ONCE AS NEEDED
Status: DISCONTINUED | OUTPATIENT
Start: 2023-08-08 | End: 2023-08-15 | Stop reason: HOSPADM

## 2023-08-08 RX ORDER — ACETAMINOPHEN 325 MG/1
650 TABLET ORAL EVERY 6 HOURS PRN
Status: ON HOLD | COMMUNITY
End: 2023-08-08

## 2023-08-08 RX ORDER — CHOLECALCIFEROL (VITAMIN D3) 125 MCG
5 CAPSULE ORAL NIGHTLY PRN
Status: ON HOLD | COMMUNITY
End: 2023-08-08

## 2023-08-08 RX ORDER — ALUMINA, MAGNESIA, AND SIMETHICONE 2400; 2400; 240 MG/30ML; MG/30ML; MG/30ML
15 SUSPENSION ORAL EVERY 6 HOURS PRN
Status: DISCONTINUED | OUTPATIENT
Start: 2023-08-08 | End: 2023-08-15 | Stop reason: HOSPADM

## 2023-08-08 RX ORDER — QUETIAPINE FUMARATE 100 MG/1
200 TABLET, FILM COATED ORAL 2 TIMES DAILY
Status: DISCONTINUED | OUTPATIENT
Start: 2023-08-08 | End: 2023-08-09

## 2023-08-08 RX ORDER — BENZONATATE 100 MG/1
100 CAPSULE ORAL 3 TIMES DAILY PRN
Status: DISCONTINUED | OUTPATIENT
Start: 2023-08-08 | End: 2023-08-15 | Stop reason: HOSPADM

## 2023-08-08 RX ORDER — ONDANSETRON 4 MG/1
4 TABLET, FILM COATED ORAL EVERY 6 HOURS PRN
Status: DISCONTINUED | OUTPATIENT
Start: 2023-08-08 | End: 2023-08-15 | Stop reason: HOSPADM

## 2023-08-08 RX ORDER — QUETIAPINE FUMARATE 200 MG/1
200 TABLET, FILM COATED ORAL 2 TIMES DAILY
COMMUNITY
End: 2023-08-15 | Stop reason: HOSPADM

## 2023-08-08 RX ORDER — FAMOTIDINE 20 MG/1
20 TABLET, FILM COATED ORAL 2 TIMES DAILY PRN
Status: DISCONTINUED | OUTPATIENT
Start: 2023-08-08 | End: 2023-08-15 | Stop reason: HOSPADM

## 2023-08-08 RX ORDER — PRAZOSIN HYDROCHLORIDE 1 MG/1
1 CAPSULE ORAL NIGHTLY
Status: DISCONTINUED | OUTPATIENT
Start: 2023-08-08 | End: 2023-08-09

## 2023-08-08 RX ORDER — BENZTROPINE MESYLATE 1 MG/ML
1 INJECTION INTRAMUSCULAR; INTRAVENOUS ONCE AS NEEDED
Status: DISCONTINUED | OUTPATIENT
Start: 2023-08-08 | End: 2023-08-15 | Stop reason: HOSPADM

## 2023-08-08 RX ORDER — ALBUTEROL SULFATE 90 UG/1
2 AEROSOL, METERED RESPIRATORY (INHALATION) EVERY 4 HOURS PRN
Status: ON HOLD | COMMUNITY
End: 2023-08-08

## 2023-08-08 RX ORDER — LOPERAMIDE HYDROCHLORIDE 2 MG/1
2 CAPSULE ORAL
Status: DISCONTINUED | OUTPATIENT
Start: 2023-08-08 | End: 2023-08-15 | Stop reason: HOSPADM

## 2023-08-08 RX ORDER — ECHINACEA PURPUREA EXTRACT 125 MG
2 TABLET ORAL AS NEEDED
Status: DISCONTINUED | OUTPATIENT
Start: 2023-08-08 | End: 2023-08-15 | Stop reason: HOSPADM

## 2023-08-08 RX ORDER — HYDROXYZINE 50 MG/1
50 TABLET, FILM COATED ORAL EVERY 6 HOURS PRN
Status: DISCONTINUED | OUTPATIENT
Start: 2023-08-08 | End: 2023-08-15 | Stop reason: HOSPADM

## 2023-08-08 RX ORDER — IBUPROFEN 400 MG/1
400 TABLET ORAL EVERY 6 HOURS PRN
Status: DISCONTINUED | OUTPATIENT
Start: 2023-08-08 | End: 2023-08-15 | Stop reason: HOSPADM

## 2023-08-08 RX ORDER — TRAZODONE HYDROCHLORIDE 50 MG/1
50 TABLET ORAL NIGHTLY PRN
Status: DISCONTINUED | OUTPATIENT
Start: 2023-08-08 | End: 2023-08-15 | Stop reason: HOSPADM

## 2023-08-08 RX ADMIN — PRAZOSIN HYDROCHLORIDE 1 MG: 1 CAPSULE ORAL at 21:56

## 2023-08-08 RX ADMIN — QUETIAPINE FUMARATE 200 MG: 100 TABLET ORAL at 21:56

## 2023-08-08 RX ADMIN — ALUMINUM HYDROXIDE, MAGNESIUM HYDROXIDE, AND DIMETHICONE 15 ML: 400; 400; 40 SUSPENSION ORAL at 07:25

## 2023-08-08 NOTE — PLAN OF CARE
Goal Outcome Evaluation:  Plan of Care Reviewed With: patient  Patient Agreement with Plan of Care: agrees     Progress: no change  Outcome Evaluation: New admit

## 2023-08-08 NOTE — ED PROVIDER NOTES
Subjective     History provided by:  Patient  Mental Health Problem  Presenting symptoms: suicidal thoughts    Patient accompanied by:  Law enforcement  Degree of incapacity (severity):  Severe  Onset quality:  Sudden  Duration:  1 day  Timing:  Constant  Progression:  Worsening  Chronicity:  Recurrent  Context: drug abuse (meth) and stressful life event    Treatment compliance:  Some of the time  Relieved by:  Nothing  Associated symptoms: feelings of worthlessness    Associated symptoms: no abdominal pain and no chest pain    Risk factors: hx of mental illness      Review of Systems   Constitutional: Negative.  Negative for fever.   HENT: Negative.     Respiratory: Negative.     Cardiovascular: Negative.  Negative for chest pain.   Gastrointestinal: Negative.  Negative for abdominal pain.   Endocrine: Negative.    Genitourinary: Negative.  Negative for dysuria.   Skin: Negative.    Neurological: Negative.    Psychiatric/Behavioral:  Positive for suicidal ideas.    All other systems reviewed and are negative.    Past Medical History:   Diagnosis Date    Anxiety     Bipolar 1 disorder     Depression     PTSD (post-traumatic stress disorder)     Substance abuse        Allergies   Allergen Reactions    Capsicum Annuum Extract & Derivative (Bell Pepper) [Capsicum] Hives    Fish-Derived Products Anaphylaxis    Onion Anaphylaxis    Tomato Itching       Past Surgical History:   Procedure Laterality Date    KNEE ACL RECONSTRUCTION Right        History reviewed. No pertinent family history.    Social History     Socioeconomic History    Marital status: Single    Number of children: 1    Years of education: 12th grade    Highest education level: 12th grade   Tobacco Use    Smoking status: Former     Packs/day: 0.50     Types: Cigarettes    Smokeless tobacco: Current     Types: Snuff   Vaping Use    Vaping Use: Former   Substance and Sexual Activity    Alcohol use: Never     Comment: social    Drug use: Yes     Types:  "Methamphetamines, Cocaine(coke), \"Crack\" cocaine     Comment: reports used meth about three hours ago.    Sexual activity: Not Currently     Partners: Female           Objective   Physical Exam  Vitals and nursing note reviewed.   Constitutional:       General: He is not in acute distress.     Appearance: He is well-developed. He is not diaphoretic.   HENT:      Head: Normocephalic and atraumatic.      Right Ear: External ear normal.      Left Ear: External ear normal.      Nose: Nose normal.   Eyes:      Conjunctiva/sclera: Conjunctivae normal.   Neck:      Vascular: No JVD.      Trachea: No tracheal deviation.   Cardiovascular:      Rate and Rhythm: Normal rate.      Heart sounds: No murmur heard.  Pulmonary:      Effort: Pulmonary effort is normal. No respiratory distress.      Breath sounds: No wheezing.   Abdominal:      Palpations: Abdomen is soft.      Tenderness: There is no abdominal tenderness.   Musculoskeletal:         General: No deformity. Normal range of motion.      Cervical back: Normal range of motion and neck supple.   Skin:     General: Skin is warm and dry.      Coloration: Skin is not pale.      Findings: No erythema or rash.   Neurological:      Mental Status: He is alert and oriented to person, place, and time.      Cranial Nerves: No cranial nerve deficit.   Psychiatric:      Comments: Patient verbalized increased thoughts of suicide.  Patient does have mental illness.  Patient at 1 point time was on Seroquel has not taken this in several weeks.  Patient also states recent methamphetamine use.       Procedures           ED Course  ED Course as of 08/08/23 0145   Tue Aug 08, 2023   0134 No acute cardiopulmonary process. [LK]   0143 Patient will be admitted to inpatient behavioral health unit. [RB]      ED Course User Index  [LK] Magui De Los Santos DO  [RB] Anderson Kong II, PA                                           Medical Decision Making  23-year-old with suicidal ideations    Problems " Addressed:  Suicidal behavior without attempted self-injury: acute illness or injury     Details: Patient was evaluated with our ER behavioral health unit.  Deemed appropriate for admission.  Patient medically cleared for behavioral health admission    Amount and/or Complexity of Data Reviewed  Radiology: ordered. Decision-making details documented in ED Course.    Risk  Decision regarding hospitalization.        Final diagnoses:   Suicidal behavior without attempted self-injury       ED Disposition  ED Disposition       ED Disposition   DC/Transfer to Behavioral Health    Condition   --    Comment   --               No follow-up provider specified.       Medication List      No changes were made to your prescriptions during this visit.            Anderson Kong II, PA  08/08/23 0145
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
## 2023-08-08 NOTE — NURSING NOTE
Spoke to Dr. YANETH Garcia via phone. Intake information provided. Instructed to admit the patient. Admit orders received. SP3 routine orders RBTOx2. Patient and ED provider/Kong made aware of plan of care. Safety precautions maintained.
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
## 2023-08-08 NOTE — PLAN OF CARE
Goal Outcome Evaluation:  Plan of Care Reviewed With: patient  Patient Agreement with Plan of Care: agrees      Patient reports he was brought in by police after he contacted them because he wanted to slit his throat. Patient is homeless and currently uses methamphetamines. Patient has a history of depression, anxiety, substance use, and PTSD. Patient states he feels like he has no reason to live anymore and that he has no purpose in life. Patient states his stepmother  three days ago and this has been a big factor in how he is feeling. Patient states he tried to slit his throat a couple days ago in a bathroom but his friend walked in on him and stopped him. He reports trouble sleeping and eating.
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
## 2023-08-09 PROCEDURE — 99232 SBSQ HOSP IP/OBS MODERATE 35: CPT | Performed by: PSYCHIATRY & NEUROLOGY

## 2023-08-09 RX ORDER — PRAZOSIN HYDROCHLORIDE 1 MG/1
2 CAPSULE ORAL NIGHTLY
Status: DISCONTINUED | OUTPATIENT
Start: 2023-08-09 | End: 2023-08-15 | Stop reason: HOSPADM

## 2023-08-09 RX ORDER — FLUOXETINE HYDROCHLORIDE 20 MG/1
20 CAPSULE ORAL DAILY
Status: DISCONTINUED | OUTPATIENT
Start: 2023-08-09 | End: 2023-08-15 | Stop reason: HOSPADM

## 2023-08-09 RX ORDER — QUETIAPINE FUMARATE 100 MG/1
50 TABLET, FILM COATED ORAL NIGHTLY
Status: DISCONTINUED | OUTPATIENT
Start: 2023-08-09 | End: 2023-08-09

## 2023-08-09 RX ADMIN — PRAZOSIN HYDROCHLORIDE 2 MG: 1 CAPSULE ORAL at 20:51

## 2023-08-09 NOTE — PLAN OF CARE
Goal Outcome Evaluation:  Plan of Care Reviewed With: patient  Patient Agreement with Plan of Care: agrees     Progress: no change  Outcome Evaluation: Patient withdrawn, cooperative, minimally participating.  Patient lying in bed most of shift. Patient encouraged to perform personal hygiene, offered products. Patient denies suicidal or homicidal ideation

## 2023-08-09 NOTE — PLAN OF CARE
Goal Outcome Evaluation:   Consent Given to Review Plan with: Declines  Progress: improving  Outcome Evaluation: Therapist met with patient to review plan of care; patient agreeable.         DATA:      Therapist discussed case with Dr. Bradley and met with patient today to review coping skills, review plan of care, and discuss discharge.     Clinical Maneuvering/Intervention:     Therapist assisted patient in processing above session content; acknowledged and normalized patient's thoughts, feelings, and concerns.  Discussed the therapist/patient relationship and explain the parameters and limitations of relative confidentiality.  Also discussed the importance of active participation, and honesty to the treatment process.  Encouraged the patient to discuss/vent their feelings, frustrations, and fears concerning their ongoing medical issues and validated their feelings.     Allowed patient to freely discuss issues without interruption or judgment. Provided safe, confidential environment to facilitate the development of positive therapeutic relationship and encourage open, honest communication.      Therapist addressed discharge safety planning this date. Assisted patient in identifying risk factors which would indicate the need for higher level of care after discharge;  including thoughts to harm self or others and/or self-harming behavior. Encouraged patient to call 911, or present to the nearest emergency room should any of these events occur. Discussed crisis intervention services and means to access.  Encouraged securing any objects of harm.    Therapist completed integrated summary, treatment plan, and initiated social history this date.  Therapist is strongly encouraging family involvement in treatment.       Encouraged mask wearing, social distancing, and regular hand washing due to COVID19 risk.      ASSESSMENT:      Therapist met 1:1 with patient today. Patient reports minimal change in symptoms today. He  reports ongoing depression, insomnia, anxiety, and low motivation. Patient reports intermittent SI. He continues to be agreeable with rehab referral. Patient reports his anxiety has been high today. Therapist and patient reviewed healthy coping skills. Therapist encouraged the patient to spend some time outside of his room as he has primarily isolated himself during his stay. Patient was agreeable and came out to the day room.      PLAN:       Patient to remain hospitalized this date.      Treatment team will focus efforts on stabilizing patient's acute symptoms while providing education on healthy coping and crisis management to reduce hospitalizations.   Patient requires daily psychiatrist evaluation and 24/7 nursing supervision to promote patient  safety.     Therapist will offer 1-4 individual sessions, 1 therapy group daily, family education, and appropriate referral.

## 2023-08-09 NOTE — PLAN OF CARE
Goal Outcome Evaluation:  Plan of Care Reviewed With: patient  Patient Agreement with Plan of Care: agrees     Progress: improving  Outcome Evaluation: Patient cooperative during assessment, engaged appropriately with staff and peers. Patient requested additional snacks from staff due to not being able to partake in provided meal r/t allergies. Patient rested well this shift.

## 2023-08-09 NOTE — PROGRESS NOTES
"INPATIENT PSYCHIATRIC PROGRESS NOTE    Name:  Linwood Patel  :  2000  MRN:  [id number]  Visit Number:  [id number]  Length of stay:  1    SUBJECTIVE    CC/Focus of Exam: Depression, SI    INTERVAL HISTORY:  Patient reports no significant improvement today, with ongoing depression, low mood, low motivation, low energy, hopelessness, SI.  Patient reports insomnia, specifically with sleep maintenance.  He reports previously prescribed lithium was not helpful, but believes fluoxetine has been at some point.    Depression rating 8/10  Anxiety rating 7/10  Sleep: Poor  Withdrawal sx: Denied  Cravin/10    Review of Systems   Constitutional: Negative.  Positive for fatigue.   Respiratory: Negative.     Cardiovascular: Negative.    Gastrointestinal: Negative.    Musculoskeletal: Negative.    Psychiatric/Behavioral:  Positive for dysphoric mood and suicidal ideas. The patient is nervous/anxious.      OBJECTIVE    Temp:  [96.4 øF (35.8 øC)-97.9 øF (36.6 øC)] 97.9 øF (36.6 øC)  Heart Rate:  [] 60  Resp:  [16-18] 16  BP: (110-141)/(51-73) 141/73    MENTAL STATUS EXAM:  Appearance: Casually dressed, fair hygeine.   Cooperation: Guarded  Psychomotor: Mild psychomotor agitation/retardation, No EPS, No motor tics  Speech: normal rate, amount.  Mood: \"Same\"   Affect: congruent, restricted  Thought Content: goal directed, no delusional material present  Thought process: linear, organized.  Suicidality: + SI  Homicidality: No HI  Perception: No AH/VH  Insight: fair   Judgment: fair    Lab Results (last 24 hours)       ** No results found for the last 24 hours. **               Imaging Results (Last 24 Hours)       ** No results found for the last 24 hours. **               ECG/EMG Results (most recent)       Procedure Component Value Units Date/Time    ECG 12 Lead Other [[id number]] Collected: 23 0245     Updated: 23 182     QT Interval 390 ms      QTC Interval 423 ms     Narrative:      Test Reason : " Baseline Cardiac Status~  Blood Pressure :   */*   mmHG  Vent. Rate :  71 BPM     Atrial Rate :  71 BPM     P-R Int : 142 ms          QRS Dur :  86 ms      QT Int : 390 ms       P-R-T Axes :  24  26  29 degrees     QTc Int : 423 ms    Normal sinus rhythm  Normal ECG  No previous ECGs available  Confirmed by Ragini Bush ([id number]) on 8/8/2023 6:27:25 PM    Referred By:            Confirmed By: Ragini Bush             ALLERGIES: Capsicum annuum extract & derivative (bell pepper) [capsicum], Fish-derived products, Onion, Peanut-containing drug products, and Tomato      Current Facility-Administered Medications:     aluminum-magnesium hydroxide-simethicone (MAALOX MAX) 400-400-40 MG/5ML suspension 15 mL, 15 mL, Oral, Q6H PRN, Uday Garcia MD, 15 mL at 08/08/23 0725    benzonatate (TESSALON) capsule 100 mg, 100 mg, Oral, TID PRN, Uday Garcia MD    benztropine (COGENTIN) tablet 2 mg, 2 mg, Oral, Once PRN **OR** benztropine (COGENTIN) injection 1 mg, 1 mg, Intramuscular, Once PRN, Uday Garcia MD    famotidine (PEPCID) tablet 20 mg, 20 mg, Oral, BID PRN, Uday Garcia MD    hydrOXYzine (ATARAX) tablet 50 mg, 50 mg, Oral, Q6H PRN, Uday Garcia MD    ibuprofen (ADVIL,MOTRIN) tablet 400 mg, 400 mg, Oral, Q6H PRN, Uday Garcia MD    loperamide (IMODIUM) capsule 2 mg, 2 mg, Oral, Q2H PRN, Uday Garcia MD    magnesium hydroxide (MILK OF MAGNESIA) suspension 10 mL, 10 mL, Oral, Daily PRN, Uday Garcia MD    ondansetron (ZOFRAN) tablet 4 mg, 4 mg, Oral, Q6H PRN, Uday Garcia MD    prazosin (MINIPRESS) capsule 1 mg, 1 mg, Oral, Nightly, Jose Bradley MD, 1 mg at 08/08/23 2156    QUEtiapine (SEROquel) tablet 200 mg, 200 mg, Oral, BID, Jose Bradley MD, 200 mg at 08/08/23 2156    sodium chloride nasal spray 2 spray, 2 spray, Each Nare, PRN, Uday Garcia MD    traZODone (DESYREL) tablet 50 mg, 50 mg, Oral, Nightly PRN, Uday Garcia MD    Reviewed chart, notes, vitals,  labs and EKG personally reviewed.    ASSESSMENT & PLAN:      Suicidal Ideation  -SI with plan  -Admit for crisis stabilization  -SP3     Unspecified depressive disorder  -Pt previously stable on lithium XR 450mg BID.  He reports it was ineffective  -Begin fluoxetine 20 mg daily  -We will establish outpatient psychiatric care following hospitalization     Post Traumatic Stress Disorder  -Increase prazosin to 2 mg nightly  -OP care as above     Methamphetamine use disorder, severe, dependence  -Admission UDS positive  -Supportive care  -We will refer for substance abuse treatment following hospitalization     The patient has been admitted for safety and stabilization.  Patient will be monitored for suicidality daily and maintained on Special Precautions Level 3 (q15 min checks) .  The patient will have individual and group therapy with a master's level therapist. A master treatment plan will be developed and agreed upon by the patient and his/her treatment team.  The patient's estimated length of stay in the hospital is 5-7 days.     Special precautions: Special Precautions Level 3 (q15 min checks)     Behavioral Health Treatment Plan and Problem List: I have reviewed and approved the Behavioral Health Treatment Plan and Problem list.  The patient has had a chance to review and agrees with the treatment plan.     Clinician:  Jose Bradley MD  08/09/23  13:08 EDT

## 2023-08-10 PROCEDURE — 99232 SBSQ HOSP IP/OBS MODERATE 35: CPT | Performed by: PSYCHIATRY & NEUROLOGY

## 2023-08-10 RX ORDER — QUETIAPINE FUMARATE 100 MG/1
200 TABLET, FILM COATED ORAL NIGHTLY
Status: DISCONTINUED | OUTPATIENT
Start: 2023-08-10 | End: 2023-08-15 | Stop reason: HOSPADM

## 2023-08-10 RX ADMIN — QUETIAPINE FUMARATE 200 MG: 100 TABLET ORAL at 22:01

## 2023-08-10 RX ADMIN — PRAZOSIN HYDROCHLORIDE 2 MG: 1 CAPSULE ORAL at 22:01

## 2023-08-10 RX ADMIN — FLUOXETINE HYDROCHLORIDE 20 MG: 20 CAPSULE ORAL at 12:53

## 2023-08-10 NOTE — PLAN OF CARE
Goal Outcome Evaluation:   Consent Given to Review Plan with: Declines  Progress: improving  Outcome Evaluation: Therapist met with patient to review plan of care; patient agreeable.         DATA:      Therapist discussed case with Dr. Bradley and met with patient today to review coping skills, review plan of care, and discuss discharge.    Sent referral to [facility].      Clinical Maneuvering/Intervention:     Therapist assisted patient in processing above session content; acknowledged and normalized patient's thoughts, feelings, and concerns.  Discussed the therapist/patient relationship and explain the parameters and limitations of relative confidentiality.  Also discussed the importance of active participation, and honesty to the treatment process.  Encouraged the patient to discuss/vent their feelings, frustrations, and fears concerning their ongoing medical issues and validated their feelings.     Allowed patient to freely discuss issues without interruption or judgment. Provided safe, confidential environment to facilitate the development of positive therapeutic relationship and encourage open, honest communication.      Therapist addressed discharge safety planning this date. Assisted patient in identifying risk factors which would indicate the need for higher level of care after discharge;  including thoughts to harm self or others and/or self-harming behavior. Encouraged patient to call 911, or present to the nearest emergency room should any of these events occur. Discussed crisis intervention services and means to access.  Encouraged securing any objects of harm.    Therapist completed integrated summary, treatment plan, and initiated social history this date.  Therapist is strongly encouraging family involvement in treatment.       Encouraged mask wearing, social distancing, and regular hand washing due to COVID19 risk.      ASSESSMENT:      Therapist met 1:1 with patient today. Patient denies suicidal  ideation. Patient denies homicidal ideation. Patient denies AVH. Patient is calm and cooperative. Patient reports improvement in mood and symptoms, particularly anxiety and motivation. Patient is hopeful to go to rehab soon. He denies needs or concerns.      PLAN:       Patient to remain hospitalized this date.      Treatment team will focus efforts on stabilizing patient's acute symptoms while providing education on healthy coping and crisis management to reduce hospitalizations.   Patient requires daily psychiatrist evaluation and 24/7 nursing supervision to promote patient  safety.     Therapist will offer 1-4 individual sessions, 1 therapy group daily, family education, and appropriate referral.

## 2023-08-10 NOTE — DISCHARGE PLACEMENT REQUEST
"Linwood Patel (23 y.o. Male)       Date of Birth   2000    Social Security Number       Address   [address] [place] [address]    Home Phone   [phone]    MRN   [id number]       Jew   None    Marital Status   Single                            Admission Date   23    Admission Type   Emergency    Admitting Provider   Uday Garcia MD    Attending Provider   Jose Bradley MD    Department, Room/Bed   [facility] ADULT PSYCHIATRIC, 1016/2S       Discharge Date       Discharge Disposition       Discharge Destination                                 Attending Provider: Jose Bradley MD    Allergies: Capsicum Annuum Extract & Derivative (Bell Pepper) [Capsicum], Fish-derived Products, Onion, Peanut-containing Drug Products, Tomato    Isolation: None   Infection: None   Code Status: CPR    Ht: 185.4 cm (73\")   Wt: 120 kg (264 lb 3.2 oz)    Admission Cmt: None   Principal Problem: Suicidal ideation [R45.851]                   Active Insurance as of 2023       Primary Coverage       Payor Plan Insurance Group Employer/Plan Group    [facility] COMMUNITY PLAN [facility] COMMUNITY PLAN [facility]       Payor Plan Address Payor Plan Phone Number Payor Plan Fax Number Effective Dates    [address]   2022 - None Entered    [facility] [id number]         Subscriber Name Subscriber Birth Date Member ID       LINWOOD PATEL 2000 [id number]                     Emergency Contacts        (Rel.) Home Phone Work Phone Mobile Phone    (GODMOTHER),GILBERTO (Friend) [phone] -- [phone]    MEGAN VILLASENOR (Friend) [phone] -- [phone]                 History & Physical        Jose Bradley MD at 23 1001                INITIAL PSYCHIATRIC HISTORY & PHYSICAL    Patient Identification:  Name:  Linwood Patel  Age:  23 y.o.  Sex:  male  :  2000  MRN:  [id number]   Visit Number:  [id number]  Primary Care Physician:  Provider, No " Known    SUBJECTIVE    CC/Focus of Exam: SI with plan    HPI: Linwood Patel is a 23 y.o. male who was admitted on 2023 with complaints of depression, SI with a plan. Pt in town for step-mother's .    Patient reports worsening depression, with symptoms of low mood, low energy, low motivation, poor concentration, high anxiety, anhedonia, hopelessness, worthlessness, insomnia, and SI.  Symptoms are severe, persistent, present in multiple settings, worse in the last month, worse by interpersonal stressors, improved by nothing.    Pt reports long history of auditory hallucinations, usually negative & nihilistic. Pt reports girlfriend drowned 8 months ago. Step-mother passed away 4 days ago.     PAST PSYCHIATRIC HX:  Dx: depression, PTSD  IP: two previous admissions to [facility]  OP: none currently  Current meds: denied  Previous meds: lithium, prazosin  SH/SI/SA: denied/intermittent/endorsed  Trauma: endorsed    SUBSTANCE USE HX:  Recent abuse of methamphetamine and cocaine.   Admission UDS +meth    SOCIAL HX:  Currently homeless.   Initially from [place], in [place] after attending rehab. Has children in [place].  Unemployed    FAMILY HX:    History reviewed. No pertinent family history.    Past Medical History:   Diagnosis Date    Anxiety     Bipolar 1 disorder     Depression     PTSD (post-traumatic stress disorder)     Substance abuse        Past Surgical History:   Procedure Laterality Date    KNEE ACL RECONSTRUCTION Right     KNEE ACL RECONSTRUCTION Right 2016       Medications Prior to Admission   Medication Sig Dispense Refill Last Dose    acetaminophen (TYLENOL) 325 MG tablet Take 2 tablets by mouth Every 6 (Six) Hours As Needed for Mild Pain.       albuterol sulfate  (90 Base) MCG/ACT inhaler Inhale 2 puffs Every 4 (Four) Hours As Needed for Wheezing.       melatonin 5 MG tablet tablet Take 1 tablet by mouth At Night As Needed.            ALLERGIES:  Capsicum annuum extract &  derivative (bell pepper) [capsicum], Fish-derived products, Onion, Peanut-containing drug products, and Tomato    Temp:  [96.2 øF (35.7 øC)-98.3 øF (36.8 øC)] 96.2 øF (35.7 øC)  Heart Rate:  [71-94] 71  Resp:  [18-19] 19  BP: (110-145)/(66-87) 119/75    REVIEW OF SYSTEMS:  Review of Systems   Psychiatric/Behavioral:  Positive for dysphoric mood, hallucinations, sleep disturbance and suicidal ideas. The patient is nervous/anxious.       OBJECTIVE    PHYSICAL EXAM:  Physical Exam  Vitals and nursing note reviewed.   Constitutional:       Appearance: He is well-developed.   HENT:      Head: Normocephalic and atraumatic.      Right Ear: External ear normal.      Left Ear: External ear normal.      Nose: Nose normal.   Eyes:      Pupils: Pupils are equal, round, and reactive to light.   Pulmonary:      Effort: Pulmonary effort is normal. No respiratory distress.      Breath sounds: Normal breath sounds.   Abdominal:      General: There is no distension.      Palpations: Abdomen is soft.   Musculoskeletal:         General: No deformity. Normal range of motion.      Cervical back: Normal range of motion and neck supple.   Skin:     General: Skin is warm.      Findings: No rash.   Neurological:      Mental Status: He is alert and oriented to person, place, and time.      Coordination: Coordination normal.       MENTAL STATUS EXAM:   Hygiene:   fair  Cooperation:  Guarded  Eye Contact:  Poor  Psychomotor Behavior:  Slow  Affect:  Restricted  Hopelessness: 9  Speech:  Minimal  Thought Process: Linear  Thought Content:  Normal  Suicidal:  Suicidal Ideation and Suicidal plan  Homicidal:  None  Hallucinations:  Auditory  Delusion:  None  Memory:  Intact  Orientation:  Person, Place, Time, and Situation  Reliability:  poor  Insight:  Poor  Judgment:  Poor  Impulse Control:  Poor      Imaging Results (Last 24 Hours)       ** No results found for the last 24 hours. **             Lab Results   Component Value Date    GLUCOSE 100  (H) 08/08/2023    BUN 11 08/08/2023    CREATININE 0.95 08/08/2023    BCR 11.6 08/08/2023    CO2 23.1 08/08/2023    CALCIUM 9.2 08/08/2023    ALBUMIN 4.2 08/08/2023    AST 37 08/08/2023    ALT 85 (H) 08/08/2023       Lab Results   Component Value Date    WBC 8.62 08/08/2023    HGB 14.5 08/08/2023    HCT 43.6 08/08/2023    MCV 82.4 08/08/2023     08/08/2023       ECG/EMG Results (most recent)       Procedure Component Value Units Date/Time    ECG 12 Lead Other [[id number]] Collected: 08/08/23 0245     Updated: 08/08/23 0258     QT Interval 390 ms      QTC Interval 423 ms     Narrative:      Test Reason : Baseline Cardiac Status~  Blood Pressure :   */*   mmHG  Vent. Rate :  71 BPM     Atrial Rate :  71 BPM     P-R Int : 142 ms          QRS Dur :  86 ms      QT Int : 390 ms       P-R-T Axes :  24  26  29 degrees     QTc Int : 423 ms    Normal sinus rhythm  Normal ECG  No previous ECGs available    Referred By:            Confirmed By:              Brief Urine Lab Results  (Last result in the past 365 days)        Color   Clarity   Blood   Leuk Est   Nitrite   Protein   CREAT   Urine HCG        08/08/23 0027 Dark Yellow   Clear   Negative   Negative   Negative   Negative                   Last Urine Toxicity  More data exists         Latest Ref Rng & Units 8/8/2023 7/13/2023   LAST URINE TOXICITY RESULTS   Amphetamine, Urine Qual Negative Negative  -   Barbiturates Screen, Urine Negative Negative  Absent       Benzodiazepine Screen, Urine Negative Negative  Absent       Buprenorphine, Screen, Urine Negative Negative  -   Cocaine Screen, Urine Negative Negative  -   Fentanyl, Urine Negative Negative  -   Methadone Screen , Urine Negative Negative  -   Methamphetamine, Ur Negative Positive  -      Details          This result is from an external source.               Chart, notes, vitals, labs personally reviewed.  ALT 85  Outside [facility] report requested, reviewed  UDS results: +meth  EKG tracing personally  reviewed, interpreted as normal sinus rhythm, Qtc interval 423  Consulted with patient's therapist regarding clinical history and treatment plan    ASSESSMENT & PLAN:    Suicidal Ideation  -SI with plan  -Admit for crisis stabilization  -SP3    Unspecified bipolar disorder  -Pt previously stable on lithium XR 450mg BID  -We will consider treatment options as indicated  -We will establish outpatient psychiatric care following hospitalization    Post Traumatic Stress Disorder  -Reinstate prazosin 1mg nightly  -OP care as above    Methamphetamine use disorder, severe, dependence  -Admission UDS positive  -Supportive care  -We will refer for substance abuse treatment following hospitalization    The patient has been admitted for safety and stabilization.  Patient will be monitored for suicidality daily and maintained on Special Precautions Level 3 (q15 min checks) .  The patient will have individual and group therapy with a master's level therapist. A master treatment plan will be developed and agreed upon by the patient and his/her treatment team.  The patient's estimated length of stay in the hospital is 5-7 days.       Electronically signed by Jose Bradley MD at 08/08/23 1242       Current Facility-Administered Medications   Medication Dose Route Frequency Provider Last Rate Last Admin    aluminum-magnesium hydroxide-simethicone (MAALOX MAX) 400-400-40 MG/5ML suspension 15 mL  15 mL Oral Q6H PRN Uday Garcia MD   15 mL at 08/08/23 0725    benzonatate (TESSALON) capsule 100 mg  100 mg Oral TID PRN Uday Garcia MD        benztropine (COGENTIN) tablet 2 mg  2 mg Oral Once PRN Uday Garcia MD        Or    benztropine (COGENTIN) injection 1 mg  1 mg Intramuscular Once PRN Uday Garcia MD        famotidine (PEPCID) tablet 20 mg  20 mg Oral BID PRN Uday Garcia MD        FLUoxetine (PROzac) capsule 20 mg  20 mg Oral Daily Jose Bradley MD        hydrOXYzine (ATARAX) tablet 50 mg  50 mg Oral  Q6H PRN Uday Garcia MD        ibuprofen (ADVIL,MOTRIN) tablet 400 mg  400 mg Oral Q6H PRN Uday Garcia MD        loperamide (IMODIUM) capsule 2 mg  2 mg Oral Q2H PRN Uday Garcia MD        magnesium hydroxide (MILK OF MAGNESIA) suspension 10 mL  10 mL Oral Daily PRN Uday Garica MD        ondansetron (ZOFRAN) tablet 4 mg  4 mg Oral Q6H PRN Uday Garcia MD        prazosin (MINIPRESS) capsule 2 mg  2 mg Oral Nightly Jose Bradley MD   2 mg at 23    QUEtiapine (SEROquel) tablet 200 mg  200 mg Oral Nightly Jose Bradley MD        sodium chloride nasal spray 2 spray  2 spray Each Nare PRN Uday Garcia MD        traZODone (DESYREL) tablet 50 mg  50 mg Oral Nightly PRN Uday Garcia MD            Physician Progress Notes (last 24 hours)        Jose Bradley MD at 08/10/23 0942          INPATIENT PSYCHIATRIC PROGRESS NOTE    Name:  Linwood Patel  :  2000  MRN:  [id number]  Visit Number:  [id number]  Length of stay:  2    SUBJECTIVE    CC/Focus of Exam: Depression, SI    INTERVAL HISTORY:  Patient reports mood is improving today, specifically energy, motivation,  hopelessness, SI.  Patient did become irritable with staff this morning because he wanted double portions or nutritional supplementation, somewhat oddly convinced that he is malnourished.  No signs of psychosis otherwise.  Sleep is improving.  Patient reports he previously took Seroquel 300 nightly, never took a morning dose.  Will continue to make adjustments and begin referral process to rehabs.    Depression rating 6/10  Anxiety rating 6/10  Sleep: Improving  Withdrawal sx: Denied  Cravin/10    Review of Systems   Constitutional: Negative.    Respiratory: Negative.     Cardiovascular: Negative.    Gastrointestinal: Negative.    Musculoskeletal: Negative.    Psychiatric/Behavioral:  Positive for dysphoric mood. The patient is nervous/anxious.      OBJECTIVE    Temp:  [97.1 øF (36.2 øC)-98.2 øF (36.8  "øC)] 98.2 øF (36.8 øC)  Heart Rate:  [] 100  Resp:  [18] 18  BP: (107-126)/(66-70) 111/70    MENTAL STATUS EXAM:  Appearance: Casually dressed, fair hygeine.   Cooperation: Cooperative  Psychomotor: Mild psychomotor agitation/retardation, No EPS, No motor tics  Speech: normal rate, amount.  Mood: \"A little better\"   Affect: congruent, restricted  Thought Content: goal directed, no delusional material present  Thought process: linear, organized.  Suicidality: Improving SI  Homicidality: No HI  Perception: No AH/VH  Insight: fair   Judgment: fair    Lab Results (last 24 hours)       ** No results found for the last 24 hours. **               Imaging Results (Last 24 Hours)       ** No results found for the last 24 hours. **               ECG/EMG Results (most recent)       Procedure Component Value Units Date/Time    ECG 12 Lead Other [[id number]] Collected: 08/08/23 0245     Updated: 08/08/23 1827     QT Interval 390 ms      QTC Interval 423 ms     Narrative:      Test Reason : Baseline Cardiac Status~  Blood Pressure :   */*   mmHG  Vent. Rate :  71 BPM     Atrial Rate :  71 BPM     P-R Int : 142 ms          QRS Dur :  86 ms      QT Int : 390 ms       P-R-T Axes :  24  26  29 degrees     QTc Int : 423 ms    Normal sinus rhythm  Normal ECG  No previous ECGs available  Confirmed by Ragini Bush (2004) on 8/8/2023 6:27:25 PM    Referred By:            Confirmed By: Ragini Bush             ALLERGIES: Capsicum annuum extract & derivative (bell pepper) [capsicum], Fish-derived products, Onion, Peanut-containing drug products, and Tomato      Current Facility-Administered Medications:     aluminum-magnesium hydroxide-simethicone (MAALOX MAX) 400-400-40 MG/5ML suspension 15 mL, 15 mL, Oral, Q6H PRN, Uday Garcia MD, 15 mL at 08/08/23 0725    benzonatate (TESSALON) capsule 100 mg, 100 mg, Oral, TID PRN, Uday Garcia MD    benztropine (COGENTIN) tablet 2 mg, 2 mg, Oral, Once PRN **OR** benztropine " (COGENTIN) injection 1 mg, 1 mg, Intramuscular, Once PRN, Uday Garcia MD    famotidine (PEPCID) tablet 20 mg, 20 mg, Oral, BID PRN, Uday Garcia MD    FLUoxetine (PROzac) capsule 20 mg, 20 mg, Oral, Daily, Jose Bradley MD    hydrOXYzine (ATARAX) tablet 50 mg, 50 mg, Oral, Q6H PRN, Uday Garcia MD    ibuprofen (ADVIL,MOTRIN) tablet 400 mg, 400 mg, Oral, Q6H PRN, Uday Garcia MD    loperamide (IMODIUM) capsule 2 mg, 2 mg, Oral, Q2H PRN, Uday Garcia MD    magnesium hydroxide (MILK OF MAGNESIA) suspension 10 mL, 10 mL, Oral, Daily PRN, Uday Garcia MD    ondansetron (ZOFRAN) tablet 4 mg, 4 mg, Oral, Q6H PRN, Uday Garcia MD    prazosin (MINIPRESS) capsule 2 mg, 2 mg, Oral, Nightly, Jose Bradley MD, 2 mg at 08/09/23 2051    sodium chloride nasal spray 2 spray, 2 spray, Each Nare, PRN, Uday Garcia MD    traZODone (DESYREL) tablet 50 mg, 50 mg, Oral, Nightly PRN, Uday Garcia MD    Reviewed chart, notes, vitals, labs and EKG personally reviewed.    ASSESSMENT & PLAN:      Suicidal Ideation  -SI with plan  -Admit for crisis stabilization  -SP3     Unspecified depressive disorder  -Pt previously stable on lithium XR 450mg BID.  He reports it was ineffective  -Began fluoxetine 20 mg daily on 8/9/2023  -Increase Seroquel to 200 mg nightly.  Patient reports previous success on 300 mg nightly  -We will establish outpatient psychiatric care following hospitalization     Post Traumatic Stress Disorder  -Increased prazosin to 2 mg nightly  -OP care as above     Methamphetamine use disorder, severe, dependence  -Admission UDS positive  -Supportive care  -We will refer for substance abuse treatment following hospitalization     The patient has been admitted for safety and stabilization.  Patient will be monitored for suicidality daily and maintained on Special Precautions Level 3 (q15 min checks) .  The patient will have individual and group therapy with a master's level  "therapist. A master treatment plan will be developed and agreed upon by the patient and his/her treatment team.  The patient's estimated length of stay in the hospital is 4-5 days.     Special precautions: Special Precautions Level 3 (q15 min checks)     Behavioral Health Treatment Plan and Problem List: I have reviewed and approved the Behavioral Health Treatment Plan and Problem list.  The patient has had a chance to review and agrees with the treatment plan.     Clinician:  Jose Bradley MD  08/10/23  09:42 EDT      Electronically signed by Jose Bradley MD at 08/10/23 0944       Jose Bradley MD at 23 1308          INPATIENT PSYCHIATRIC PROGRESS NOTE    Name:  Linwood Patel  :  2000  MRN:  [id number]  Visit Number:  [id number]  Length of stay:  1    SUBJECTIVE    CC/Focus of Exam: Depression, SI    INTERVAL HISTORY:  Patient reports no significant improvement today, with ongoing depression, low mood, low motivation, low energy, hopelessness, SI.  Patient reports insomnia, specifically with sleep maintenance.  He reports previously prescribed lithium was not helpful, but believes fluoxetine has been at some point.    Depression rating 8/10  Anxiety rating 7/10  Sleep: Poor  Withdrawal sx: Denied  Cravin/10    Review of Systems   Constitutional: Negative.  Positive for fatigue.   Respiratory: Negative.     Cardiovascular: Negative.    Gastrointestinal: Negative.    Musculoskeletal: Negative.    Psychiatric/Behavioral:  Positive for dysphoric mood and suicidal ideas. The patient is nervous/anxious.      OBJECTIVE    Temp:  [96.4 øF (35.8 øC)-97.9 øF (36.6 øC)] 97.9 øF (36.6 øC)  Heart Rate:  [] 60  Resp:  [16-18] 16  BP: (110-141)/(51-73) 141/73    MENTAL STATUS EXAM:  Appearance: Casually dressed, fair hygeine.   Cooperation: Guarded  Psychomotor: Mild psychomotor agitation/retardation, No EPS, No motor tics  Speech: normal rate, amount.  Mood: \"Same\"   Affect: congruent, " restricted  Thought Content: goal directed, no delusional material present  Thought process: linear, organized.  Suicidality: + SI  Homicidality: No HI  Perception: No AH/VH  Insight: fair   Judgment: fair    Lab Results (last 24 hours)       ** No results found for the last 24 hours. **               Imaging Results (Last 24 Hours)       ** No results found for the last 24 hours. **               ECG/EMG Results (most recent)       Procedure Component Value Units Date/Time    ECG 12 Lead Other [[id number]] Collected: 08/08/23 0245     Updated: 08/08/23 1827     QT Interval 390 ms      QTC Interval 423 ms     Narrative:      Test Reason : Baseline Cardiac Status~  Blood Pressure :   */*   mmHG  Vent. Rate :  71 BPM     Atrial Rate :  71 BPM     P-R Int : 142 ms          QRS Dur :  86 ms      QT Int : 390 ms       P-R-T Axes :  24  26  29 degrees     QTc Int : 423 ms    Normal sinus rhythm  Normal ECG  No previous ECGs available  Confirmed by Ragini Bush (2004) on 8/8/2023 6:27:25 PM    Referred By:            Confirmed By: Ragini Bush             ALLERGIES: Capsicum annuum extract & derivative (bell pepper) [capsicum], Fish-derived products, Onion, Peanut-containing drug products, and Tomato      Current Facility-Administered Medications:     aluminum-magnesium hydroxide-simethicone (MAALOX MAX) 400-400-40 MG/5ML suspension 15 mL, 15 mL, Oral, Q6H PRN, Uday Garcia MD, 15 mL at 08/08/23 0725    benzonatate (TESSALON) capsule 100 mg, 100 mg, Oral, TID PRN, Uday Garcia MD    benztropine (COGENTIN) tablet 2 mg, 2 mg, Oral, Once PRN **OR** benztropine (COGENTIN) injection 1 mg, 1 mg, Intramuscular, Once PRN, Uday Garcia MD    famotidine (PEPCID) tablet 20 mg, 20 mg, Oral, BID PRN, Uday Garcia MD    hydrOXYzine (ATARAX) tablet 50 mg, 50 mg, Oral, Q6H PRN, Uday Gacria MD    ibuprofen (ADVIL,MOTRIN) tablet 400 mg, 400 mg, Oral, Q6H PRN, Uday Garcia MD    loperamide (IMODIUM)  capsule 2 mg, 2 mg, Oral, Q2H PRN, Uday Garcia MD    magnesium hydroxide (MILK OF MAGNESIA) suspension 10 mL, 10 mL, Oral, Daily PRN, Uday Garcia MD    ondansetron (ZOFRAN) tablet 4 mg, 4 mg, Oral, Q6H PRN, Uday Garcia MD    prazosin (MINIPRESS) capsule 1 mg, 1 mg, Oral, Nightly, Jose Bradley MD, 1 mg at 08/08/23 2156    QUEtiapine (SEROquel) tablet 200 mg, 200 mg, Oral, BID, Jose Bradley MD, 200 mg at 08/08/23 2156    sodium chloride nasal spray 2 spray, 2 spray, Each Nare, PRN, Uday Garcia MD    traZODone (DESYREL) tablet 50 mg, 50 mg, Oral, Nightly PRN, Uday Garcia MD    Reviewed chart, notes, vitals, labs and EKG personally reviewed.    ASSESSMENT & PLAN:      Suicidal Ideation  -SI with plan  -Admit for crisis stabilization  -SP3     Unspecified depressive disorder  -Pt previously stable on lithium XR 450mg BID.  He reports it was ineffective  -Begin fluoxetine 20 mg daily  -We will establish outpatient psychiatric care following hospitalization     Post Traumatic Stress Disorder  -Increase prazosin to 2 mg nightly  -OP care as above     Methamphetamine use disorder, severe, dependence  -Admission UDS positive  -Supportive care  -We will refer for substance abuse treatment following hospitalization     The patient has been admitted for safety and stabilization.  Patient will be monitored for suicidality daily and maintained on Special Precautions Level 3 (q15 min checks) .  The patient will have individual and group therapy with a master's level therapist. A master treatment plan will be developed and agreed upon by the patient and his/her treatment team.  The patient's estimated length of stay in the hospital is 5-7 days.     Special precautions: Special Precautions Level 3 (q15 min checks)     Behavioral Health Treatment Plan and Problem List: I have reviewed and approved the Behavioral Health Treatment Plan and Problem list.  The patient has had a chance to review and  agrees with the treatment plan.     Clinician:  Jose Bradley MD  08/09/23  13:08 EDT      Electronically signed by Jose Bradley MD at 08/09/23 1690

## 2023-08-10 NOTE — PLAN OF CARE
Problem: Adult Behavioral Health Plan of Care  Goal: Plan of Care Review  Outcome: Ongoing, Progressing  Flowsheets  Taken 8/10/2023 0023  Outcome Evaluation: Pt rated anxiety 7/10, depression 7/10, Denies SI/HI/AVH. Reports feeling helpless, hopeless, worthless and powerless. Reports good appetite and poor sleep.  Taken 8/9/2023 1931  Plan of Care Reviewed With: patient  Patient Agreement with Plan of Care: agrees  Goal: Patient-Specific Goal (Individualization)  Outcome: Ongoing, Progressing  Goal: Adheres to Safety Considerations for Self and Others  Outcome: Ongoing, Progressing  Intervention: Develop and Maintain Individualized Safety Plan  Recent Flowsheet Documentation  Taken 8/9/2023 2200 by Fransisca Mata RN  Safety Measures: safety rounds completed  Taken 8/9/2023 2000 by Fransisca Mata RN  Safety Measures: safety rounds completed  Taken 8/9/2023 1931 by Fransisca Mata RN  Safety Measures: safety rounds completed  Goal: Absence of New-Onset Illness or Injury  Outcome: Ongoing, Progressing  Intervention: Identify and Manage Fall Risk  Recent Flowsheet Documentation  Taken 8/9/2023 2200 by Fransisca Mata RN  Safety Measures: safety rounds completed  Taken 8/9/2023 2000 by Fransisca Mata RN  Safety Measures: safety rounds completed  Taken 8/9/2023 1931 by Fransisca Mata RN  Safety Measures: safety rounds completed  Goal: Optimized Coping Skills in Response to Life Stressors  Outcome: Ongoing, Progressing  Goal: Develops/Participates in Therapeutic Sundown to Support Successful Transition  Outcome: Ongoing, Progressing  Intervention: Foster Therapeutic Sundown  Recent Flowsheet Documentation  Taken 8/9/2023 1931 by Fransisca Mata RN  Trust Relationship/Rapport:   care explained   thoughts/feelings acknowledged   reassurance provided   questions encouraged   questions answered     Problem: Fall Injury Risk  Goal: Absence of Fall and Fall-Related Injury  Outcome: Ongoing, Progressing  Intervention: Identify  and Manage Contributors  Recent Flowsheet Documentation  Taken 8/9/2023 1931 by Fransisca Mata RN  Medication Review/Management: medications reviewed  Intervention: Promote Injury-Free Environment  Recent Flowsheet Documentation  Taken 8/9/2023 2200 by Fransisca Mata RN  Safety Promotion/Fall Prevention:   safety round/check completed   fall prevention program maintained   clutter free environment maintained   nonskid shoes/slippers when out of bed  Taken 8/9/2023 2000 by Fransisca Mata RN  Safety Promotion/Fall Prevention:   fall prevention program maintained   safety round/check completed   nonskid shoes/slippers when out of bed   clutter free environment maintained  Taken 8/9/2023 1931 by Fransisca Mata RN  Safety Promotion/Fall Prevention:   safety round/check completed   nonskid shoes/slippers when out of bed   fall prevention program maintained   clutter free environment maintained     Problem: Suicidal Behavior  Goal: Suicidal Behavior is Absent or Managed  Outcome: Ongoing, Progressing     Problem: Activity and Energy Impairment (Depressive Signs/Symptoms)  Goal: Optimized Energy Level (Depressive Signs/Symptoms)  Outcome: Ongoing, Progressing  Intervention: Optimize Energy Level  Recent Flowsheet Documentation  Taken 8/9/2023 1931 by Fransisca Mata RN  Activity (Behavioral Health): up ad donovan  Diversional Activity:   art work   television   journaling   reading   puzzles     Problem: Cognitive Impairment (Depressive Signs/Symptoms)  Goal: Optimized Cognitive Function  Outcome: Ongoing, Progressing     Problem: Decreased Participation/Engagement (Depressive Signs/Symptoms)  Goal: Increased Participation and Engagement (Depressive Signs/Symptoms)  Outcome: Ongoing, Progressing  Intervention: Facilitate Participation and Engagement  Recent Flowsheet Documentation  Taken 8/9/2023 1931 by Fransisca Mata RN  Diversional Activity:   art work   television   journaling   reading   puzzles     Problem: Feelings of  Worthlessness, Hopelessness or Excessive Guilt (Depressive Signs/Symptoms)  Goal: Enhanced Self-Esteem and Confidence (Depressive Signs/Symptoms)  Outcome: Ongoing, Progressing     Problem: Mood Impairment (Depressive Signs/Symptoms)  Goal: Improved Mood Symptoms (Depressive Signs/Symptoms)  Outcome: Ongoing, Progressing  Intervention: Promote Mood Improvement  Recent Flowsheet Documentation  Taken 8/9/2023 1931 by Fransisca Mata RN  Diversional Activity:   art work   television   journaling   reading   puzzles     Problem: Nutrition Imbalance (Depressive Signs/Symptoms)  Goal: Optimized Nutrition Intake  Outcome: Ongoing, Progressing     Problem: Psychomotor Impairment (Depressive Signs/Symptoms)  Goal: Improved Psychomotor Symptoms (Depressive Signs/Symptoms)  Outcome: Ongoing, Progressing  Intervention: Manage Psychomotor Movement  Recent Flowsheet Documentation  Taken 8/9/2023 1931 by Fransisca Mata RN  Activity (Behavioral Health): up ad donovan  Diversional Activity:   art work   television   journaling   reading   puzzles     Problem: Sleep Disturbance (Depressive Signs/Symptoms)  Goal: Improved Sleep (Depressive Signs/Symptoms)  Outcome: Ongoing, Progressing  Intervention: Promote Healthy Sleep Hygiene  Recent Flowsheet Documentation  Taken 8/9/2023 1931 by Fransisca Mata RN  Sleep Hygiene Promotion: awakenings minimized     Problem: Social, Occupational or Functional Impairment (Depressive Signs/Symptoms)  Goal: Enhanced Social, Occupational or Functional Skills (Depressive Signs/Symptoms)  Outcome: Ongoing, Progressing  Intervention: Promote Social, Occupational and Functional Ability  Recent Flowsheet Documentation  Taken 8/9/2023 1931 by Fransisca Mata RN  Trust Relationship/Rapport:   care explained   thoughts/feelings acknowledged   reassurance provided   questions encouraged   questions answered     Problem: Activity and Energy Impairment (Anxiety Signs/Symptoms)  Goal: Optimized Energy Level (Anxiety  Signs/Symptoms)  Outcome: Ongoing, Progressing  Intervention: Optimize Energy Level  Recent Flowsheet Documentation  Taken 8/9/2023 1931 by Fransisca Mata RN  Activity (Behavioral Health): up ad donovan  Diversional Activity:   art work   television   journaling   reading   puzzles     Problem: Cognitive Impairment (Anxiety Signs/Symptoms)  Goal: Optimized Cognitive Function (Anxiety Signs/Symptoms)  Outcome: Ongoing, Progressing     Problem: Mood Impairment (Anxiety Signs/Symptoms)  Goal: Improved Mood Symptoms (Anxiety Signs/Symptoms)  Outcome: Ongoing, Progressing     Problem: Sleep Impairment (Anxiety Signs/Symptoms)  Goal: Improved Sleep (Anxiety Signs/Symptoms)  Outcome: Ongoing, Progressing  Intervention: Promote Healthy Sleep Hygiene  Recent Flowsheet Documentation  Taken 8/9/2023 1931 by Fransisca Mata RN  Sleep Hygiene Promotion: awakenings minimized     Problem: Social, Occupational or Functional Impairment (Anxiety Signs/Symptoms)  Goal: Enhanced Social, Occupational or Functional Skills (Anxiety Signs/Symptoms)  Outcome: Ongoing, Progressing  Intervention: Promote Social, Occupational and Functional Ability  Recent Flowsheet Documentation  Taken 8/9/2023 1931 by Fransisca Mata RN  Trust Relationship/Rapport:   care explained   thoughts/feelings acknowledged   reassurance provided   questions encouraged   questions answered     Problem: Somatic Disturbance (Anxiety Signs/Symptoms)  Goal: Improved Somatic Symptoms (Anxiety Signs/Symptoms)  Outcome: Ongoing, Progressing     Problem: Adult Inpatient Plan of Care  Goal: Plan of Care Review  Outcome: Ongoing, Progressing  Flowsheets  Taken 8/10/2023 0023  Outcome Evaluation: Pt rated anxiety 7/10, depression 7/10, Denies SI/HI/AVH. Reports feeling helpless, hopeless, worthless and powerless. Reports good appetite and poor sleep.  Taken 8/9/2023 1931  Plan of Care Reviewed With: patient  Goal: Patient-Specific Goal (Individualized)  Outcome: Ongoing,  Progressing  Goal: Absence of Hospital-Acquired Illness or Injury  Outcome: Ongoing, Progressing  Intervention: Identify and Manage Fall Risk  Recent Flowsheet Documentation  Taken 8/9/2023 2200 by Fransisca Mata RN  Safety Promotion/Fall Prevention:   safety round/check completed   fall prevention program maintained   clutter free environment maintained   nonskid shoes/slippers when out of bed  Taken 8/9/2023 2000 by Fransisca Mata RN  Safety Promotion/Fall Prevention:   fall prevention program maintained   safety round/check completed   nonskid shoes/slippers when out of bed   clutter free environment maintained  Taken 8/9/2023 1931 by Fransisca Mata RN  Safety Promotion/Fall Prevention:   safety round/check completed   nonskid shoes/slippers when out of bed   fall prevention program maintained   clutter free environment maintained  Goal: Optimal Comfort and Wellbeing  Outcome: Ongoing, Progressing  Intervention: Provide Person-Centered Care  Recent Flowsheet Documentation  Taken 8/9/2023 1931 by Fransisca Mata RN  Trust Relationship/Rapport:   care explained   thoughts/feelings acknowledged   reassurance provided   questions encouraged   questions answered  Goal: Readiness for Transition of Care  Outcome: Ongoing, Progressing     Problem: Activity and Energy Impairment (Excessive Substance Use)  Goal: Optimized Energy Level (Excessive Substance Use)  Outcome: Ongoing, Progressing  Intervention: Optimize Energy Level  Recent Flowsheet Documentation  Taken 8/9/2023 1931 by Fransisca Mata RN  Activity (Behavioral Health): up ad donovan  Diversional Activity:   art work   television   journaling   reading   puzzles     Problem: Behavior Regulation Impairment (Excessive Substance Use)  Goal: Improved Behavioral Control (Excessive Substance Use)  Outcome: Ongoing, Progressing     Problem: Decreased Participation and Engagement (Excessive Substance Use)  Goal: Increased Participation and Engagement (Excessive Substance  Use)  Outcome: Ongoing, Progressing  Intervention: Facilitate Participation and Engagement  Recent Flowsheet Documentation  Taken 8/9/2023 1931 by Fransisca Mata RN  Diversional Activity:   art work   television   journaling   reading   puzzles     Problem: Physiologic Impairment (Excessive Substance Use)  Goal: Improved Physiologic Symptoms (Excessive Substance Use)  Outcome: Ongoing, Progressing     Problem: Social, Occupational or Functional Impairment (Excessive Substance Use)  Goal: Enhanced Social, Occupational or Functional Skills (Excessive Substance Use)  Outcome: Ongoing, Progressing  Intervention: Promote Social, Occupational and Functional Ability  Recent Flowsheet Documentation  Taken 8/9/2023 1931 by Fransisca Mata RN  Trust Relationship/Rapport:   care explained   thoughts/feelings acknowledged   reassurance provided   questions encouraged   questions answered     Problem: Suicide Risk  Goal: Absence of Self-Harm  Outcome: Ongoing, Progressing   Goal Outcome Evaluation:  Plan of Care Reviewed With: patient  Patient Agreement with Plan of Care: agrees        Outcome Evaluation: Pt rated anxiety 7/10, depression 7/10, Denies SI/HI/AVH. Reports feeling helpless, hopeless, worthless and powerless. Reports good appetite and poor sleep.

## 2023-08-10 NOTE — PLAN OF CARE
Goal Outcome Evaluation:  Plan of Care Reviewed With: patient  Patient Agreement with Plan of Care: agrees     Progress: no change  Outcome Evaluation: Pt has went back and forth between room and day room. Pt rates anxiety 7, depression 7, states sleep and appetite are okay, denies SI/HI/AVH. Pt is irritable at times throughout the shift, but is redirectable.

## 2023-08-10 NOTE — PROGRESS NOTES
"INPATIENT PSYCHIATRIC PROGRESS NOTE    Name:  Linwood Patel  :  2000  MRN:  [id number]  Visit Number:  [id number]  Length of stay:  2    SUBJECTIVE    CC/Focus of Exam: Depression, SI    INTERVAL HISTORY:  Patient reports mood is improving today, specifically energy, motivation,  hopelessness, SI.  Patient did become irritable with staff this morning because he wanted double portions or nutritional supplementation, somewhat oddly convinced that he is malnourished.  No signs of psychosis otherwise.  Sleep is improving.  Patient reports he previously took Seroquel 300 nightly, never took a morning dose.  Will continue to make adjustments and begin referral process to rehabs.    Depression rating 10  Anxiety rating 6/10  Sleep: Improving  Withdrawal sx: Denied  Cravin10    Review of Systems   Constitutional: Negative.    Respiratory: Negative.     Cardiovascular: Negative.    Gastrointestinal: Negative.    Musculoskeletal: Negative.    Psychiatric/Behavioral:  Positive for dysphoric mood. The patient is nervous/anxious.      OBJECTIVE    Temp:  [97.1 øF (36.2 øC)-98.2 øF (36.8 øC)] 98.2 øF (36.8 øC)  Heart Rate:  [] 100  Resp:  [18] 18  BP: (107-126)/(66-70) 111/70    MENTAL STATUS EXAM:  Appearance: Casually dressed, fair hygeine.   Cooperation: Cooperative  Psychomotor: Mild psychomotor agitation/retardation, No EPS, No motor tics  Speech: normal rate, amount.  Mood: \"A little better\"   Affect: congruent, restricted  Thought Content: goal directed, no delusional material present  Thought process: linear, organized.  Suicidality: Improving SI  Homicidality: No HI  Perception: No AH/VH  Insight: fair   Judgment: fair    Lab Results (last 24 hours)       ** No results found for the last 24 hours. **               Imaging Results (Last 24 Hours)       ** No results found for the last 24 hours. **               ECG/EMG Results (most recent)       Procedure Component Value Units Date/Time    ECG 12 Lead " Other [[id number]] Collected: 08/08/23 0245     Updated: 08/08/23 1827     QT Interval 390 ms      QTC Interval 423 ms     Narrative:      Test Reason : Baseline Cardiac Status~  Blood Pressure :   */*   mmHG  Vent. Rate :  71 BPM     Atrial Rate :  71 BPM     P-R Int : 142 ms          QRS Dur :  86 ms      QT Int : 390 ms       P-R-T Axes :  24  26  29 degrees     QTc Int : 423 ms    Normal sinus rhythm  Normal ECG  No previous ECGs available  Confirmed by Ragini Bush ([id number]) on 8/8/2023 6:27:25 PM    Referred By:            Confirmed By: Ragini Bush             ALLERGIES: Capsicum annuum extract & derivative (bell pepper) [capsicum], Fish-derived products, Onion, Peanut-containing drug products, and Tomato      Current Facility-Administered Medications:     aluminum-magnesium hydroxide-simethicone (MAALOX MAX) 400-400-40 MG/5ML suspension 15 mL, 15 mL, Oral, Q6H PRN, Uday Garcia MD, 15 mL at 08/08/23 0725    benzonatate (TESSALON) capsule 100 mg, 100 mg, Oral, TID PRN, Uday Garcia MD    benztropine (COGENTIN) tablet 2 mg, 2 mg, Oral, Once PRN **OR** benztropine (COGENTIN) injection 1 mg, 1 mg, Intramuscular, Once PRN, Uday Garcia MD    famotidine (PEPCID) tablet 20 mg, 20 mg, Oral, BID PRN, Uday Garcia MD    FLUoxetine (PROzac) capsule 20 mg, 20 mg, Oral, Daily, Jose Bradley MD    hydrOXYzine (ATARAX) tablet 50 mg, 50 mg, Oral, Q6H PRN, Uday Garcia MD    ibuprofen (ADVIL,MOTRIN) tablet 400 mg, 400 mg, Oral, Q6H PRN, Uday Garcia MD    loperamide (IMODIUM) capsule 2 mg, 2 mg, Oral, Q2H PRN, Uday Garcia MD    magnesium hydroxide (MILK OF MAGNESIA) suspension 10 mL, 10 mL, Oral, Daily PRN, Uday Garcia MD    ondansetron (ZOFRAN) tablet 4 mg, 4 mg, Oral, Q6H PRN, Uday Garcia MD    prazosin (MINIPRESS) capsule 2 mg, 2 mg, Oral, Nightly, Jose Bradley MD, 2 mg at 08/09/23 2051    sodium chloride nasal spray 2 spray, 2 spray, Each Nare, PRN, Radha,  Uday SARAH MD    traZODone (DESYREL) tablet 50 mg, 50 mg, Oral, Nightly PRN, Uday Garcia MD    Reviewed chart, notes, vitals, labs and EKG personally reviewed.    ASSESSMENT & PLAN:      Suicidal Ideation  -SI with plan  -Admit for crisis stabilization  -SP3     Unspecified depressive disorder  -Pt previously stable on lithium XR 450mg BID.  He reports it was ineffective  -Began fluoxetine 20 mg daily on 8/9/2023  -Increase Seroquel to 200 mg nightly.  Patient reports previous success on 300 mg nightly  -We will establish outpatient psychiatric care following hospitalization     Post Traumatic Stress Disorder  -Increased prazosin to 2 mg nightly  -OP care as above     Methamphetamine use disorder, severe, dependence  -Admission UDS positive  -Supportive care  -We will refer for substance abuse treatment following hospitalization     The patient has been admitted for safety and stabilization.  Patient will be monitored for suicidality daily and maintained on Special Precautions Level 3 (q15 min checks) .  The patient will have individual and group therapy with a master's level therapist. A master treatment plan will be developed and agreed upon by the patient and his/her treatment team.  The patient's estimated length of stay in the hospital is 4-5 days.     Special precautions: Special Precautions Level 3 (q15 min checks)     Behavioral Health Treatment Plan and Problem List: I have reviewed and approved the Behavioral Health Treatment Plan and Problem list.  The patient has had a chance to review and agrees with the treatment plan.     Clinician:  Jose Bradley MD  08/10/23  09:42 EDT

## 2023-08-11 PROCEDURE — 99232 SBSQ HOSP IP/OBS MODERATE 35: CPT | Performed by: PSYCHIATRY & NEUROLOGY

## 2023-08-11 RX ADMIN — ALUMINUM HYDROXIDE, MAGNESIUM HYDROXIDE, AND DIMETHICONE 15 ML: 400; 400; 40 SUSPENSION ORAL at 22:37

## 2023-08-11 RX ADMIN — FLUOXETINE HYDROCHLORIDE 20 MG: 20 CAPSULE ORAL at 09:23

## 2023-08-11 RX ADMIN — QUETIAPINE FUMARATE 200 MG: 100 TABLET ORAL at 21:56

## 2023-08-11 RX ADMIN — PRAZOSIN HYDROCHLORIDE 2 MG: 1 CAPSULE ORAL at 21:56

## 2023-08-11 NOTE — PLAN OF CARE
Problem: Adult Behavioral Health Plan of Care  Goal: Plan of Care Review  Outcome: Ongoing, Progressing  Flowsheets  Taken 8/10/2023 2349  Outcome Evaluation: Pt rated anxiety 0/10, depression 0/10, Denies SI/Hi. Reports AVH, sees shadow people, and hears whispering. Reports good appetite and poor sleep.  Taken 8/10/2023 1916  Plan of Care Reviewed With: patient  Patient Agreement with Plan of Care: agrees  Goal: Patient-Specific Goal (Individualization)  Outcome: Ongoing, Progressing  Goal: Adheres to Safety Considerations for Self and Others  Outcome: Ongoing, Progressing  Intervention: Develop and Maintain Individualized Safety Plan  Recent Flowsheet Documentation  Taken 8/10/2023 2200 by Fransisca Mata RN  Safety Measures: safety rounds completed  Taken 8/10/2023 2000 by Fransisca Mata RN  Safety Measures: safety rounds completed  Taken 8/10/2023 1916 by Fransisca Mata RN  Safety Measures: safety rounds completed  Goal: Absence of New-Onset Illness or Injury  Outcome: Ongoing, Progressing  Intervention: Identify and Manage Fall Risk  Recent Flowsheet Documentation  Taken 8/10/2023 2200 by Fransisca Mata RN  Safety Measures: safety rounds completed  Taken 8/10/2023 2000 by Fransisca Mata RN  Safety Measures: safety rounds completed  Taken 8/10/2023 1916 by Fransisca Mata RN  Safety Measures: safety rounds completed  Goal: Optimized Coping Skills in Response to Life Stressors  Outcome: Ongoing, Progressing  Goal: Develops/Participates in Therapeutic [place] to Support Successful Transition  Outcome: Ongoing, Progressing  Intervention: Foster Therapeutic [place]  Recent Flowsheet Documentation  Taken 8/10/2023 1916 by Fransisca Mata RN  Trust Relationship/Rapport: care explained     Problem: Fall Injury Risk  Goal: Absence of Fall and Fall-Related Injury  Outcome: Ongoing, Progressing  Intervention: Identify and Manage Contributors  Recent Flowsheet Documentation  Taken 8/10/2023 1916 by Fransisca Mata  RN  Medication Review/Management: medications reviewed  Intervention: Promote Injury-Free Environment  Recent Flowsheet Documentation  Taken 8/10/2023 2200 by Fransisca Mata RN  Safety Promotion/Fall Prevention:   safety round/check completed   fall prevention program maintained   clutter free environment maintained   nonskid shoes/slippers when out of bed  Taken 8/10/2023 2000 by Fransisca Mata RN  Safety Promotion/Fall Prevention:   safety round/check completed   fall prevention program maintained   clutter free environment maintained   nonskid shoes/slippers when out of bed  Taken 8/10/2023 1916 by Fransisca Mata RN  Safety Promotion/Fall Prevention:   safety round/check completed   fall prevention program maintained   clutter free environment maintained   nonskid shoes/slippers when out of bed     Problem: Suicidal Behavior  Goal: Suicidal Behavior is Absent or Managed  Outcome: Ongoing, Progressing     Problem: Activity and Energy Impairment (Depressive Signs/Symptoms)  Goal: Optimized Energy Level (Depressive Signs/Symptoms)  Outcome: Ongoing, Progressing  Intervention: Optimize Energy Level  Recent Flowsheet Documentation  Taken 8/10/2023 1916 by Fransisca Mata RN  Activity (Behavioral Health): up ad donovan  Diversional Activity:   art work   [employer]   television   puzzles     Problem: Cognitive Impairment (Depressive Signs/Symptoms)  Goal: Optimized Cognitive Function  Outcome: Ongoing, Progressing     Problem: Decreased Participation/Engagement (Depressive Signs/Symptoms)  Goal: Increased Participation and Engagement (Depressive Signs/Symptoms)  Outcome: Ongoing, Progressing  Intervention: Facilitate Participation and Engagement  Recent Flowsheet Documentation  Taken 8/10/2023 1916 by Fransisca Mata RN  Diversional Activity:   art work   journaling   television   puzzles     Problem: Feelings of Worthlessness, Hopelessness or Excessive Guilt (Depressive Signs/Symptoms)  Goal: Enhanced Self-Esteem and  Confidence (Depressive Signs/Symptoms)  Outcome: Ongoing, Progressing     Problem: Mood Impairment (Depressive Signs/Symptoms)  Goal: Improved Mood Symptoms (Depressive Signs/Symptoms)  Outcome: Ongoing, Progressing  Intervention: Promote Mood Improvement  Recent Flowsheet Documentation  Taken 8/10/2023 1916 by Fransisca Mata RN  Diversional Activity:   art work   [employer]   [employer]     Problem: Nutrition Imbalance (Depressive Signs/Symptoms)  Goal: Optimized Nutrition Intake  Outcome: Ongoing, Progressing     Problem: Psychomotor Impairment (Depressive Signs/Symptoms)  Goal: Improved Psychomotor Symptoms (Depressive Signs/Symptoms)  Outcome: Ongoing, Progressing  Intervention: Manage Psychomotor Movement  Recent Flowsheet Documentation  Taken 8/10/2023 1916 by Fransisca Mata RN  Activity (Behavioral Health): up ad donovan  Diversional Activity:   art work   [employer]ing   television   puzzles     Problem: Sleep Disturbance (Depressive Signs/Symptoms)  Goal: Improved Sleep (Depressive Signs/Symptoms)  Outcome: Ongoing, Progressing  Intervention: Promote Healthy Sleep Hygiene  Recent Flowsheet Documentation  Taken 8/10/2023 1916 by Fransisca Mata RN  Sleep Hygiene Promotion:   awakenings minimized   regular sleep pattern promoted   relaxation techniques promoted     Problem: Social, Occupational or Functional Impairment (Depressive Signs/Symptoms)  Goal: Enhanced Social, Occupational or Functional Skills (Depressive Signs/Symptoms)  Outcome: Ongoing, Progressing  Intervention: Promote Social, Occupational and Functional Ability  Recent Flowsheet Documentation  Taken 8/10/2023 1916 by Fransisca Mata RN  Trust Relationship/Rapport: care explained     Problem: Activity and Energy Impairment (Anxiety Signs/Symptoms)  Goal: Optimized Energy Level (Anxiety Signs/Symptoms)  Outcome: Ongoing, Progressing  Intervention: Optimize Energy Level  Recent Flowsheet Documentation  Taken 8/10/2023 1916 by Fransisca Mata  RN  Activity (Behavioral Health): up ad donovan  Diversional Activity:   art work   journaling   television   puzzles     Problem: Cognitive Impairment (Anxiety Signs/Symptoms)  Goal: Optimized Cognitive Function (Anxiety Signs/Symptoms)  Outcome: Ongoing, Progressing     Problem: Mood Impairment (Anxiety Signs/Symptoms)  Goal: Improved Mood Symptoms (Anxiety Signs/Symptoms)  Outcome: Ongoing, Progressing     Problem: Sleep Impairment (Anxiety Signs/Symptoms)  Goal: Improved Sleep (Anxiety Signs/Symptoms)  Outcome: Ongoing, Progressing  Intervention: Promote Healthy Sleep Hygiene  Recent Flowsheet Documentation  Taken 8/10/2023 1916 by Fransisca Mata RN  Sleep Hygiene Promotion:   awakenings minimized   regular sleep pattern promoted   relaxation techniques promoted     Problem: Social, Occupational or Functional Impairment (Anxiety Signs/Symptoms)  Goal: Enhanced Social, Occupational or Functional Skills (Anxiety Signs/Symptoms)  Outcome: Ongoing, Progressing  Intervention: Promote Social, Occupational and Functional Ability  Recent Flowsheet Documentation  Taken 8/10/2023 1916 by Fransisca Mata RN  Trust Relationship/Rapport: care explained     Problem: Somatic Disturbance (Anxiety Signs/Symptoms)  Goal: Improved Somatic Symptoms (Anxiety Signs/Symptoms)  Outcome: Ongoing, Progressing     Problem: Adult Inpatient Plan of Care  Goal: Plan of Care Review  Outcome: Ongoing, Progressing  Flowsheets  Taken 8/10/2023 2349  Outcome Evaluation: Pt rated anxiety 0/10, depression 0/10, Denies SI/Hi. Reports AVH, sees shadow people, and hears whispering. Reports good appetite and poor sleep.  Taken 8/10/2023 1916  Plan of Care Reviewed With: patient  Goal: Patient-Specific Goal (Individualized)  Outcome: Ongoing, Progressing  Goal: Absence of Hospital-Acquired Illness or Injury  Outcome: Ongoing, Progressing  Intervention: Identify and Manage Fall Risk  Recent Flowsheet Documentation  Taken 8/10/2023 2200 by Fransisca Mata  RN  Safety Promotion/Fall Prevention:   safety round/check completed   fall prevention program maintained   clutter free environment maintained   nonskid shoes/slippers when out of bed  Taken 8/10/2023 2000 by Fransisca Mata RN  Safety Promotion/Fall Prevention:   safety round/check completed   fall prevention program maintained   clutter free environment maintained   nonskid shoes/slippers when out of bed  Taken 8/10/2023 1916 by Fransisca Mata RN  Safety Promotion/Fall Prevention:   safety round/check completed   fall prevention program maintained   clutter free environment maintained   nonskid shoes/slippers when out of bed  Goal: Optimal Comfort and Wellbeing  Outcome: Ongoing, Progressing  Intervention: Provide Person-Centered Care  Recent Flowsheet Documentation  Taken 8/10/2023 1916 by Fransisca Mata RN  Trust Relationship/Rapport: care explained  Goal: Readiness for Transition of Care  Outcome: Ongoing, Progressing     Problem: Activity and Energy Impairment (Excessive Substance Use)  Goal: Optimized Energy Level (Excessive Substance Use)  Outcome: Ongoing, Progressing  Intervention: Optimize Energy Level  Recent Flowsheet Documentation  Taken 8/10/2023 1916 by Fransisca Mata RN  Activity (Behavioral Health): up ad donovan  Diversional Activity:   art work   Jack[employer]ing   television   puzzles     Problem: Behavior Regulation Impairment (Excessive Substance Use)  Goal: Improved Behavioral Control (Excessive Substance Use)  Outcome: Ongoing, Progressing     Problem: Decreased Participation and Engagement (Excessive Substance Use)  Goal: Increased Participation and Engagement (Excessive Substance Use)  Outcome: Ongoing, Progressing  Intervention: Facilitate Participation and Engagement  Recent Flowsheet Documentation  Taken 8/10/2023 1916 by Fransisca Mata RN  Diversional Activity:   art work   journaling   television   puzzles     Problem: Physiologic Impairment (Excessive Substance Use)  Goal: Improved Physiologic  Symptoms (Excessive Substance Use)  Outcome: Ongoing, Progressing     Problem: Social, Occupational or Functional Impairment (Excessive Substance Use)  Goal: Enhanced Social, Occupational or Functional Skills (Excessive Substance Use)  Outcome: Ongoing, Progressing  Intervention: Promote Social, Occupational and Functional Ability  Recent Flowsheet Documentation  Taken 8/10/2023 1916 by Fransisca Mata RN  Trust Relationship/Rapport: care explained     Problem: Suicide Risk  Goal: Absence of Self-Harm  Outcome: Ongoing, Progressing  Intervention: Promote Psychosocial Wellbeing  Recent Flowsheet Documentation  Taken 8/10/2023 1916 by Fransisca Mata RN  Sleep/Rest Enhancement:   awakenings minimized   relaxation techniques promoted   regular sleep/rest pattern promoted   Goal Outcome Evaluation:  Plan of Care Reviewed With: patient  Patient Agreement with Plan of Care: agrees        Outcome Evaluation: Pt rated anxiety 0/10, depression 0/10, Denies SI/Hi. Reports AVH, sees shadow people, and hears whispering. Reports good appetite and poor sleep.

## 2023-08-11 NOTE — PLAN OF CARE
Goal Outcome Evaluation:   Consent Given to Review Plan with: Declines  Progress: improving  Outcome Evaluation: Therapist met with patient to review plan of care; patient agreeable.         DATA:      Therapist discussed case with RN and met with patient today to review coping skills, review plan of care, and discuss discharge.       Clinical Maneuvering/Intervention:     Therapist assisted patient in processing above session content; acknowledged and normalized patient's thoughts, feelings, and concerns.  Discussed the therapist/patient relationship and explain the parameters and limitations of relative confidentiality.  Also discussed the importance of active participation, and honesty to the treatment process.  Encouraged the patient to discuss/vent their feelings, frustrations, and fears concerning their ongoing medical issues and validated their feelings.     Allowed patient to freely discuss issues without interruption or judgment. Provided safe, confidential environment to facilitate the development of positive therapeutic relationship and encourage open, honest communication.      Therapist addressed discharge safety planning this date. Assisted patient in identifying risk factors which would indicate the need for higher level of care after discharge;  including thoughts to harm self or others and/or self-harming behavior. Encouraged patient to call 911, or present to the nearest emergency room should any of these events occur. Discussed crisis intervention services and means to access.  Encouraged securing any objects of harm.    Therapist completed integrated summary, treatment plan, and initiated social history this date.  Therapist is strongly encouraging family involvement in treatment.       Encouraged mask wearing, social distancing, and regular hand washing due to COVID19 risk.      ASSESSMENT:      Therapist met 1:1 with patient today. Patient adamantly denies suicidal ideation. Patient denies  homicidal ideation. Patient denies AVH. Patient is calm and cooperative with assessment. He reports improvement in mood and symptoms. Patient is hopeful to do his intake screening with [facility] today. Patient reports interest in another facility if [facility] cannot take him. Patient is future oriented and reports feeling optimistic about his situation.      PLAN:       Patient to remain hospitalized this date.      Treatment team will focus efforts on stabilizing patient's acute symptoms while providing education on healthy coping and crisis management to reduce hospitalizations.   Patient requires daily psychiatrist evaluation and 24/7 nursing supervision to promote patient  safety.     Therapist will offer 1-4 individual sessions, 1 therapy group daily, family education, and appropriate referral.
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
## 2023-08-12 PROCEDURE — 99232 SBSQ HOSP IP/OBS MODERATE 35: CPT | Performed by: PSYCHIATRY & NEUROLOGY

## 2023-08-12 RX ADMIN — QUETIAPINE FUMARATE 200 MG: 100 TABLET ORAL at 21:42

## 2023-08-12 RX ADMIN — PRAZOSIN HYDROCHLORIDE 2 MG: 1 CAPSULE ORAL at 21:42

## 2023-08-12 RX ADMIN — FLUOXETINE HYDROCHLORIDE 20 MG: 20 CAPSULE ORAL at 09:18

## 2023-08-12 NOTE — PLAN OF CARE
Goal Outcome Evaluation:  Plan of Care Reviewed With: patient  Patient Agreement with Plan of Care: agrees     Progress: improving  Outcome Evaluation: pt. sesar slept all night denies depression siddharth anxiety denies s/i deneis h/i he verbalana is craving 6 for m meth he verbalana is going to Rehab when discharged .

## 2023-08-12 NOTE — PROGRESS NOTES
"      Inpatient Psych Progress Note     Clinician: Uday Garcia MD  Admission Date: 8/8/2023  07:33 EDT 08/12/23    Behavioral Health Treatment Plan and Problem List: I have reviewed and approved the Behavioral Health Treatment Plan and Problem list.    Allergies  Allergies   Allergen Reactions    Capsicum Annuum Extract & Derivative (Bell Pepper) [Capsicum] Hives    Fish-Derived Products Anaphylaxis    Onion Anaphylaxis    Peanut-Containing Drug Products Hives    Tomato Itching       Hospital Day: 4 days      Assessment completed within view of staff    History  CC/clinical focus: depression, SI    Interval HPI: Patient seen and evaluated by me.  Chart reviewed. Patient with no significant changes overnight. Reports feeling well today. Tolerating meds well, he feels like they are helping. Denies SI/HI.   Med Compliant.  ROS otherwise as below.      Interval Review of Systems:   General ROS: negative for - fever or malaise  Endocrine ROS: negative for - palpitations  Respiratory ROS: no cough, shortness of breath, or wheezing  Cardiovascular ROS: no chest pain or dyspnea on exertion  Gastrointestinal ROS: no abdominal pain,no black or bloody stools    /65 (BP Location: Right arm, Patient Position: Sitting)   Pulse 103   Temp 97.6 øF (36.4 øC) (Temporal)   Resp 16   Ht 185.4 cm (73\")   Wt 120 kg (264 lb 3.2 oz)   SpO2 99%   BMI 34.86 kg/mý     Mental Status Exam  Mood: depressed  Affect: mood-congruent   Thought Processes: linear, logical, and goal directed  Thought Content: normal  Hallucinations: no  Suicidal Thoughts: denies  Suicidal Plan/Intent: denies  Hopelesness:Moderate  Homicidal Thoughts:  denies      Medical Decision Making:   Labs:     Lab Results (last 24 hours)       ** No results found for the last 24 hours. **              Radiology:     Imaging Results (Last 24 Hours)       ** No results found for the last 24 hours. **              EKG:     ECG/EMG Results (most recent)       " Procedure Component Value Units Date/Time    ECG 12 Lead Other [[id number]] Collected: 08/08/23 0245     Updated: 08/08/23 1827     QT Interval 390 ms      QTC Interval 423 ms     Narrative:      Test Reason : Baseline Cardiac Status~  Blood Pressure :   */*   mmHG  Vent. Rate :  71 BPM     Atrial Rate :  71 BPM     P-R Int : 142 ms          QRS Dur :  86 ms      QT Int : 390 ms       P-R-T Axes :  24  26  29 degrees     QTc Int : 423 ms    Normal sinus rhythm  Normal ECG  No previous ECGs available  Confirmed by Ragini Bush ([id number]) on 8/8/2023 6:27:25 PM    Referred By:            Confirmed By: Ragini Bush             Medications:  FLUoxetine, 20 mg, Oral, Daily  prazosin, 2 mg, Oral, Nightly  QUEtiapine, 200 mg, Oral, Nightly           All medications reviewed.      Assessment and Plan:     Suicidal Ideation  - SI with plan  - Admit for crisis stabilization  - SP3     Unspecified bipolar disorder  - Continue current Rx  - Pt previously stable on lithium XR 450mg BID, today he reports that he feels like lithium didn't really help him in the past, he feels that current medications are helping   - We will consider additional treatment options as indicated  - We will establish outpatient psychiatric care following hospitalization     Post Traumatic Stress Disorder  - Continue prazosin 1mg nightly  - OP care as above     Methamphetamine use disorder, severe, dependence  - Admission UDS positive  - Supportive care  - We will refer for substance abuse treatment following hospitalization         Continue hospitalization for safety and stabilization.  Continue current level of Special Precautions (q15 minute checks).        This note was generated by a scribeFazal. The work documented in this note was completed, reviewed, and approved by the attending psychiatrist as designated Dr. Uday Garcia electronic signature.     I, Uday Garcia MD, personally performed the services described in this documentation  as scribed by the above named individual and is both accurate and complete.

## 2023-08-12 NOTE — PLAN OF CARE
Goal Outcome Evaluation:  Plan of Care Reviewed With: patient  Patient Agreement with Plan of Care: agrees     Progress: no change           Pt was calm and cooperative during assessment. Pt spent most of his evening in the day room watching television. Pt rated anxiety 0/10, and depression 0/10. Pt denied SI, HI, and AVH.

## 2023-08-13 PROCEDURE — 25010000002 ZIPRASIDONE MESYLATE PER 10 MG: Performed by: PSYCHIATRY & NEUROLOGY

## 2023-08-13 PROCEDURE — 99232 SBSQ HOSP IP/OBS MODERATE 35: CPT | Performed by: PSYCHIATRY & NEUROLOGY

## 2023-08-13 RX ADMIN — QUETIAPINE FUMARATE 200 MG: 100 TABLET ORAL at 22:23

## 2023-08-13 RX ADMIN — PRAZOSIN HYDROCHLORIDE 2 MG: 1 CAPSULE ORAL at 22:24

## 2023-08-13 RX ADMIN — ZIPRASIDONE MESYLATE 10 MG: 20 INJECTION, POWDER, LYOPHILIZED, FOR SOLUTION INTRAMUSCULAR at 14:43

## 2023-08-13 RX ADMIN — FLUOXETINE HYDROCHLORIDE 20 MG: 20 CAPSULE ORAL at 11:44
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
## 2023-08-13 NOTE — NURSING NOTE
1440 PATIENT WILLINGLY RECEIVED 10 MG OF GEODON IM. PATIENT IS CURRENTLY, CALMLY  WATCHING TELEVISION IN THE DAYROOM.

## 2023-08-13 NOTE — PLAN OF CARE
Goal Outcome Evaluation:  Plan of Care Reviewed With: patient  Patient Agreement with Plan of Care: agrees     Progress: improving  Outcome Evaluation: Pt reports good appetite and fair sleep. Rates anxiety and depression 0/10. Denies Si/HI and AVH.

## 2023-08-13 NOTE — NURSING NOTE
DR. YANETH GILBERT WAS MADE AWRE OF THE PATIENTS THREATS AND NEW ORDERS WERE ENTERED.  PATIENT TO RECEIVE 10MG GEODON IM.  SECURITY IS ON THE WAY TO THE UNIT TO ASSIST.
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
## 2023-08-13 NOTE — PROGRESS NOTES
"      Inpatient Psych Progress Note     Clinician: Uday Garcia MD  Admission Date: 8/8/2023  07:46 EDT 08/13/23    Behavioral Health Treatment Plan and Problem List: I have reviewed and approved the Behavioral Health Treatment Plan and Problem list.    Allergies  Allergies   Allergen Reactions    Capsicum Annuum Extract & Derivative (Bell Pepper) [Capsicum] Hives    Fish-Derived Products Anaphylaxis    Onion Anaphylaxis    Peanut-Containing Drug Products Hives    Tomato Itching       Hospital Day: 5 days      Assessment completed within view of staff    History  CC/clinical focus: depression, SI    Interval HPI: Patient seen and evaluated by me.  Chart reviewed. Doing \"good\" today. No significant changes overnight, no new concerns from nursing staff. Tolerating medications well, denies any side effects. He reports sleeping well. Denies SI/HI. Denies any AVH.   Med Compliant.  ROS otherwise as below.      Interval Review of Systems:   General ROS: negative for - fever or malaise  Endocrine ROS: negative for - palpitations  Respiratory ROS: no cough, shortness of breath, or wheezing  Cardiovascular ROS: no chest pain or dyspnea on exertion  Gastrointestinal ROS: no abdominal pain,no black or bloody stools    /86 (BP Location: Right arm, Patient Position: Sitting)   Pulse 100   Temp 97.3 øF (36.3 øC) (Temporal)   Resp 18   Ht 185.4 cm (73\")   Wt 120 kg (264 lb 3.2 oz)   SpO2 97%   BMI 34.86 kg/mý     Mental Status Exam  Mood: normal  Affect: mood-congruent   Thought Processes: linear, logical, and goal directed  Thought Content: normal  Hallucinations: no  Suicidal Thoughts: denies  Suicidal Plan/Intent: denies  Hopelesness:Mild  Homicidal Thoughts:  denies      Medical Decision Making:   Labs:     Lab Results (last 24 hours)       ** No results found for the last 24 hours. **              Radiology:     Imaging Results (Last 24 Hours)       ** No results found for the last 24 hours. **         "      EKG:     ECG/EMG Results (most recent)       Procedure Component Value Units Date/Time    ECG 12 Lead Other [[id number]] Collected: 08/08/23 0245     Updated: 08/08/23 1827     QT Interval 390 ms      QTC Interval 423 ms     Narrative:      Test Reason : Baseline Cardiac Status~  Blood Pressure :   */*   mmHG  Vent. Rate :  71 BPM     Atrial Rate :  71 BPM     P-R Int : 142 ms          QRS Dur :  86 ms      QT Int : 390 ms       P-R-T Axes :  24  26  29 degrees     QTc Int : 423 ms    Normal sinus rhythm  Normal ECG  No previous ECGs available  Confirmed by Ragini Bush (2004) on 8/8/2023 6:27:25 PM    Referred By:            Confirmed By: Ragini Bush             Medications:  FLUoxetine, 20 mg, Oral, Daily  prazosin, 2 mg, Oral, Nightly  QUEtiapine, 200 mg, Oral, Nightly           All medications reviewed.      Assessment and Plan:      Suicidal Ideation  - SI with plan  - Admit for crisis stabilization  - SP3     Unspecified bipolar disorder  - Continue current Rx  - Pt previously stable on lithium XR 450mg BID, during this admission he reported that he felt like lithium didn't really help him in the past, he feels that current medications are helping   - Plan discharge tomorrow if patient continues to do well tonight   - We will establish outpatient psychiatric care following hospitalization     Post Traumatic Stress Disorder  - Continue prazosin 1mg nightly  - OP care as above     Methamphetamine use disorder, severe, dependence  - Admission UDS positive  - Supportive care  - We will refer for substance abuse treatment following hospitalization      Continue hospitalization for safety and stabilization.  Continue current level of Special Precautions (q15 minute checks).        This note was generated by a zanaibFazal crane. The work documented in this note was completed, reviewed, and approved by the attending psychiatrist as designated Dr. Uday Garcia electronic signature.     IUday,  MD, personally performed the services described in this documentation as scribed by the above named individual and is both accurate and complete as of 8/13/2023.

## 2023-08-14 PROCEDURE — 99232 SBSQ HOSP IP/OBS MODERATE 35: CPT | Performed by: PSYCHIATRY & NEUROLOGY

## 2023-08-14 RX ADMIN — PRAZOSIN HYDROCHLORIDE 2 MG: 1 CAPSULE ORAL at 21:35

## 2023-08-14 RX ADMIN — QUETIAPINE FUMARATE 200 MG: 100 TABLET ORAL at 21:35

## 2023-08-14 RX ADMIN — FLUOXETINE HYDROCHLORIDE 20 MG: 20 CAPSULE ORAL at 09:56

## 2023-08-14 NOTE — PLAN OF CARE
Goal Outcome Evaluation:   Consent Given to Review Plan with: Declines  Progress: improving  Outcome Evaluation: Therapist met with patient to review plan of care; patient agreeable.       DATA:      Therapist discussed case with RN and met with patient today to review coping skills, review plan of care, and discuss discharge.     Clinical Maneuvering/Intervention:     Therapist assisted patient in processing above session content; acknowledged and normalized patient's thoughts, feelings, and concerns.  Discussed the therapist/patient relationship and explain the parameters and limitations of relative confidentiality.  Also discussed the importance of active participation, and honesty to the treatment process.  Encouraged the patient to discuss/vent their feelings, frustrations, and fears concerning their ongoing medical issues and validated their feelings.     Allowed patient to freely discuss issues without interruption or judgment. Provided safe, confidential environment to facilitate the development of positive therapeutic relationship and encourage open, honest communication.      Therapist addressed discharge safety planning this date. Assisted patient in identifying risk factors which would indicate the need for higher level of care after discharge;  including thoughts to harm self or others and/or self-harming behavior. Encouraged patient to call 911, or present to the nearest emergency room should any of these events occur. Discussed crisis intervention services and means to access.  Encouraged securing any objects of harm.    Therapist completed integrated summary, treatment plan, and initiated social history this date.  Therapist is strongly encouraging family involvement in treatment.       Encouraged mask wearing, social distancing, and regular hand washing due to COVID19 risk.      ASSESSMENT:      Therapist met 1:1 with patient today. Patient denies suicidal ideation. Patient denies homicidal  ideation. Patient denies AVH. Patient is calm and cooperative with session. He reports looking forward to going to rehab. He denies any needs or concerns.      PLAN:       Patient to remain hospitalized this date.      Treatment team will focus efforts on stabilizing patient's acute symptoms while providing education on healthy coping and crisis management to reduce hospitalizations.   Patient requires daily psychiatrist evaluation and 24/7 nursing supervision to promote patient  safety.     Therapist will offer 1-4 individual sessions, 1 therapy group daily, family education, and appropriate referral.

## 2023-08-14 NOTE — PLAN OF CARE
Goal Outcome Evaluation:  Plan of Care Reviewed With: patient  Patient Agreement with Plan of Care: agrees     Progress: no change  Outcome Evaluation: Pt reports good appetite and good sleep. Pt denies anxiety and depression. Denies SI/HI and AVH.

## 2023-08-14 NOTE — DISCHARGE INSTR - APPOINTMENTS
[facility], [place] [address] [address], [place] [phone]    8/15/2023 at 3:00pm.

## 2023-08-14 NOTE — PLAN OF CARE
Problem: Adult Behavioral Health Plan of Care  Goal: Plan of Care Review  Outcome: Ongoing, Progressing  Flowsheets  Taken 8/14/2023 1437  Progress: improving  Taken 8/14/2023 0802  Plan of Care Reviewed With: patient  Patient Agreement with Plan of Care: agrees   Goal Outcome Evaluation:  Plan of Care Reviewed With: patient  Patient Agreement with Plan of Care: agrees     Progress: improving

## 2023-08-14 NOTE — PROGRESS NOTES
Navigator is helping with the following referral:    Maria D  [phone]  -Therapist faxed updated MD notes. 8/14  -Spoke with Jamila. Transferred call so patient could complete phone screening. Bed held for [facility] tomorrow pending acceptance.  8/14  -Patient approved.  Bed available tomorrow at [facility]. Patient to arrive by 3pm.  8/14

## 2023-08-14 NOTE — DISCHARGE PLACEMENT REQUEST
"Linwood Patel (23 y.o. Male)       Date of Birth   2000    Social Security Number       Address   [address] [place] [address]    Home Phone   [phone]    MRN   [id number]       Latter-day   None    Marital Status   Single                            Admission Date   23    Admission Type   Emergency    Admitting Provider   Uday Garcia MD    Attending Provider   Uday Garcia MD    Department, Room/Bed   [facility] ADULT PSYCHIATRIC, 1016/2S       Discharge Date       Discharge Disposition       Discharge Destination                                 Attending Provider: Uday Garcia MD    Allergies: Capsicum Annuum Extract & Derivative (Bell Pepper) [Capsicum], Fish-derived Products, Onion, Peanut-containing Drug Products, Tomato    Isolation: None   Infection: None   Code Status: CPR    Ht: 185.4 cm (73\")   Wt: 120 kg (264 lb 3.2 oz)    Admission Cmt: None   Principal Problem: Suicidal ideation [R45.851]                   Active Insurance as of 2023       Primary Coverage       Payor Plan Insurance Group Employer/Plan Group    [facility] COMMUNITY PLAN [facility] COMMUNITY PLAN [facility]       Payor Plan Address Payor Plan Phone Number Payor Plan Fax Number Effective Dates    [address]   2022 - None Entered    [facility] [address]         Subscriber Name Subscriber Birth Date Member ID       LINWOOD PATEL 2000 [id number]                     Emergency Contacts        (Rel.) Home Phone Work Phone Mobile Phone    (GODMOTHER),GILBERTO (Friend) [phone] -- [phone]    MEGAN VILLASENOR (Friend) [phone] -- [phone]                 History & Physical        Jose Bradley MD at 23 1001                INITIAL PSYCHIATRIC HISTORY & PHYSICAL    Patient Identification:  Name:  Linwood Patel  Age:  23 y.o.  Sex:  male  :  2000  MRN:  [id number]   Visit Number:  [id number]  Primary Care Physician:  Provider, No " Known    SUBJECTIVE    CC/Focus of Exam: SI with plan    HPI: Linwood Patel is a 23 y.o. male who was admitted on 2023 with complaints of depression, SI with a plan. Pt in town for step-mother's .    Patient reports worsening depression, with symptoms of low mood, low energy, low motivation, poor concentration, high anxiety, anhedonia, hopelessness, worthlessness, insomnia, and SI.  Symptoms are severe, persistent, present in multiple settings, worse in the last month, worse by interpersonal stressors, improved by nothing.    Pt reports long history of auditory hallucinations, usually negative & nihilistic. Pt reports girlfriend drowned 8 months ago. Step-mother passed away 4 days ago.     PAST PSYCHIATRIC HX:  Dx: depression, PTSD  IP: two previous admissions to [facility]  OP: none currently  Current meds: denied  Previous meds: lithium, prazosin  SH/SI/SA: denied/intermittent/endorsed  Trauma: endorsed    SUBSTANCE USE HX:  Recent abuse of methamphetamine and cocaine.   Admission UDS +meth    SOCIAL HX:  Currently homeless.   Initially from [place], in [place] after attending rehab. Has children in [place].  Unemployed    FAMILY HX:    History reviewed. No pertinent family history.    Past Medical History:   Diagnosis Date    Anxiety     Bipolar 1 disorder     Depression     PTSD (post-traumatic stress disorder)     Substance abuse        Past Surgical History:   Procedure Laterality Date    KNEE ACL RECONSTRUCTION Right     KNEE ACL RECONSTRUCTION Right 2016       Medications Prior to Admission   Medication Sig Dispense Refill Last Dose    acetaminophen (TYLENOL) 325 MG tablet Take 2 tablets by mouth Every 6 (Six) Hours As Needed for Mild Pain.       albuterol sulfate  (90 Base) MCG/ACT inhaler Inhale 2 puffs Every 4 (Four) Hours As Needed for Wheezing.       melatonin 5 MG tablet tablet Take 1 tablet by mouth At Night As Needed.            ALLERGIES:  Capsicum annuum extract &  derivative (bell pepper) [capsicum], Fish-derived products, Onion, Peanut-containing drug products, and Tomato    Temp:  [96.2 øF (35.7 øC)-98.3 øF (36.8 øC)] 96.2 øF (35.7 øC)  Heart Rate:  [71-94] 71  Resp:  [18-19] 19  BP: (110-145)/(66-87) 119/75    REVIEW OF SYSTEMS:  Review of Systems   Psychiatric/Behavioral:  Positive for dysphoric mood, hallucinations, sleep disturbance and suicidal ideas. The patient is nervous/anxious.       OBJECTIVE    PHYSICAL EXAM:  Physical Exam  Vitals and nursing note reviewed.   Constitutional:       Appearance: He is well-developed.   HENT:      Head: Normocephalic and atraumatic.      Right Ear: External ear normal.      Left Ear: External ear normal.      Nose: Nose normal.   Eyes:      Pupils: Pupils are equal, round, and reactive to light.   Pulmonary:      Effort: Pulmonary effort is normal. No respiratory distress.      Breath sounds: Normal breath sounds.   Abdominal:      General: There is no distension.      Palpations: Abdomen is soft.   Musculoskeletal:         General: No deformity. Normal range of motion.      Cervical back: Normal range of motion and neck supple.   Skin:     General: Skin is warm.      Findings: No rash.   Neurological:      Mental Status: He is alert and oriented to person, place, and time.      Coordination: Coordination normal.       MENTAL STATUS EXAM:   Hygiene:   fair  Cooperation:  Guarded  Eye Contact:  Poor  Psychomotor Behavior:  Slow  Affect:  Restricted  Hopelessness: 9  Speech:  Minimal  Thought Process: Linear  Thought Content:  Normal  Suicidal:  Suicidal Ideation and Suicidal plan  Homicidal:  None  Hallucinations:  Auditory  Delusion:  None  Memory:  Intact  Orientation:  Person, Place, Time, and Situation  Reliability:  poor  Insight:  Poor  Judgment:  Poor  Impulse Control:  Poor      Imaging Results (Last 24 Hours)       ** No results found for the last 24 hours. **             Lab Results   Component Value Date    GLUCOSE 100  (H) 08/08/2023    BUN 11 08/08/2023    CREATININE 0.95 08/08/2023    BCR 11.6 08/08/2023    CO2 23.1 08/08/2023    CALCIUM 9.2 08/08/2023    ALBUMIN 4.2 08/08/2023    AST 37 08/08/2023    ALT 85 (H) 08/08/2023       Lab Results   Component Value Date    WBC 8.62 08/08/2023    HGB 14.5 08/08/2023    HCT 43.6 08/08/2023    MCV 82.4 08/08/2023     08/08/2023       ECG/EMG Results (most recent)       Procedure Component Value Units Date/Time    ECG 12 Lead Other [[id number]] Collected: 08/08/23 0245     Updated: 08/08/23 0258     QT Interval 390 ms      QTC Interval 423 ms     Narrative:      Test Reason : Baseline Cardiac Status~  Blood Pressure :   */*   mmHG  Vent. Rate :  71 BPM     Atrial Rate :  71 BPM     P-R Int : 142 ms          QRS Dur :  86 ms      QT Int : 390 ms       P-R-T Axes :  24  26  29 degrees     QTc Int : 423 ms    Normal sinus rhythm  Normal ECG  No previous ECGs available    Referred By:            Confirmed By:              Brief Urine Lab Results  (Last result in the past 365 days)        Color   Clarity   Blood   Leuk Est   Nitrite   Protein   CREAT   Urine HCG        08/08/23 0027 Dark Yellow   Clear   Negative   Negative   Negative   Negative                   Last Urine Toxicity  More data exists         Latest Ref Rng & Units 8/8/2023 7/13/2023   LAST URINE TOXICITY RESULTS   Amphetamine, Urine Qual Negative Negative  -   Barbiturates Screen, Urine Negative Negative  Absent       Benzodiazepine Screen, Urine Negative Negative  Absent       Buprenorphine, Screen, Urine Negative Negative  -   Cocaine Screen, Urine Negative Negative  -   Fentanyl, Urine Negative Negative  -   Methadone Screen , Urine Negative Negative  -   Methamphetamine, Ur Negative Positive  -      Details          This result is from an external source.               Chart, notes, vitals, labs personally reviewed.  ALT 85  [facility] report requested, reviewed  UDS results: +meth  EKG tracing personally  reviewed, interpreted as normal sinus rhythm, Qtc interval 423  Consulted with patient's therapist regarding clinical history and treatment plan    ASSESSMENT & PLAN:    Suicidal Ideation  -SI with plan  -Admit for crisis stabilization  -SP3    Unspecified bipolar disorder  -Pt previously stable on lithium XR 450mg BID  -We will consider treatment options as indicated  -We will establish outpatient psychiatric care following hospitalization    Post Traumatic Stress Disorder  -Reinstate prazosin 1mg nightly  -OP care as above    Methamphetamine use disorder, severe, dependence  -Admission UDS positive  -Supportive care  -We will refer for substance abuse treatment following hospitalization    The patient has been admitted for safety and stabilization.  Patient will be monitored for suicidality daily and maintained on Special Precautions Level 3 (q15 min checks) .  The patient will have individual and group therapy with a master's level therapist. A master treatment plan will be developed and agreed upon by the patient and his/her treatment team.  The patient's estimated length of stay in the hospital is 5-7 days.       Electronically signed by Jose Bradley MD at 08/08/23 1242       Current Facility-Administered Medications   Medication Dose Route Frequency Provider Last Rate Last Admin    aluminum-magnesium hydroxide-simethicone (MAALOX MAX) 400-400-40 MG/5ML suspension 15 mL  15 mL Oral Q6H PRN Uday Garcia MD   15 mL at 08/11/23 0819    benzonatate (TESSALON) capsule 100 mg  100 mg Oral TID PRN Uday Garcia MD        benztropine (COGENTIN) tablet 2 mg  2 mg Oral Once PRN Uday Garcia MD        Or    benztropine (COGENTIN) injection 1 mg  1 mg Intramuscular Once PRN Uday Garcia MD        famotidine (PEPCID) tablet 20 mg  20 mg Oral BID PRN Uday Garcia MD        FLUoxetine (PROzac) capsule 20 mg  20 mg Oral Daily Jose Bradley MD   20 mg at 08/13/23 4184    hydrOXYzine (ATARAX)  "tablet 50 mg  50 mg Oral Q6H PRN Uday Garcia MD        ibuprofen (ADVIL,MOTRIN) tablet 400 mg  400 mg Oral Q6H PRN Uday Garcia MD        loperamide (IMODIUM) capsule 2 mg  2 mg Oral Q2H PRN Uday Garcia MD        magnesium hydroxide (MILK OF MAGNESIA) suspension 10 mL  10 mL Oral Daily PRN Uday Garcia MD        ondansetron (ZOFRAN) tablet 4 mg  4 mg Oral Q6H PRN Uday Garcia MD        prazosin (MINIPRESS) capsule 2 mg  2 mg Oral Nightly Jose Bradley MD   2 mg at 08/13/23 2224    QUEtiapine (SEROquel) tablet 200 mg  200 mg Oral Nightly Jose Bradley MD   200 mg at 08/13/23 2223    sodium chloride nasal spray 2 spray  2 spray Each Nare PRN Uday Garcia MD        traZODone (DESYREL) tablet 50 mg  50 mg Oral Nightly PRN Uday Garcia MD            Physician Progress Notes (last 48 hours)        Uday Garcia MD at 08/13/23 0746                Inpatient Psych Progress Note     Clinician: Uday Garcia MD  Admission Date: 8/8/2023  07:46 EDT 08/13/23    Behavioral Health Treatment Plan and Problem List: I have reviewed and approved the Behavioral Health Treatment Plan and Problem list.    Allergies  Allergies   Allergen Reactions    Capsicum Annuum Extract & Derivative (Bell Pepper) [Capsicum] Hives    Fish-Derived Products Anaphylaxis    Onion Anaphylaxis    Peanut-Containing Drug Products Hives    Tomato Itching       Hospital Day: 5 days      Assessment completed within view of staff    History  CC/clinical focus: depression, SI    Interval HPI: Patient seen and evaluated by me.  Chart reviewed. Doing \"good\" today. No significant changes overnight, no new concerns from nursing staff. Tolerating medications well, denies any side effects. He reports sleeping well. Denies SI/HI. Denies any AVH.   Med Compliant.  ROS otherwise as below.      Interval Review of Systems:   General ROS: negative for - fever or malaise  Endocrine ROS: negative for - palpitations  Respiratory " "ROS: no cough, shortness of breath, or wheezing  Cardiovascular ROS: no chest pain or dyspnea on exertion  Gastrointestinal ROS: no abdominal pain,no black or bloody stools    /86 (BP Location: Right arm, Patient Position: Sitting)   Pulse 100   Temp 97.3 øF (36.3 øC) (Temporal)   Resp 18   Ht 185.4 cm (73\")   Wt 120 kg (264 lb 3.2 oz)   SpO2 97%   BMI 34.86 kg/mý     Mental Status Exam  Mood: normal  Affect: mood-congruent   Thought Processes: linear, logical, and goal directed  Thought Content: normal  Hallucinations: no  Suicidal Thoughts: denies  Suicidal Plan/Intent: denies  Hopelesness:Mild  Homicidal Thoughts:  denies      Medical Decision Making:   Labs:     Lab Results (last 24 hours)       ** No results found for the last 24 hours. **              Radiology:     Imaging Results (Last 24 Hours)       ** No results found for the last 24 hours. **              EKG:     ECG/EMG Results (most recent)       Procedure Component Value Units Date/Time    ECG 12 Lead Other [[id number]] Collected: 08/08/23 0245     Updated: 08/08/23 1827     QT Interval 390 ms      QTC Interval 423 ms     Narrative:      Test Reason : Baseline Cardiac Status~  Blood Pressure :   */*   mmHG  Vent. Rate :  71 BPM     Atrial Rate :  71 BPM     P-R Int : 142 ms          QRS Dur :  86 ms      QT Int : 390 ms       P-R-T Axes :  24  26  29 degrees     QTc Int : 423 ms    Normal sinus rhythm  Normal ECG  No previous ECGs available  Confirmed by Ragini Bush (2004) on 8/8/2023 6:27:25 PM    Referred By:            Confirmed By: Ragini Bush             Medications:  FLUoxetine, 20 mg, Oral, Daily  prazosin, 2 mg, Oral, Nightly  QUEtiapine, 200 mg, Oral, Nightly           All medications reviewed.      Assessment and Plan:      Suicidal Ideation  - SI with plan  - Admit for crisis stabilization  - SP3     Unspecified bipolar disorder  - Continue current Rx  - Pt previously stable on lithium XR 450mg BID, during this " admission he reported that he felt like lithium didn't really help him in the past, he feels that current medications are helping   - Plan discharge tomorrow if patient continues to do well tonight   - We will establish outpatient psychiatric care following hospitalization     Post Traumatic Stress Disorder  - Continue prazosin 1mg nightly  - OP care as above     Methamphetamine use disorder, severe, dependence  - Admission UDS positive  - Supportive care  - We will refer for substance abuse treatment following hospitalization      Continue hospitalization for safety and stabilization.  Continue current level of Special Precautions (q15 minute checks).        This note was generated by a scribFazal crane. The work documented in this note was completed, reviewed, and approved by the attending psychiatrist as designated Dr. Uday Garcia electronic signature.     I, Uday Garcia MD, personally performed the services described in this documentation as scribed by the above named individual and is both accurate and complete as of 8/13/2023.      Electronically signed by Uday Garcia MD at 08/13/23 2033

## 2023-08-14 NOTE — PROGRESS NOTES
" Inpatient Psych Progress Note     Clinician: Uday Garcia MD  Admission Date: 8/8/2023  10:49 EDT 08/14/23    Behavioral Health Treatment Plan and Problem List: I have reviewed and approved the Behavioral Health Treatment Plan and Problem list.    Allergies  Allergies   Allergen Reactions    Capsicum Annuum Extract & Derivative (Bell Pepper) [Capsicum] Hives    Fish-Derived Products Anaphylaxis    Onion Anaphylaxis    Peanut-Containing Drug Products Hives    Tomato Itching       Hospital Day: 6 days      Assessment completed within view of staff    History  CC/clinical focus: depression, SI    Interval HPI: Patient seen and evaluated by me.  Chart reviewed. Patient had an episode of agitation yesterday afternoon, threatened to hurt a peer on the unit.  Received Geodon IM.  He denies thoughts thoughts of wanting to hurt peer(s) today.  Denies SI.  Denies AVH.   Depression improving.     Med Compliant.    Review of Systems   Constitutional: Negative.    HENT: Negative.     Eyes: Negative.    Respiratory: Negative.     Cardiovascular: Negative.    Gastrointestinal: Negative.    Endocrine: Negative.    Genitourinary: Negative.    Musculoskeletal: Negative.    Skin: Negative.    Allergic/Immunologic: Negative.    Neurological: Negative.    Hematological: Negative.    All other systems reviewed and are negative.     /92 (BP Location: Right arm, Patient Position: Sitting)   Pulse 104   Temp 97 øF (36.1 øC) (Temporal)   Resp 18   Ht 185.4 cm (73\")   Wt 120 kg (264 lb 3.2 oz)   SpO2 98%   BMI 34.86 kg/mý     Mental Status Exam  Mood: normal  Affect: normal   Thought Processes: linear  Thought Content: normal  Hallucinations: no  Suicidal Thoughts: denies  Suicidal Plan/Intent: denies  Hopelesness:no  Homicidal Thoughts:  denies      Medical Decision Making:   Labs:     Lab Results (last 24 hours)       ** No results found for the last 24 hours. **              Radiology:     Imaging Results (Last 24 Hours)  "      ** No results found for the last 24 hours. **              EKG:     ECG/EMG Results (most recent)       Procedure Component Value Units Date/Time    ECG 12 Lead Other [[id number]] Collected: 08/08/23 0245     Updated: 08/08/23 1827     QT Interval 390 ms      QTC Interval 423 ms     Narrative:      Test Reason : Baseline Cardiac Status~  Blood Pressure :   */*   mmHG  Vent. Rate :  71 BPM     Atrial Rate :  71 BPM     P-R Int : 142 ms          QRS Dur :  86 ms      QT Int : 390 ms       P-R-T Axes :  24  26  29 degrees     QTc Int : 423 ms    Normal sinus rhythm  Normal ECG  No previous ECGs available  Confirmed by Ragini Bush ([id number]) on 8/8/2023 6:27:25 PM    Referred By:            Confirmed By: Ragini Bush             Medications:  FLUoxetine, 20 mg, Oral, Daily  prazosin, 2 mg, Oral, Nightly  QUEtiapine, 200 mg, Oral, Nightly           All medications reviewed.      Assessment and Plan:    Suicidal Ideation  - SI with plan  - Admitted for crisis stabilization  - SP3     Unspecified bipolar disorder  - Continue current Rx  - Monitor for stability overnight to ensure no additional episodes of agitation, consider discharge tomorrow.  Likely to [facility].     Post Traumatic Stress Disorder  - Continue prazosin 1mg nightly     Methamphetamine use disorder, severe, dependence  - Admission UDS positive  - Supportive care         Continue hospitalization for safety and stabilization.  Continue current level of Special Precautions (q15 minute checks).    I, Uday Garcia MD, I have completed an encounter with the patient today, provided ongoing monitoring and/or adjustments to the assessment and plan described and documented above, and believe this information to be both accurate and complete as of 8/14/2023

## 2023-08-15 VITALS
HEIGHT: 73 IN | HEART RATE: 89 BPM | TEMPERATURE: 98.2 F | BODY MASS INDEX: 35.02 KG/M2 | SYSTOLIC BLOOD PRESSURE: 116 MMHG | WEIGHT: 264.2 LBS | DIASTOLIC BLOOD PRESSURE: 77 MMHG | OXYGEN SATURATION: 97 % | RESPIRATION RATE: 18 BRPM

## 2023-08-15 PROBLEM — F31.9 BIPOLAR AFFECTIVE DISORDER: Status: ACTIVE | Noted: 2023-08-15

## 2023-08-15 PROBLEM — F15.20 METHAMPHETAMINE USE DISORDER, SEVERE, DEPENDENCE: Status: ACTIVE | Noted: 2023-08-15

## 2023-08-15 PROCEDURE — 99238 HOSP IP/OBS DSCHRG MGMT 30/<: CPT | Performed by: PSYCHIATRY & NEUROLOGY

## 2023-08-15 RX ORDER — FLUOXETINE HYDROCHLORIDE 20 MG/1
20 CAPSULE ORAL DAILY
Qty: 30 CAPSULE | Refills: 0 | Status: SHIPPED | OUTPATIENT
Start: 2023-08-16

## 2023-08-15 RX ORDER — QUETIAPINE FUMARATE 200 MG/1
200 TABLET, FILM COATED ORAL NIGHTLY
Qty: 30 TABLET | Refills: 0 | Status: SHIPPED | OUTPATIENT
Start: 2023-08-15

## 2023-08-15 RX ORDER — PRAZOSIN HYDROCHLORIDE 2 MG/1
2 CAPSULE ORAL NIGHTLY
Qty: 30 CAPSULE | Refills: 0 | Status: SHIPPED | OUTPATIENT
Start: 2023-08-15

## 2023-08-15 RX ADMIN — FLUOXETINE HYDROCHLORIDE 20 MG: 20 CAPSULE ORAL at 10:40

## 2023-08-15 RX ADMIN — HYDROXYZINE HYDROCHLORIDE 50 MG: 50 TABLET ORAL at 12:45

## 2023-08-15 RX ADMIN — IBUPROFEN 400 MG: 400 TABLET, FILM COATED ORAL at 10:40

## 2023-08-15 NOTE — DISCHARGE SUMMARY
":  2000  MRN:  [id number]  Visit Number:  [id number]      Date of Admission:2023   Date of Discharge:  8/15/2023    Discharge Diagnosis:  Principal Problem:    Suicidal ideation  Active Problems:    Post traumatic stress disorder (PTSD)    Bipolar affective disorder    Methamphetamine use disorder, severe, dependence        Admission Diagnosis:  Suicidal ideation [R45.851]     LUKAS Patel is a 23 y.o. male who was admitted on 2023 with complaints of depression, SI with a plan.   For details please see H&P dated 23.    Hospital Course  Patient is a 23 y.o. male presented with depression and suicidal ideations.  40 multiple stressors in the recent past.  He also reported history of bipolar disorder and PTSD.  Was admitted to adult psych unit.  For his bipolar disorder he was started on quetiapine and fluoxetine. For his nightmares associated with PTSD he was started on prazosin.The patient was also able to take part in individual and group counseling sessions and work on appropriate coping skills.  The patient made steady improvement in his mood and expressed feeling more positive and hopeful about future. Sleep and appetite were improved.  The day of discharge the patient was calm, cooperative and pleasant. Mood was reported to be good, and denied SI/HI/AVH. Also reported no medication side effects.      Mental Status Exam upon discharge:   Mood \"good\"   Affect-congruent, appropriate, stable  Thought Content-goal directed, no delusional material present  Thought process-linear, organized.  Suicidality: No SI  Homicidality: No HI  Perception: No /    Procedures Performed         Consults:   Consults       No orders found from 7/10/2023 to 2023.            Pertinent Test Results:   Admission on 2023   Component Date Value Ref Range Status    QT Interval 2023 390  ms Final    QTC Interval 2023 423  ms Final   Admission on 2023, Discharged on 2023   Component " Date Value Ref Range Status    Glucose 08/08/2023 100 (H)  65 - 99 mg/dL Final    BUN 08/08/2023 11  6 - 20 mg/dL Final    Creatinine 08/08/2023 0.95  0.76 - 1.27 mg/dL Final    Sodium 08/08/2023 142  136 - 145 mmol/L Final    Potassium 08/08/2023 4.2  3.5 - 5.2 mmol/L Final    Chloride 08/08/2023 108 (H)  98 - 107 mmol/L Final    CO2 08/08/2023 23.1  22.0 - 29.0 mmol/L Final    Calcium 08/08/2023 9.2  8.6 - 10.5 mg/dL Final    Total Protein 08/08/2023 6.5  6.0 - 8.5 g/dL Final    Albumin 08/08/2023 4.2  3.5 - 5.2 g/dL Final    ALT (SGPT) 08/08/2023 85 (H)  1 - 41 U/L Final    AST (SGOT) 08/08/2023 37  1 - 40 U/L Final    Alkaline Phosphatase 08/08/2023 80  39 - 117 U/L Final    Total Bilirubin 08/08/2023 0.3  0.0 - 1.2 mg/dL Final    Globulin 08/08/2023 2.3  gm/dL Final    A/G Ratio 08/08/2023 1.8  g/dL Final    BUN/Creatinine Ratio 08/08/2023 11.6  7.0 - 25.0 Final    Anion Gap 08/08/2023 10.9  5.0 - 15.0 mmol/L Final    eGFR 08/08/2023 115.3  >60.0 mL/min/1.73 Final    Color, UA 08/08/2023 Dark Yellow (A)  Yellow, Straw Final    Appearance, UA 08/08/2023 Clear  Clear Final    pH, UA 08/08/2023 5.5  5.0 - 8.0 Final    Specific Gravity, UA 08/08/2023 1.026  1.005 - 1.030 Final    Glucose, UA 08/08/2023 Negative  Negative Final    Ketones, UA 08/08/2023 Negative  Negative Final    Bilirubin, UA 08/08/2023 Negative  Negative Final    Blood, UA 08/08/2023 Negative  Negative Final    Protein, UA 08/08/2023 Negative  Negative Final    Leuk Esterase, UA 08/08/2023 Negative  Negative Final    Nitrite, UA 08/08/2023 Negative  Negative Final    Urobilinogen, UA 08/08/2023 1.0 E.U./dL  0.2 - 1.0 E.U./dL Final    THC, Screen, Urine 08/08/2023 Negative  Negative Final    Phencyclidine (PCP), Urine 08/08/2023 Negative  Negative Final    Cocaine Screen, Urine 08/08/2023 Negative  Negative Final    Methamphetamine, Ur 08/08/2023 Positive (A)  Negative Final    Opiate Screen 08/08/2023 Negative  Negative Final    Amphetamine  Screen, Urine 08/08/2023 Negative  Negative Final    Benzodiazepine Screen, Urine 08/08/2023 Negative  Negative Final    Tricyclic Antidepressants Screen 08/08/2023 Negative  Negative Final    Methadone Screen, Urine 08/08/2023 Negative  Negative Final    Barbiturates Screen, Urine 08/08/2023 Negative  Negative Final    Oxycodone Screen, Urine 08/08/2023 Negative  Negative Final    Propoxyphene Screen 08/08/2023 Negative  Negative Final    Buprenorphine, Screen, Urine 08/08/2023 Negative  Negative Final    Magnesium 08/08/2023 2.0  1.6 - 2.6 mg/dL Final    Ethanol 08/08/2023 <10  0 - 10 mg/dL Final    Ethanol % 08/08/2023 <0.010  % Final    WBC 08/08/2023 8.62  3.40 - 10.80 10*3/mm3 Final    RBC 08/08/2023 5.29  4.14 - 5.80 10*6/mm3 Final    Hemoglobin 08/08/2023 14.5  13.0 - 17.7 g/dL Final    Hematocrit 08/08/2023 43.6  37.5 - 51.0 % Final    MCV 08/08/2023 82.4  79.0 - 97.0 fL Final    MCH 08/08/2023 27.4  26.6 - 33.0 pg Final    MCHC 08/08/2023 33.3  31.5 - 35.7 g/dL Final    RDW 08/08/2023 12.5  12.3 - 15.4 % Final    RDW-SD 08/08/2023 37.2  37.0 - 54.0 fl Final    MPV 08/08/2023 9.3  6.0 - 12.0 fL Final    Platelets 08/08/2023 269  140 - 450 10*3/mm3 Final    Neutrophil % 08/08/2023 53.1  42.7 - 76.0 % Final    Lymphocyte % 08/08/2023 37.0  19.6 - 45.3 % Final    Monocyte % 08/08/2023 7.2  5.0 - 12.0 % Final    Eosinophil % 08/08/2023 2.3  0.3 - 6.2 % Final    Basophil % 08/08/2023 0.2  0.0 - 1.5 % Final    Immature Grans % 08/08/2023 0.2  0.0 - 0.5 % Final    Neutrophils, Absolute 08/08/2023 4.57  1.70 - 7.00 10*3/mm3 Final    Lymphocytes, Absolute 08/08/2023 3.19 (H)  0.70 - 3.10 10*3/mm3 Final    Monocytes, Absolute 08/08/2023 0.62  0.10 - 0.90 10*3/mm3 Final    Eosinophils, Absolute 08/08/2023 0.20  0.00 - 0.40 10*3/mm3 Final    Basophils, Absolute 08/08/2023 0.02  0.00 - 0.20 10*3/mm3 Final    Immature Grans, Absolute 08/08/2023 0.02  0.00 - 0.05 10*3/mm3 Final    nRBC 08/08/2023 0.0  0.0 - 0.2 /100 WBC  Final    COVID19 08/08/2023 Not Detected  Not Detected - Ref. Range Final    Influenza A PCR 08/08/2023 Not Detected  Not Detected Final    Influenza B PCR 08/08/2023 Not Detected  Not Detected Final    Fentanyl, Urine 08/08/2023 Negative  Negative Final        Condition on Discharge:  improved    Vital Signs  Temp:  [97.3 øF (36.3 øC)-98.2 øF (36.8 øC)] 98.2 øF (36.8 øC)  Heart Rate:  [] 89  Resp:  [18] 18  BP: (116-160)/(77-91) 116/77      Discharge Disposition:  Home or Self Care    Discharge Medications:     Discharge Medications        New Medications        Instructions Start Date   FLUoxetine 20 MG capsule  Commonly known as: PROzac   20 mg, Oral, Daily   Start Date: August 16, 2023     prazosin 2 MG capsule  Commonly known as: MINIPRESS   2 mg, Oral, Nightly             Changes to Medications        Instructions Start Date   QUEtiapine 200 MG tablet  Commonly known as: SEROquel  What changed: when to take this   200 mg, Oral, Nightly               Discharge Diet:    Diet Instructions    REGULAR AS TOLERATED           Activity at Discharge:    Activity Instructions    AS TOLERATED           Follow-up Appointments  [place], [place]  [address] [facility], [place] [address] [phone]     8/15/2023 at 3:00pm.     Time spent in discharge: < 30 min    Clinician:   Carl Winston MD  08/15/23  12:45 EDT

## 2023-08-15 NOTE — PROGRESS NOTES
Discharge Note:     On date of discharge, patient is calm and cooperative. Patient denies SI/HI/AVH. Patient reports improvement in mood and symptoms during hospitalization. Patient is future oriented, looking forward to going to [facility] for treatment. Patient is able to identify protective factors and healthy coping skills. Patient is agreeable to return to the nearest ED/contact 911 if they experience SI/HI/AVH. Patient denies needs or concerns prior to discharge today.

## 2023-08-15 NOTE — PROGRESS NOTES
RTEC: Patient is being discharged on this day.     RTEC scheduled for 1:00pm. Going to [facility].

## 2023-08-22 ENCOUNTER — HOSPITAL ENCOUNTER (EMERGENCY)
Facility: HOSPITAL | Age: 23
Discharge: HOME | End: 2023-08-22
Attending: STUDENT IN AN ORGANIZED HEALTH CARE EDUCATION/TRAINING PROGRAM
Payer: MEDICAID

## 2023-08-22 VITALS
RESPIRATION RATE: 16 BRPM | TEMPERATURE: 97.5 F | BODY MASS INDEX: 36.45 KG/M2 | HEART RATE: 99 BPM | DIASTOLIC BLOOD PRESSURE: 96 MMHG | OXYGEN SATURATION: 100 % | WEIGHT: 275 LBS | HEIGHT: 73 IN | SYSTOLIC BLOOD PRESSURE: 136 MMHG

## 2023-08-22 DIAGNOSIS — F31.62 BIPOLAR DISORDER, CURRENT EPISODE MIXED, MODERATE: Primary | ICD-10-CM

## 2023-08-22 LAB
ALBUMIN SERPL-MCNC: 4.5 G/DL (ref 3.5–5.2)
ALBUMIN/GLOB SERPL: 1.6 G/DL
ALP SERPL-CCNC: 95 U/L (ref 39–117)
ALT SERPL W P-5'-P-CCNC: 219 U/L (ref 1–41)
AMPHET+METHAMPHET UR QL: NEGATIVE
AMPHETAMINES UR QL: NEGATIVE
ANION GAP SERPL CALCULATED.3IONS-SCNC: 9.3 MMOL/L (ref 5–15)
AST SERPL-CCNC: 79 U/L (ref 1–40)
BARBITURATES UR QL SCN: NEGATIVE
BASOPHILS # BLD AUTO: 0.03 10*3/MM3 (ref 0–0.2)
BASOPHILS NFR BLD AUTO: 0.4 % (ref 0–1.5)
BENZODIAZ UR QL SCN: NEGATIVE
BILIRUB SERPL-MCNC: 0.4 MG/DL (ref 0–1.2)
BILIRUB UR QL STRIP: NEGATIVE
BUN SERPL-MCNC: 17 MG/DL (ref 6–20)
BUN/CREAT SERPL: 17.3 (ref 7–25)
BUPRENORPHINE SERPL-MCNC: NEGATIVE NG/ML
CALCIUM SPEC-SCNC: 9.9 MG/DL (ref 8.6–10.5)
CANNABINOIDS SERPL QL: NEGATIVE
CHLORIDE SERPL-SCNC: 105 MMOL/L (ref 98–107)
CLARITY UR: CLEAR
CO2 SERPL-SCNC: 24.7 MMOL/L (ref 22–29)
COCAINE UR QL: NEGATIVE
COLOR UR: YELLOW
CREAT SERPL-MCNC: 0.98 MG/DL (ref 0.76–1.27)
DEPRECATED RDW RBC AUTO: 37.9 FL (ref 37–54)
EGFRCR SERPLBLD CKD-EPI 2021: 111.1 ML/MIN/1.73
EOSINOPHIL # BLD AUTO: 0.14 10*3/MM3 (ref 0–0.4)
EOSINOPHIL NFR BLD AUTO: 1.8 % (ref 0.3–6.2)
ERYTHROCYTE [DISTWIDTH] IN BLOOD BY AUTOMATED COUNT: 12.6 % (ref 12.3–15.4)
ETHANOL BLD-MCNC: <10 MG/DL (ref 0–10)
ETHANOL UR QL: <0.01 %
FENTANYL UR-MCNC: NEGATIVE NG/ML
FLUAV RNA RESP QL NAA+PROBE: NOT DETECTED
FLUBV RNA ISLT QL NAA+PROBE: NOT DETECTED
GLOBULIN UR ELPH-MCNC: 2.8 GM/DL
GLUCOSE SERPL-MCNC: 112 MG/DL (ref 65–99)
GLUCOSE UR STRIP-MCNC: NEGATIVE MG/DL
HCT VFR BLD AUTO: 48 % (ref 37.5–51)
HGB BLD-MCNC: 15.9 G/DL (ref 13–17.7)
HGB UR QL STRIP.AUTO: NEGATIVE
IMM GRANULOCYTES # BLD AUTO: 0.02 10*3/MM3 (ref 0–0.05)
IMM GRANULOCYTES NFR BLD AUTO: 0.3 % (ref 0–0.5)
KETONES UR QL STRIP: NEGATIVE
LEUKOCYTE ESTERASE UR QL STRIP.AUTO: NEGATIVE
LYMPHOCYTES # BLD AUTO: 2.65 10*3/MM3 (ref 0.7–3.1)
LYMPHOCYTES NFR BLD AUTO: 33.5 % (ref 19.6–45.3)
MAGNESIUM SERPL-MCNC: 2.2 MG/DL (ref 1.6–2.6)
MCH RBC QN AUTO: 27.3 PG (ref 26.6–33)
MCHC RBC AUTO-ENTMCNC: 33.1 G/DL (ref 31.5–35.7)
MCV RBC AUTO: 82.5 FL (ref 79–97)
METHADONE UR QL SCN: NEGATIVE
MONOCYTES # BLD AUTO: 0.54 10*3/MM3 (ref 0.1–0.9)
MONOCYTES NFR BLD AUTO: 6.8 % (ref 5–12)
NEUTROPHILS NFR BLD AUTO: 4.54 10*3/MM3 (ref 1.7–7)
NEUTROPHILS NFR BLD AUTO: 57.2 % (ref 42.7–76)
NITRITE UR QL STRIP: NEGATIVE
NRBC BLD AUTO-RTO: 0 /100 WBC (ref 0–0.2)
OPIATES UR QL: NEGATIVE
OXYCODONE UR QL SCN: NEGATIVE
PCP UR QL SCN: NEGATIVE
PH UR STRIP.AUTO: 5.5 [PH] (ref 5–8)
PLATELET # BLD AUTO: 281 10*3/MM3 (ref 140–450)
PMV BLD AUTO: 8.9 FL (ref 6–12)
POTASSIUM SERPL-SCNC: 4.9 MMOL/L (ref 3.5–5.2)
PROPOXYPH UR QL: NEGATIVE
PROT SERPL-MCNC: 7.3 G/DL (ref 6–8.5)
PROT UR QL STRIP: NEGATIVE
RBC # BLD AUTO: 5.82 10*6/MM3 (ref 4.14–5.8)
SARS-COV-2 RNA RESP QL NAA+PROBE: NOT DETECTED
SODIUM SERPL-SCNC: 139 MMOL/L (ref 136–145)
SP GR UR STRIP: 1.02 (ref 1–1.03)
TRICYCLICS UR QL SCN: POSITIVE
UROBILINOGEN UR QL STRIP: NORMAL
WBC NRBC COR # BLD: 7.92 10*3/MM3 (ref 3.4–10.8)

## 2023-08-22 PROCEDURE — 99285 EMERGENCY DEPT VISIT HI MDM: CPT

## 2023-08-22 PROCEDURE — 81003 URINALYSIS AUTO W/O SCOPE: CPT | Performed by: PHYSICIAN ASSISTANT

## 2023-08-22 PROCEDURE — 80307 DRUG TEST PRSMV CHEM ANLYZR: CPT | Performed by: PHYSICIAN ASSISTANT

## 2023-08-22 PROCEDURE — 87636 SARSCOV2 & INF A&B AMP PRB: CPT | Performed by: PHYSICIAN ASSISTANT

## 2023-08-22 PROCEDURE — 36415 COLL VENOUS BLD VENIPUNCTURE: CPT

## 2023-08-22 PROCEDURE — 83735 ASSAY OF MAGNESIUM: CPT | Performed by: PHYSICIAN ASSISTANT

## 2023-08-22 PROCEDURE — 82077 ASSAY SPEC XCP UR&BREATH IA: CPT | Performed by: PHYSICIAN ASSISTANT

## 2023-08-22 PROCEDURE — 80053 COMPREHEN METABOLIC PANEL: CPT | Performed by: PHYSICIAN ASSISTANT

## 2023-08-22 PROCEDURE — 85025 COMPLETE CBC W/AUTO DIFF WBC: CPT | Performed by: PHYSICIAN ASSISTANT

## 2023-08-22 NOTE — ED PROVIDER NOTES
Subjective   History of Present Illness  23-year-old male presents secondary to need for psychiatric evaluation.  Patient states that he has bipolar.  He states that he feels that his Seroquel needs to be increased.  He states he is not sleeping well.  He states that he has been depressed.  He does have some suicidal thoughts at times however states that he does not feel he is a threat to himself or anyone else and does not have a plan.  He has a past medical history bipolar disorder.  He presents by private vehicle.    Review of Systems   Constitutional: Negative.  Negative for fever.   HENT: Negative.     Respiratory: Negative.     Cardiovascular: Negative.  Negative for chest pain.   Gastrointestinal: Negative.  Negative for abdominal pain.   Endocrine: Negative.    Genitourinary: Negative.  Negative for dysuria.   Skin: Negative.    Neurological: Negative.    Psychiatric/Behavioral:  Positive for suicidal ideas.    All other systems reviewed and are negative.    Past Medical History:   Diagnosis Date    Anxiety     Bipolar 1 disorder     Depression     PTSD (post-traumatic stress disorder)     Substance abuse        Allergies   Allergen Reactions    Capsicum Annuum Extract & Derivative (Bell Pepper) [Capsicum] Hives    Fish-Derived Products Anaphylaxis    Onion Anaphylaxis    Peanut-Containing Drug Products Hives    Tomato Itching       Past Surgical History:   Procedure Laterality Date    KNEE ACL RECONSTRUCTION Right     KNEE ACL RECONSTRUCTION Right 05/2016       History reviewed. No pertinent family history.    Social History     Socioeconomic History    Marital status: Single    Number of children: 1    Years of education: 12th grade    Highest education level: 12th grade   Tobacco Use    Smoking status: Former     Packs/day: 0.50     Types: Cigarettes    Smokeless tobacco: Current     Types: Snuff   Vaping Use    Vaping Use: Former   Substance and Sexual Activity    Alcohol use: Never     Comment: social  "   Drug use: Yes     Types: Methamphetamines, Cocaine(coke), \"Crack\" cocaine     Comment: reports used meth about three hours ago.    Sexual activity: Yes     Partners: Female           Objective   Physical Exam  Vitals and nursing note reviewed.   Constitutional:       General: He is not in acute distress.     Appearance: He is well-developed. He is not diaphoretic.   HENT:      Head: Normocephalic and atraumatic.      Right Ear: External ear normal.      Left Ear: External ear normal.      Nose: Nose normal.   Eyes:      Conjunctiva/sclera: Conjunctivae normal.      Pupils: Pupils are equal, round, and reactive to light.   Neck:      Vascular: No JVD.      Trachea: No tracheal deviation.   Cardiovascular:      Rate and Rhythm: Normal rate and regular rhythm.      Heart sounds: Normal heart sounds. No murmur heard.  Pulmonary:      Effort: Pulmonary effort is normal. No respiratory distress.      Breath sounds: Normal breath sounds. No wheezing.   Abdominal:      General: Bowel sounds are normal.      Palpations: Abdomen is soft.      Tenderness: There is no abdominal tenderness.   Musculoskeletal:         General: No deformity. Normal range of motion.      Cervical back: Normal range of motion and neck supple.   Skin:     General: Skin is warm and dry.      Coloration: Skin is not pale.      Findings: No erythema or rash.   Neurological:      Mental Status: He is alert and oriented to person, place, and time.      Cranial Nerves: No cranial nerve deficit.   Psychiatric:         Behavior: Behavior normal.         Thought Content: Thought content normal. Thought content does not include homicidal or suicidal plan.       Procedures           ED Course                Results for orders placed or performed during the hospital encounter of 08/22/23   COVID-19 and FLU A/B PCR - Swab, Nasopharynx    Specimen: Nasopharynx; Swab   Result Value Ref Range    COVID19 Not Detected Not Detected - Ref. Range    Influenza A PCR " Not Detected Not Detected    Influenza B PCR Not Detected Not Detected   Comprehensive Metabolic Panel    Specimen: Blood   Result Value Ref Range    Glucose 112 (H) 65 - 99 mg/dL    BUN 17 6 - 20 mg/dL    Creatinine 0.98 0.76 - 1.27 mg/dL    Sodium 139 136 - 145 mmol/L    Potassium 4.9 3.5 - 5.2 mmol/L    Chloride 105 98 - 107 mmol/L    CO2 24.7 22.0 - 29.0 mmol/L    Calcium 9.9 8.6 - 10.5 mg/dL    Total Protein 7.3 6.0 - 8.5 g/dL    Albumin 4.5 3.5 - 5.2 g/dL    ALT (SGPT) 219 (H) 1 - 41 U/L    AST (SGOT) 79 (H) 1 - 40 U/L    Alkaline Phosphatase 95 39 - 117 U/L    Total Bilirubin 0.4 0.0 - 1.2 mg/dL    Globulin 2.8 gm/dL    A/G Ratio 1.6 g/dL    BUN/Creatinine Ratio 17.3 7.0 - 25.0    Anion Gap 9.3 5.0 - 15.0 mmol/L    eGFR 111.1 >60.0 mL/min/1.73   Urinalysis With Microscopic If Indicated (No Culture) - Urine, Clean Catch    Specimen: Urine, Clean Catch   Result Value Ref Range    Color, UA Yellow Yellow, Straw    Appearance, UA Clear Clear    pH, UA 5.5 5.0 - 8.0    Specific Gravity, UA 1.021 1.005 - 1.030    Glucose, UA Negative Negative    Ketones, UA Negative Negative    Bilirubin, UA Negative Negative    Blood, UA Negative Negative    Protein, UA Negative Negative    Leuk Esterase, UA Negative Negative    Nitrite, UA Negative Negative    Urobilinogen, UA 0.2 E.U./dL 0.2 - 1.0 E.U./dL   Ethanol    Specimen: Blood   Result Value Ref Range    Ethanol <10 0 - 10 mg/dL    Ethanol % <0.010 %   Urine Drug Screen - Urine, Clean Catch    Specimen: Urine, Clean Catch   Result Value Ref Range    THC, Screen, Urine Negative Negative    Phencyclidine (PCP), Urine Negative Negative    Cocaine Screen, Urine Negative Negative    Methamphetamine, Ur Negative Negative    Opiate Screen Negative Negative    Amphetamine Screen, Urine Negative Negative    Benzodiazepine Screen, Urine Negative Negative    Tricyclic Antidepressants Screen Positive (A) Negative    Methadone Screen, Urine Negative Negative    Barbiturates Screen,  Urine Negative Negative    Oxycodone Screen, Urine Negative Negative    Propoxyphene Screen Negative Negative    Buprenorphine, Screen, Urine Negative Negative   Magnesium    Specimen: Blood   Result Value Ref Range    Magnesium 2.2 1.6 - 2.6 mg/dL   CBC Auto Differential    Specimen: Blood   Result Value Ref Range    WBC 7.92 3.40 - 10.80 10*3/mm3    RBC 5.82 (H) 4.14 - 5.80 10*6/mm3    Hemoglobin 15.9 13.0 - 17.7 g/dL    Hematocrit 48.0 37.5 - 51.0 %    MCV 82.5 79.0 - 97.0 fL    MCH 27.3 26.6 - 33.0 pg    MCHC 33.1 31.5 - 35.7 g/dL    RDW 12.6 12.3 - 15.4 %    RDW-SD 37.9 37.0 - 54.0 fl    MPV 8.9 6.0 - 12.0 fL    Platelets 281 140 - 450 10*3/mm3    Neutrophil % 57.2 42.7 - 76.0 %    Lymphocyte % 33.5 19.6 - 45.3 %    Monocyte % 6.8 5.0 - 12.0 %    Eosinophil % 1.8 0.3 - 6.2 %    Basophil % 0.4 0.0 - 1.5 %    Immature Grans % 0.3 0.0 - 0.5 %    Neutrophils, Absolute 4.54 1.70 - 7.00 10*3/mm3    Lymphocytes, Absolute 2.65 0.70 - 3.10 10*3/mm3    Monocytes, Absolute 0.54 0.10 - 0.90 10*3/mm3    Eosinophils, Absolute 0.14 0.00 - 0.40 10*3/mm3    Basophils, Absolute 0.03 0.00 - 0.20 10*3/mm3    Immature Grans, Absolute 0.02 0.00 - 0.05 10*3/mm3    nRBC 0.0 0.0 - 0.2 /100 WBC   Fentanyl, Urine - Urine, Clean Catch    Specimen: Urine, Clean Catch   Result Value Ref Range    Fentanyl, Urine Negative Negative     s                             Medical Decision Making  23-year-old male presents secondary to need for psychiatric evaluation.  Patient states that he has bipolar.  He states that he feels that his Seroquel needs to be increased.  He states he is not sleeping well.  He states that he has been depressed.  He does have some suicidal thoughts at times however states that he does not feel he is a threat to himself or anyone else and does not have a plan.  He has a past medical history bipolar disorder.  He presents by private vehicle.    Problems Addressed:  Bipolar disorder, current episode mixed, moderate:  complicated acute illness or injury    Amount and/or Complexity of Data Reviewed  Labs: ordered. Decision-making details documented in ED Course.  Discussion of management or test interpretation with external provider(s): On-call psychiatrist for the intake nurse.    Risk  Risk Details: Psychiatry recommended follow-up as an outpatient.  Patient had no active plan.  He states he has absolutely no threat to himself.        Final diagnoses:   Bipolar disorder, current episode mixed, moderate       ED Disposition  ED Disposition       ED Disposition   Discharge    Condition   --    Comment   Discharge home               ZEN GILBERT [address] [address] [phone]  Schedule an appointment as soon as possible for a visit            Medication List      No changes were made to your prescriptions during this visit.            David Castillo PA  08/22/23 1493

## 2023-08-22 NOTE — NURSING NOTE
Spoke with Dr. Hall regarding pt. Reviewed assessment and history. Recommends d/c to follow up with outpt provider at [facility] care for med adjustment.

## 2023-08-22 NOTE — NURSING NOTE
Left message for ED Andreas CORMIER to call regarding plan to discharge. Also Lead notified to see if we can provide transportation for this PT being discharged.

## 2023-08-22 NOTE — NURSING NOTE
"Assessment complete. Pt reports feeling like he needs his prozac dose increased. Pt states he has occasional thoughts of suicide, no plan, and describes it as a thought that comes and goes, with no plan, and no intent to actually harm self. Pt denies SI/HI at this time, states that he wants to get medicines lined out, before the SI becomes frequent or severe. Pt states \"I dont want to stay in the [facility], that's why I'm trying to get my medicine fixed now.\"    Denies SI/HI  Denies AVH  A&Ox4  Depression 4  Anxiety 0  "

## 2023-08-30 ENCOUNTER — HOSPITAL ENCOUNTER (EMERGENCY)
Facility: HOSPITAL | Age: 23
Discharge: HOME | End: 2023-08-30
Attending: STUDENT IN AN ORGANIZED HEALTH CARE EDUCATION/TRAINING PROGRAM
Payer: MEDICAID

## 2023-08-30 ENCOUNTER — APPOINTMENT (OUTPATIENT)
Dept: GENERAL RADIOLOGY | Facility: HOSPITAL | Age: 23
End: 2023-08-30
Payer: MEDICAID

## 2023-08-30 VITALS
HEIGHT: 73 IN | BODY MASS INDEX: 36.45 KG/M2 | DIASTOLIC BLOOD PRESSURE: 66 MMHG | TEMPERATURE: 98.1 F | HEART RATE: 85 BPM | OXYGEN SATURATION: 99 % | SYSTOLIC BLOOD PRESSURE: 135 MMHG | RESPIRATION RATE: 18 BRPM | WEIGHT: 275 LBS

## 2023-08-30 DIAGNOSIS — R07.9 CHEST PAIN, UNSPECIFIED TYPE: Primary | ICD-10-CM

## 2023-08-30 DIAGNOSIS — F19.10 SUBSTANCE ABUSE: ICD-10-CM

## 2023-08-30 DIAGNOSIS — R11.2 NAUSEA AND VOMITING, UNSPECIFIED VOMITING TYPE: ICD-10-CM

## 2023-08-30 LAB
ALBUMIN SERPL-MCNC: 4.3 G/DL (ref 3.5–5.2)
ALBUMIN/GLOB SERPL: 1.7 G/DL
ALP SERPL-CCNC: 93 U/L (ref 39–117)
ALT SERPL W P-5'-P-CCNC: 101 U/L (ref 1–41)
AMYLASE SERPL-CCNC: 28 U/L (ref 28–100)
ANION GAP SERPL CALCULATED.3IONS-SCNC: 11.5 MMOL/L (ref 5–15)
AST SERPL-CCNC: 40 U/L (ref 1–40)
BASOPHILS # BLD AUTO: 0.02 10*3/MM3 (ref 0–0.2)
BASOPHILS NFR BLD AUTO: 0.3 % (ref 0–1.5)
BILIRUB SERPL-MCNC: 0.7 MG/DL (ref 0–1.2)
BUN SERPL-MCNC: 8 MG/DL (ref 6–20)
BUN/CREAT SERPL: 8.6 (ref 7–25)
CALCIUM SPEC-SCNC: 9.4 MG/DL (ref 8.6–10.5)
CHLORIDE SERPL-SCNC: 106 MMOL/L (ref 98–107)
CO2 SERPL-SCNC: 23.5 MMOL/L (ref 22–29)
CREAT SERPL-MCNC: 0.93 MG/DL (ref 0.76–1.27)
DEPRECATED RDW RBC AUTO: 35.9 FL (ref 37–54)
EGFRCR SERPLBLD CKD-EPI 2021: 118.3 ML/MIN/1.73
EOSINOPHIL # BLD AUTO: 0.16 10*3/MM3 (ref 0–0.4)
EOSINOPHIL NFR BLD AUTO: 2.2 % (ref 0.3–6.2)
ERYTHROCYTE [DISTWIDTH] IN BLOOD BY AUTOMATED COUNT: 12.4 % (ref 12.3–15.4)
ETHANOL BLD-MCNC: <10 MG/DL (ref 0–10)
ETHANOL UR QL: <0.01 %
GLOBULIN UR ELPH-MCNC: 2.6 GM/DL
GLUCOSE SERPL-MCNC: 91 MG/DL (ref 65–99)
HCT VFR BLD AUTO: 42 % (ref 37.5–51)
HGB BLD-MCNC: 14.4 G/DL (ref 13–17.7)
HOLD SPECIMEN: NORMAL
HOLD SPECIMEN: NORMAL
IMM GRANULOCYTES # BLD AUTO: 0.02 10*3/MM3 (ref 0–0.05)
IMM GRANULOCYTES NFR BLD AUTO: 0.3 % (ref 0–0.5)
LYMPHOCYTES # BLD AUTO: 2.28 10*3/MM3 (ref 0.7–3.1)
LYMPHOCYTES NFR BLD AUTO: 31.5 % (ref 19.6–45.3)
MCH RBC QN AUTO: 27.6 PG (ref 26.6–33)
MCHC RBC AUTO-ENTMCNC: 34.3 G/DL (ref 31.5–35.7)
MCV RBC AUTO: 80.6 FL (ref 79–97)
MONOCYTES # BLD AUTO: 0.71 10*3/MM3 (ref 0.1–0.9)
MONOCYTES NFR BLD AUTO: 9.8 % (ref 5–12)
NEUTROPHILS NFR BLD AUTO: 4.04 10*3/MM3 (ref 1.7–7)
NEUTROPHILS NFR BLD AUTO: 55.9 % (ref 42.7–76)
NRBC BLD AUTO-RTO: 0 /100 WBC (ref 0–0.2)
PLATELET # BLD AUTO: 282 10*3/MM3 (ref 140–450)
PMV BLD AUTO: 9.4 FL (ref 6–12)
POTASSIUM SERPL-SCNC: 3.3 MMOL/L (ref 3.5–5.2)
PROT SERPL-MCNC: 6.9 G/DL (ref 6–8.5)
QT INTERVAL: 352 MS
QTC INTERVAL: 449 MS
RBC # BLD AUTO: 5.21 10*6/MM3 (ref 4.14–5.8)
SODIUM SERPL-SCNC: 141 MMOL/L (ref 136–145)
TROPONIN T SERPL HS-MCNC: <6 NG/L
WBC NRBC COR # BLD: 7.23 10*3/MM3 (ref 3.4–10.8)
WHOLE BLOOD HOLD COAG: NORMAL
WHOLE BLOOD HOLD SPECIMEN: NORMAL

## 2023-08-30 PROCEDURE — 85025 COMPLETE CBC W/AUTO DIFF WBC: CPT | Performed by: PHYSICIAN ASSISTANT

## 2023-08-30 PROCEDURE — 71045 X-RAY EXAM CHEST 1 VIEW: CPT | Performed by: RADIOLOGY

## 2023-08-30 PROCEDURE — 84484 ASSAY OF TROPONIN QUANT: CPT | Performed by: PHYSICIAN ASSISTANT

## 2023-08-30 PROCEDURE — 80053 COMPREHEN METABOLIC PANEL: CPT | Performed by: PHYSICIAN ASSISTANT

## 2023-08-30 PROCEDURE — 96374 THER/PROPH/DIAG INJ IV PUSH: CPT

## 2023-08-30 PROCEDURE — 25010000002 ONDANSETRON PER 1 MG: Performed by: PHYSICIAN ASSISTANT

## 2023-08-30 PROCEDURE — 99284 EMERGENCY DEPT VISIT MOD MDM: CPT

## 2023-08-30 PROCEDURE — 82077 ASSAY SPEC XCP UR&BREATH IA: CPT | Performed by: PHYSICIAN ASSISTANT

## 2023-08-30 PROCEDURE — 82150 ASSAY OF AMYLASE: CPT | Performed by: PHYSICIAN ASSISTANT

## 2023-08-30 PROCEDURE — 93005 ELECTROCARDIOGRAM TRACING: CPT | Performed by: PHYSICIAN ASSISTANT

## 2023-08-30 PROCEDURE — 71045 X-RAY EXAM CHEST 1 VIEW: CPT

## 2023-08-30 RX ORDER — ONDANSETRON 2 MG/ML
4 INJECTION INTRAMUSCULAR; INTRAVENOUS ONCE
Status: COMPLETED | OUTPATIENT
Start: 2023-08-30 | End: 2023-08-30

## 2023-08-30 RX ORDER — ONDANSETRON 4 MG/1
4 TABLET, FILM COATED ORAL EVERY 6 HOURS PRN
Qty: 15 TABLET | Refills: 0 | Status: SHIPPED | OUTPATIENT
Start: 2023-08-30

## 2023-08-30 RX ADMIN — ONDANSETRON 4 MG: 2 INJECTION INTRAMUSCULAR; INTRAVENOUS at 16:36

## 2023-08-30 RX ADMIN — SODIUM CHLORIDE 1000 ML: 9 INJECTION, SOLUTION INTRAVENOUS at 16:37

## 2023-08-30 NOTE — ED PROVIDER NOTES
Subjective   History of Present Illness  23-year-old male presents secondary to intermittent episodes of chest tightness with shortness of breath.  Patient denies any history of IV drug use.  He states he does use methamphetamine daily.  He states that he has had nausea along with vomiting since yesterday as well.  He denies any fever.  He states that he smokes and snorts methamphetamine.  He has a past medical history bipolar disorder, anxiety/depression, PTSD and substance abuse.  Patient presents by private vehicle.    Review of Systems   Constitutional: Negative.  Negative for fever.   HENT: Negative.     Respiratory:  Positive for chest tightness and shortness of breath.    Cardiovascular: Negative.  Negative for chest pain.   Gastrointestinal:  Positive for nausea and vomiting. Negative for abdominal pain.   Endocrine: Negative.    Genitourinary: Negative.  Negative for dysuria.   Skin: Negative.    Neurological: Negative.    Psychiatric/Behavioral: Negative.     All other systems reviewed and are negative.    Past Medical History:   Diagnosis Date    Anxiety     Bipolar 1 disorder     Depression     PTSD (post-traumatic stress disorder)     Substance abuse        Allergies   Allergen Reactions    Capsicum Annuum Extract & Derivative (Bell Pepper) [Capsicum] Hives    Fish-Derived Products Anaphylaxis    Onion Anaphylaxis    Peanut-Containing Drug Products Hives    Tomato Itching       Past Surgical History:   Procedure Laterality Date    KNEE ACL RECONSTRUCTION Right     KNEE ACL RECONSTRUCTION Right 05/2016       No family history on file.    Social History     Socioeconomic History    Marital status: Single    Number of children: 1    Years of education: 12th grade    Highest education level: 12th grade   Tobacco Use    Smoking status: Former     Packs/day: 0.50     Types: Cigarettes    Smokeless tobacco: Current     Types: Snuff   Vaping Use    Vaping Use: Former   Substance and Sexual Activity    Alcohol  "use: Never     Comment: social    Drug use: Yes     Types: Methamphetamines, Cocaine(coke), \"Crack\" cocaine     Comment: reports used meth about three hours ago.    Sexual activity: Yes     Partners: Female           Objective   Physical Exam  Vitals and nursing note reviewed.   Constitutional:       General: He is not in acute distress.     Appearance: He is well-developed. He is not diaphoretic.   HENT:      Head: Normocephalic and atraumatic.      Right Ear: External ear normal.      Left Ear: External ear normal.      Nose: Nose normal.   Eyes:      Conjunctiva/sclera: Conjunctivae normal.      Pupils: Pupils are equal, round, and reactive to light.   Neck:      Vascular: No JVD.      Trachea: No tracheal deviation.   Cardiovascular:      Rate and Rhythm: Normal rate and regular rhythm.      Heart sounds: Normal heart sounds. No murmur heard.  Pulmonary:      Effort: Pulmonary effort is normal. No respiratory distress.      Breath sounds: Normal breath sounds. No wheezing.   Abdominal:      General: Bowel sounds are normal.      Palpations: Abdomen is soft.      Tenderness: There is no abdominal tenderness.   Musculoskeletal:         General: No deformity. Normal range of motion.      Cervical back: Normal range of motion and neck supple.   Skin:     General: Skin is warm and dry.      Coloration: Skin is not pale.      Findings: No erythema or rash.   Neurological:      Mental Status: He is alert and oriented to person, place, and time.      Cranial Nerves: No cranial nerve deficit.   Psychiatric:         Behavior: Behavior normal.         Thought Content: Thought content normal.       Procedures           ED Course      Results for orders placed or performed during the hospital encounter of 08/30/23   Comprehensive Metabolic Panel    Specimen: Blood   Result Value Ref Range    Glucose 91 65 - 99 mg/dL    BUN 8 6 - 20 mg/dL    Creatinine 0.93 0.76 - 1.27 mg/dL    Sodium 141 136 - 145 mmol/L    Potassium 3.3 (L) " 3.5 - 5.2 mmol/L    Chloride 106 98 - 107 mmol/L    CO2 23.5 22.0 - 29.0 mmol/L    Calcium 9.4 8.6 - 10.5 mg/dL    Total Protein 6.9 6.0 - 8.5 g/dL    Albumin 4.3 3.5 - 5.2 g/dL    ALT (SGPT) 101 (H) 1 - 41 U/L    AST (SGOT) 40 1 - 40 U/L    Alkaline Phosphatase 93 39 - 117 U/L    Total Bilirubin 0.7 0.0 - 1.2 mg/dL    Globulin 2.6 gm/dL    A/G Ratio 1.7 g/dL    BUN/Creatinine Ratio 8.6 7.0 - 25.0    Anion Gap 11.5 5.0 - 15.0 mmol/L    eGFR 118.3 >60.0 mL/min/1.73   High Sensitivity Troponin T    Specimen: Blood   Result Value Ref Range    HS Troponin T <6 <15 ng/L   Amylase    Specimen: Blood   Result Value Ref Range    Amylase 28 28 - 100 U/L   Ethanol    Specimen: Blood   Result Value Ref Range    Ethanol <10 0 - 10 mg/dL    Ethanol % <0.010 %   CBC Auto Differential    Specimen: Blood   Result Value Ref Range    WBC 7.23 3.40 - 10.80 10*3/mm3    RBC 5.21 4.14 - 5.80 10*6/mm3    Hemoglobin 14.4 13.0 - 17.7 g/dL    Hematocrit 42.0 37.5 - 51.0 %    MCV 80.6 79.0 - 97.0 fL    MCH 27.6 26.6 - 33.0 pg    MCHC 34.3 31.5 - 35.7 g/dL    RDW 12.4 12.3 - 15.4 %    RDW-SD 35.9 (L) 37.0 - 54.0 fl    MPV 9.4 6.0 - 12.0 fL    Platelets 282 140 - 450 10*3/mm3    Neutrophil % 55.9 42.7 - 76.0 %    Lymphocyte % 31.5 19.6 - 45.3 %    Monocyte % 9.8 5.0 - 12.0 %    Eosinophil % 2.2 0.3 - 6.2 %    Basophil % 0.3 0.0 - 1.5 %    Immature Grans % 0.3 0.0 - 0.5 %    Neutrophils, Absolute 4.04 1.70 - 7.00 10*3/mm3    Lymphocytes, Absolute 2.28 0.70 - 3.10 10*3/mm3    Monocytes, Absolute 0.71 0.10 - 0.90 10*3/mm3    Eosinophils, Absolute 0.16 0.00 - 0.40 10*3/mm3    Basophils, Absolute 0.02 0.00 - 0.20 10*3/mm3    Immature Grans, Absolute 0.02 0.00 - 0.05 10*3/mm3    nRBC 0.0 0.0 - 0.2 /100 WBC   ECG 12 Lead Chest Pain   Result Value Ref Range    QT Interval 352 ms    QTC Interval 449 ms   Green Top (Gel)   Result Value Ref Range    Extra Tube Hold for add-ons.    Lavender Top   Result Value Ref Range    Extra Tube hold for add-on    Gold  Top - SST   Result Value Ref Range    Extra Tube Hold for add-ons.    Light Blue Top   Result Value Ref Range    Extra Tube Hold for add-ons.      XR Chest 1 View    Result Date: 8/30/2023    Unremarkable exam. No acute cardiopulmonary findings identified.  This report was finalized on 8/30/2023 4:10 PM by Dr. Salty Gamino MD.                     HEART Score: 1                      Medical Decision Making  23-year-old male presents secondary to intermittent episodes of chest tightness with shortness of breath.  Patient denies any history of IV drug use.  He states he does use methamphetamine daily.  He states that he has had nausea along with vomiting since yesterday as well.  He denies any fever.  He states that he smokes and snorts methamphetamine.  He has a past medical history bipolar disorder, anxiety/depression, PTSD and substance abuse.  Patient presents by private vehicle.    Problems Addressed:  Chest pain, unspecified type: complicated acute illness or injury  Nausea and vomiting, unspecified vomiting type: complicated acute illness or injury  Substance abuse: complicated acute illness or injury    Amount and/or Complexity of Data Reviewed  Labs: ordered. Decision-making details documented in ED Course.  Radiology: ordered. Decision-making details documented in ED Course.  ECG/medicine tests: ordered. Decision-making details documented in ED Course.    Risk  Prescription drug management.  Risk Details: Patient was counseled on his diagnostic work-up and labs.  He was counseled about stopping methamphetamine.  He was counseled on the signs and symptoms worsening on appropriate follow-up.  He voices understanding.        Final diagnoses:   Chest pain, unspecified type   Substance abuse   Nausea and vomiting, unspecified vomiting type       ED Disposition  ED Disposition       ED Disposition   Discharge    Condition   Stable    Comment   --               PATIENT CONNECTION - [facility]  See Provider  List  [place] [address]  [phone]  Schedule an appointment as soon as possible for a visit       Miles Ingram MD  [address] [place] [address]  [phone]    Schedule an appointment as soon as possible for a visit            Medication List        New Prescriptions      ondansetron 4 MG tablet  Commonly known as: ZOFRAN  Take 1 tablet by mouth Every 6 (Six) Hours As Needed for Nausea or Vomiting.               Where to Get Your Medications        You can get these medications from any pharmacy    Bring a paper prescription for each of these medications  ondansetron 4 MG tablet            David Castillo PA  08/30/23 0297

## 2023-09-05 ENCOUNTER — APPOINTMENT (OUTPATIENT)
Dept: GENERAL RADIOLOGY | Facility: HOSPITAL | Age: 23
End: 2023-09-05
Payer: MEDICAID

## 2023-09-05 ENCOUNTER — HOSPITAL ENCOUNTER (EMERGENCY)
Facility: HOSPITAL | Age: 23
Discharge: HOME | End: 2023-09-06
Attending: STUDENT IN AN ORGANIZED HEALTH CARE EDUCATION/TRAINING PROGRAM
Payer: MEDICAID

## 2023-09-05 VITALS
TEMPERATURE: 97.7 F | HEIGHT: 73 IN | BODY MASS INDEX: 33.85 KG/M2 | HEART RATE: 93 BPM | SYSTOLIC BLOOD PRESSURE: 124 MMHG | DIASTOLIC BLOOD PRESSURE: 84 MMHG | RESPIRATION RATE: 19 BRPM | WEIGHT: 255.4 LBS | OXYGEN SATURATION: 97 %

## 2023-09-05 DIAGNOSIS — F15.10 METHAMPHETAMINE ABUSE: Primary | ICD-10-CM

## 2023-09-05 PROCEDURE — 99285 EMERGENCY DEPT VISIT HI MDM: CPT

## 2023-09-05 PROCEDURE — 71045 X-RAY EXAM CHEST 1 VIEW: CPT

## 2023-09-05 PROCEDURE — 71045 X-RAY EXAM CHEST 1 VIEW: CPT | Performed by: RADIOLOGY

## 2023-09-05 PROCEDURE — 93005 ELECTROCARDIOGRAM TRACING: CPT | Performed by: STUDENT IN AN ORGANIZED HEALTH CARE EDUCATION/TRAINING PROGRAM

## 2023-09-06 LAB
ALBUMIN SERPL-MCNC: 4.6 G/DL (ref 3.5–5.2)
ALBUMIN/GLOB SERPL: 1.6 G/DL
ALP SERPL-CCNC: 92 U/L (ref 39–117)
ALT SERPL W P-5'-P-CCNC: 90 U/L (ref 1–41)
ANION GAP SERPL CALCULATED.3IONS-SCNC: 13.4 MMOL/L (ref 5–15)
AST SERPL-CCNC: 39 U/L (ref 1–40)
BASOPHILS # BLD AUTO: 0.02 10*3/MM3 (ref 0–0.2)
BASOPHILS NFR BLD AUTO: 0.2 % (ref 0–1.5)
BILIRUB SERPL-MCNC: 0.8 MG/DL (ref 0–1.2)
BUN SERPL-MCNC: 9 MG/DL (ref 6–20)
BUN/CREAT SERPL: 8 (ref 7–25)
CALCIUM SPEC-SCNC: 10.2 MG/DL (ref 8.6–10.5)
CHLORIDE SERPL-SCNC: 106 MMOL/L (ref 98–107)
CO2 SERPL-SCNC: 24.6 MMOL/L (ref 22–29)
CREAT SERPL-MCNC: 1.12 MG/DL (ref 0.76–1.27)
DEPRECATED RDW RBC AUTO: 36 FL (ref 37–54)
EGFRCR SERPLBLD CKD-EPI 2021: 94.7 ML/MIN/1.73
EOSINOPHIL # BLD AUTO: 0.09 10*3/MM3 (ref 0–0.4)
EOSINOPHIL NFR BLD AUTO: 0.9 % (ref 0.3–6.2)
ERYTHROCYTE [DISTWIDTH] IN BLOOD BY AUTOMATED COUNT: 12.6 % (ref 12.3–15.4)
ETHANOL BLD-MCNC: <10 MG/DL (ref 0–10)
ETHANOL UR QL: <0.01 %
GLOBULIN UR ELPH-MCNC: 2.9 GM/DL
GLUCOSE SERPL-MCNC: 130 MG/DL (ref 65–99)
HCT VFR BLD AUTO: 47.4 % (ref 37.5–51)
HGB BLD-MCNC: 16.1 G/DL (ref 13–17.7)
IMM GRANULOCYTES # BLD AUTO: 0.02 10*3/MM3 (ref 0–0.05)
IMM GRANULOCYTES NFR BLD AUTO: 0.2 % (ref 0–0.5)
LYMPHOCYTES # BLD AUTO: 3.44 10*3/MM3 (ref 0.7–3.1)
LYMPHOCYTES NFR BLD AUTO: 35.8 % (ref 19.6–45.3)
MAGNESIUM SERPL-MCNC: 2.1 MG/DL (ref 1.6–2.6)
MCH RBC QN AUTO: 27.3 PG (ref 26.6–33)
MCHC RBC AUTO-ENTMCNC: 34 G/DL (ref 31.5–35.7)
MCV RBC AUTO: 80.5 FL (ref 79–97)
MONOCYTES # BLD AUTO: 0.73 10*3/MM3 (ref 0.1–0.9)
MONOCYTES NFR BLD AUTO: 7.6 % (ref 5–12)
NEUTROPHILS NFR BLD AUTO: 5.31 10*3/MM3 (ref 1.7–7)
NEUTROPHILS NFR BLD AUTO: 55.3 % (ref 42.7–76)
NRBC BLD AUTO-RTO: 0 /100 WBC (ref 0–0.2)
PLATELET # BLD AUTO: 346 10*3/MM3 (ref 140–450)
PMV BLD AUTO: 9.3 FL (ref 6–12)
POTASSIUM SERPL-SCNC: 3.8 MMOL/L (ref 3.5–5.2)
PROT SERPL-MCNC: 7.5 G/DL (ref 6–8.5)
RBC # BLD AUTO: 5.89 10*6/MM3 (ref 4.14–5.8)
SODIUM SERPL-SCNC: 144 MMOL/L (ref 136–145)
TROPONIN T SERPL HS-MCNC: <6 NG/L
WBC NRBC COR # BLD: 9.61 10*3/MM3 (ref 3.4–10.8)

## 2023-09-06 PROCEDURE — 84484 ASSAY OF TROPONIN QUANT: CPT | Performed by: STUDENT IN AN ORGANIZED HEALTH CARE EDUCATION/TRAINING PROGRAM

## 2023-09-06 PROCEDURE — 82077 ASSAY SPEC XCP UR&BREATH IA: CPT | Performed by: STUDENT IN AN ORGANIZED HEALTH CARE EDUCATION/TRAINING PROGRAM

## 2023-09-06 PROCEDURE — 36415 COLL VENOUS BLD VENIPUNCTURE: CPT

## 2023-09-06 PROCEDURE — 85025 COMPLETE CBC W/AUTO DIFF WBC: CPT | Performed by: STUDENT IN AN ORGANIZED HEALTH CARE EDUCATION/TRAINING PROGRAM

## 2023-09-06 PROCEDURE — 80053 COMPREHEN METABOLIC PANEL: CPT | Performed by: STUDENT IN AN ORGANIZED HEALTH CARE EDUCATION/TRAINING PROGRAM

## 2023-09-06 PROCEDURE — 83735 ASSAY OF MAGNESIUM: CPT | Performed by: STUDENT IN AN ORGANIZED HEALTH CARE EDUCATION/TRAINING PROGRAM

## 2023-09-06 NOTE — ED NOTES
PT was found laying in floor of 3rd triage breathing heavily. When asked if he was ok, he said he was having an anxiety attack. Provider notified

## 2023-09-06 NOTE — ED NOTES
"Called patient back into lobby to get his discharged papers.  Patient states\" you aren't doing a fucking thing for me I'm going to leave here and you will find me dead in the morning in the fucking ditch\".  Patient encouraged to come back into the ED he stormed off stating \"it is on me if they find him dead in the morning\"  Security notified.  They have left to go locate the patient.   "

## 2023-09-06 NOTE — ED NOTES
PT came to  and stated that he needs to be seen or he was going to leave and overdose on meth or heroine. Lead RN notified.

## 2023-09-06 NOTE — NURSING NOTE
Pt brought back to unit by ED lead, Charisse,and security and advised that pt reported we told him he had to leave intake. RN, advised lead,that pt was not told he had to leave intake and there were witnesses present, Paulo babb, nurse, Kary, and Colin erwin.Pt was irritated and argumentative regarding his homeless situation. RN advised that pt was given choice to stay back here until medical cleared or go to lobby and he left unit of on accord.     2348 - [facility] spoke with Dr. Jauregui, who advised he is working on pt discharge and pt can to to lobby and wait for discharge if he chooses.     2350 - Pt not on a hold. Pt left unit ambulatory.

## 2023-09-06 NOTE — NURSING NOTE
"Pt to intake. Pt advised he came to detox from meth. Pt denies SI/HI/AVH. Pt oriented appropriately. Pt was advised that hospital does not do a detox from meth. Pt does not want intake process. Pt advised he wanted to be in ER. RN spoke with ED Provider/Jauregui, made aware and MD advised he would take care of it.    5193 -   RN contacted Dr. Bradley and made aware of pt presentation and wishes to \"detox from meth\". Pt denies SI/HI/AVH. MD advised pt can be discharged from psych standpoint. Pt provided with resource pkt for rehabs.   "

## 2023-09-06 NOTE — ED PROVIDER NOTES
"Subjective   History of Present Illness  23-year-old male with past medical history of bipolar disorder, substance abuse, and depression presents to the ER for possible detox.  Patient also notes chest pain.  Confirmed shortness of breath.  Patient confirms active polysubstance abuse.  Patient wished to be evaluated by behavioral intake as well.  Vital stable.  Afebrile.  Denied suicidal or homicidal ideations.    Review of Systems   Respiratory:  Positive for shortness of breath.    Cardiovascular:  Positive for chest pain.   All other systems reviewed and are negative.    Past Medical History:   Diagnosis Date    Anxiety     Bipolar 1 disorder     Depression     PTSD (post-traumatic stress disorder)     Substance abuse        Allergies   Allergen Reactions    Capsicum Annuum Extract & Derivative (Bell Pepper) [Capsicum] Hives    Fish-Derived Products Anaphylaxis    Onion Anaphylaxis    Peanut-Containing Drug Products Hives    Tomato Itching       Past Surgical History:   Procedure Laterality Date    KNEE ACL RECONSTRUCTION Right     KNEE ACL RECONSTRUCTION Right 05/2016       No family history on file.    Social History     Socioeconomic History    Marital status: Single    Number of children: 1    Years of education: 12th grade    Highest education level: 12th grade   Tobacco Use    Smoking status: Former     Packs/day: 0.50     Types: Cigarettes    Smokeless tobacco: Current     Types: Snuff   Vaping Use    Vaping Use: Former   Substance and Sexual Activity    Alcohol use: Never     Comment: social    Drug use: Yes     Types: Methamphetamines, Cocaine(coke), \"Crack\" cocaine     Comment: reports used meth about three hours ago.    Sexual activity: Yes     Partners: Female           Objective   Physical Exam  Constitutional:       General: He is not in acute distress.     Appearance: He is well-developed. He is not ill-appearing.   HENT:      Head: Normocephalic and atraumatic.   Eyes:      Extraocular Movements: " Extraocular movements intact.      Pupils: Pupils are equal, round, and reactive to light.   Neck:      Vascular: No JVD.   Cardiovascular:      Rate and Rhythm: Normal rate and regular rhythm.      Heart sounds: Normal heart sounds. No murmur heard.  Pulmonary:      Effort: No tachypnea, accessory muscle usage or respiratory distress.      Breath sounds: Normal breath sounds. No stridor. No decreased breath sounds, wheezing, rhonchi or rales.   Chest:      Chest wall: No deformity, tenderness or crepitus.   Abdominal:      General: Bowel sounds are normal.      Palpations: Abdomen is soft.      Tenderness: There is no abdominal tenderness. There is no guarding or rebound.   Musculoskeletal:         General: Normal range of motion.      Cervical back: Normal range of motion and neck supple.      Right lower leg: No tenderness. No edema.      Left lower leg: No tenderness. No edema.   Lymphadenopathy:      Cervical: No cervical adenopathy.   Skin:     General: Skin is warm and dry.      Coloration: Skin is not cyanotic.      Findings: No ecchymosis or erythema.   Neurological:      General: No focal deficit present.      Mental Status: He is alert and oriented to person, place, and time.      Cranial Nerves: No cranial nerve deficit.      Motor: No weakness.   Psychiatric:         Mood and Affect: Mood normal. Mood is not anxious.         Behavior: Behavior normal. Behavior is not agitated.       Procedures           ED Course  ED Course as of 09/06/23 0255   Tue Sep 05, 2023   2303 EKG notes sinus tachycardia.  127 bpm.  No acute ST elevation.  QTc 459.  Electronically signed by Master Jauregui DO, 09/05/23, 11:03 PM EDT.   [SF]      ED Course User Index  [SF] Master Jauregui DO                                           Medical Decision Making  CBC and CMP unremarkable.  Chest x-ray unremarkable.  EKG unremarkable.  Troponin unremarkable.  Patient was evaluated by behavioral [facility].  He was informed by the  behavioral health staff that they do not do methamphetamine detox.  Patient denied suicidality or homicidality with behavioral health.     The patient denied suicidality or homicidality on 3 different occasions.  Patient noted he constantly abuses methamphetamines.  Patient is wishing to seek inpatient recovery.  I informed the patient that this was not available at this facility.  Patient was unwilling to accept outpatient paperwork and possible outpatient referral.  I informed the patient of his negative chest pain work-up with negative EKG and troponins.  Patient refused to give urine sample.    Work up and results were discussed throughly with the patient.  The patient will be discharged for further monitoring and management with their PCP.  Red flags, warning signs, worsening symptoms, and when to return to the ER discussed with and understood by the patient.  Patient will follow up with their PCP in a timely manner.  Vitals stable at discharge.    Amount and/or Complexity of Data Reviewed  Labs: ordered. Decision-making details documented in ED Course.  Radiology: ordered. Decision-making details documented in ED Course.  ECG/medicine tests: ordered.        Final diagnoses:   Methamphetamine abuse       ED Disposition  ED Disposition       ED Disposition   Discharge    Condition   Stable    Comment   --               No follow-up provider specified.       Medication List      No changes were made to your prescriptions during this visit.            Master Jauregui,   09/06/23 0256

## 2023-09-06 NOTE — ED NOTES
Pt got very upset with triage nurse when he was told that we did not have a room for him to go to at this time. He again stated that he was going to leave and overdose on meth or heroine. Lead RN notified.

## 2023-09-06 NOTE — NURSING NOTE
"Pt left ambulatory from intake. RN explained that ED provider was extremely busy but was still in medical review process. Pt upset that he is homeless and no help. Pt was provided with all resource numbers. RN ask where pt had been staying. Pt replied \"around\". Pt did not present with any personal items, other than phone and . Pt then reported he had been living in a tent. RN ask if pt had been to any rehabs. Pt reported he had been at [facility] two weeks ago and that it is a two day intake process to go back. Pt appeared irritated that staff was not immediately providing him with housing. Pt left intake ambulatory.    RN notified ED lead, Charisse and Dr. Jauregui.   "

## 2023-09-06 NOTE — ED NOTES
PT was heard yelling and making noise from 3rd triage. Went and asked patient what was wrong. He stated that he is about to have an anxiety attack. I told him that the provider was going to come see him in just a minute but that we were busy at the moment.

## 2023-09-07 LAB
QT INTERVAL: 316 MS
QTC INTERVAL: 459 MS

## 2023-12-04 ENCOUNTER — HOSPITAL ENCOUNTER (EMERGENCY)
Facility: HOSPITAL | Age: 23
Discharge: HOME | DRG: 885 | End: 2023-12-04
Attending: EMERGENCY MEDICINE | Admitting: EMERGENCY MEDICINE
Payer: MEDICAID

## 2023-12-04 ENCOUNTER — HOSPITAL ENCOUNTER (INPATIENT)
Facility: HOSPITAL | Age: 23
LOS: 3 days | Discharge: HOME | DRG: 885 | End: 2023-12-07
Attending: PSYCHIATRY & NEUROLOGY | Admitting: PSYCHIATRY & NEUROLOGY
Payer: MEDICAID

## 2023-12-04 VITALS
OXYGEN SATURATION: 99 % | DIASTOLIC BLOOD PRESSURE: 78 MMHG | HEART RATE: 96 BPM | TEMPERATURE: 98.3 F | RESPIRATION RATE: 18 BRPM | BODY MASS INDEX: 35.04 KG/M2 | HEIGHT: 73 IN | SYSTOLIC BLOOD PRESSURE: 133 MMHG | WEIGHT: 264.4 LBS

## 2023-12-04 DIAGNOSIS — R45.851 PASSIVE SUICIDAL IDEATIONS: Primary | ICD-10-CM

## 2023-12-04 PROBLEM — F29 PSYCHOSIS, UNSPECIFIED PSYCHOSIS TYPE: Status: ACTIVE | Noted: 2023-12-04

## 2023-12-04 LAB
ALBUMIN SERPL-MCNC: 4.1 G/DL (ref 3.5–5.2)
ALBUMIN/GLOB SERPL: 1.6 G/DL
ALP SERPL-CCNC: 87 U/L (ref 39–117)
ALT SERPL W P-5'-P-CCNC: 54 U/L (ref 1–41)
AMPHET+METHAMPHET UR QL: POSITIVE
ANION GAP SERPL CALCULATED.3IONS-SCNC: 9.6 MMOL/L (ref 5–15)
APAP SERPL-MCNC: <5 MCG/ML (ref 0–30)
AST SERPL-CCNC: 28 U/L (ref 1–40)
BARBITURATES UR QL SCN: NEGATIVE
BASOPHILS # BLD AUTO: 0.02 10*3/MM3 (ref 0–0.2)
BASOPHILS NFR BLD AUTO: 0.3 % (ref 0–1.5)
BENZODIAZ UR QL SCN: NEGATIVE
BILIRUB SERPL-MCNC: 0.4 MG/DL (ref 0–1.2)
BUN SERPL-MCNC: 13 MG/DL (ref 6–20)
BUN/CREAT SERPL: 14.3 (ref 7–25)
CALCIUM SPEC-SCNC: 9.5 MG/DL (ref 8.6–10.5)
CANNABINOIDS SERPL QL: NEGATIVE
CHLORIDE SERPL-SCNC: 106 MMOL/L (ref 98–107)
CO2 SERPL-SCNC: 26.4 MMOL/L (ref 22–29)
COCAINE UR QL: NEGATIVE
CREAT SERPL-MCNC: 0.91 MG/DL (ref 0.76–1.27)
DEPRECATED RDW RBC AUTO: 37.5 FL (ref 37–54)
EGFRCR SERPLBLD CKD-EPI 2021: 121.5 ML/MIN/1.73
EOSINOPHIL # BLD AUTO: 0.13 10*3/MM3 (ref 0–0.4)
EOSINOPHIL NFR BLD AUTO: 2 % (ref 0.3–6.2)
ERYTHROCYTE [DISTWIDTH] IN BLOOD BY AUTOMATED COUNT: 12.7 % (ref 12.3–15.4)
ETHANOL BLD-MCNC: <10 MG/DL (ref 0–10)
ETHANOL UR QL: <0.01 %
FENTANYL UR-MCNC: NEGATIVE NG/ML
GLOBULIN UR ELPH-MCNC: 2.5 GM/DL
GLUCOSE SERPL-MCNC: 105 MG/DL (ref 65–99)
HCT VFR BLD AUTO: 43.8 % (ref 37.5–51)
HGB BLD-MCNC: 14.4 G/DL (ref 13–17.7)
HOLD SPECIMEN: NORMAL
HOLD SPECIMEN: NORMAL
IMM GRANULOCYTES # BLD AUTO: 0.02 10*3/MM3 (ref 0–0.05)
IMM GRANULOCYTES NFR BLD AUTO: 0.3 % (ref 0–0.5)
LYMPHOCYTES # BLD AUTO: 1.87 10*3/MM3 (ref 0.7–3.1)
LYMPHOCYTES NFR BLD AUTO: 29.4 % (ref 19.6–45.3)
MCH RBC QN AUTO: 26.9 PG (ref 26.6–33)
MCHC RBC AUTO-ENTMCNC: 32.9 G/DL (ref 31.5–35.7)
MCV RBC AUTO: 81.7 FL (ref 79–97)
METHADONE UR QL SCN: NEGATIVE
MONOCYTES # BLD AUTO: 0.46 10*3/MM3 (ref 0.1–0.9)
MONOCYTES NFR BLD AUTO: 7.2 % (ref 5–12)
NEUTROPHILS NFR BLD AUTO: 3.86 10*3/MM3 (ref 1.7–7)
NEUTROPHILS NFR BLD AUTO: 60.8 % (ref 42.7–76)
NRBC BLD AUTO-RTO: 0 /100 WBC (ref 0–0.2)
OPIATES UR QL: NEGATIVE
OXYCODONE UR QL SCN: NEGATIVE
PLATELET # BLD AUTO: 287 10*3/MM3 (ref 140–450)
PMV BLD AUTO: 9.2 FL (ref 6–12)
POTASSIUM SERPL-SCNC: 4.6 MMOL/L (ref 3.5–5.2)
PROT SERPL-MCNC: 6.6 G/DL (ref 6–8.5)
RBC # BLD AUTO: 5.36 10*6/MM3 (ref 4.14–5.8)
SALICYLATES SERPL-MCNC: <0.3 MG/DL
SODIUM SERPL-SCNC: 142 MMOL/L (ref 136–145)
WBC NRBC COR # BLD AUTO: 6.36 10*3/MM3 (ref 3.4–10.8)
WHOLE BLOOD HOLD COAG: NORMAL
WHOLE BLOOD HOLD SPECIMEN: NORMAL

## 2023-12-04 PROCEDURE — 80307 DRUG TEST PRSMV CHEM ANLYZR: CPT | Performed by: EMERGENCY MEDICINE

## 2023-12-04 PROCEDURE — 99283 EMERGENCY DEPT VISIT LOW MDM: CPT

## 2023-12-04 PROCEDURE — 36415 COLL VENOUS BLD VENIPUNCTURE: CPT

## 2023-12-04 PROCEDURE — 80179 DRUG ASSAY SALICYLATE: CPT

## 2023-12-04 PROCEDURE — 80053 COMPREHEN METABOLIC PANEL: CPT

## 2023-12-04 PROCEDURE — 82077 ASSAY SPEC XCP UR&BREATH IA: CPT

## 2023-12-04 PROCEDURE — 80143 DRUG ASSAY ACETAMINOPHEN: CPT

## 2023-12-04 PROCEDURE — 85025 COMPLETE CBC W/AUTO DIFF WBC: CPT

## 2023-12-04 PROCEDURE — 99285 EMERGENCY DEPT VISIT HI MDM: CPT

## 2023-12-04 RX ORDER — ACETAMINOPHEN 325 MG/1
650 TABLET ORAL EVERY 4 HOURS PRN
Status: DISCONTINUED | OUTPATIENT
Start: 2023-12-04 | End: 2023-12-07 | Stop reason: HOSPADM

## 2023-12-04 RX ORDER — ALUMINA, MAGNESIA, AND SIMETHICONE 2400; 2400; 240 MG/30ML; MG/30ML; MG/30ML
15 SUSPENSION ORAL EVERY 6 HOURS PRN
Status: DISCONTINUED | OUTPATIENT
Start: 2023-12-04 | End: 2023-12-07 | Stop reason: HOSPADM

## 2023-12-04 RX ORDER — DIPHENHYDRAMINE HYDROCHLORIDE 50 MG/ML
50 INJECTION INTRAMUSCULAR; INTRAVENOUS EVERY 4 HOURS PRN
Status: DISCONTINUED | OUTPATIENT
Start: 2023-12-04 | End: 2023-12-07 | Stop reason: HOSPADM

## 2023-12-04 RX ORDER — DIPHENHYDRAMINE HCL 50 MG
50 CAPSULE ORAL EVERY 4 HOURS PRN
Status: DISCONTINUED | OUTPATIENT
Start: 2023-12-04 | End: 2023-12-07 | Stop reason: HOSPADM

## 2023-12-04 RX ORDER — HALOPERIDOL 5 MG/ML
5 INJECTION INTRAMUSCULAR EVERY 4 HOURS PRN
Status: DISCONTINUED | OUTPATIENT
Start: 2023-12-04 | End: 2023-12-07 | Stop reason: HOSPADM

## 2023-12-04 RX ORDER — NICOTINE 21 MG/24HR
1 PATCH, TRANSDERMAL 24 HOURS TRANSDERMAL DAILY PRN
Status: DISCONTINUED | OUTPATIENT
Start: 2023-12-04 | End: 2023-12-07 | Stop reason: HOSPADM

## 2023-12-04 RX ORDER — SODIUM CHLORIDE 0.9 % (FLUSH) 0.9 %
10 SYRINGE (ML) INJECTION AS NEEDED
Status: DISCONTINUED | OUTPATIENT
Start: 2023-12-04 | End: 2023-12-04 | Stop reason: HOSPADM

## 2023-12-04 RX ORDER — DIPHENHYDRAMINE HYDROCHLORIDE 50 MG/ML
50 INJECTION INTRAMUSCULAR; INTRAVENOUS EVERY 4 HOURS PRN
Status: DISCONTINUED | OUTPATIENT
Start: 2023-12-04 | End: 2023-12-04 | Stop reason: SDUPTHER

## 2023-12-04 RX ORDER — HYDROXYZINE PAMOATE 50 MG/1
50 CAPSULE ORAL EVERY 6 HOURS PRN
Status: DISCONTINUED | OUTPATIENT
Start: 2023-12-04 | End: 2023-12-07 | Stop reason: HOSPADM

## 2023-12-04 RX ORDER — LORAZEPAM 2 MG/1
2 TABLET ORAL EVERY 4 HOURS PRN
Status: DISCONTINUED | OUTPATIENT
Start: 2023-12-04 | End: 2023-12-07 | Stop reason: HOSPADM

## 2023-12-04 RX ORDER — LORAZEPAM 2 MG/ML
2 INJECTION INTRAMUSCULAR EVERY 4 HOURS PRN
Status: DISCONTINUED | OUTPATIENT
Start: 2023-12-04 | End: 2023-12-07 | Stop reason: HOSPADM

## 2023-12-04 RX ORDER — LOPERAMIDE HYDROCHLORIDE 2 MG/1
2 CAPSULE ORAL
Status: DISCONTINUED | OUTPATIENT
Start: 2023-12-04 | End: 2023-12-07 | Stop reason: HOSPADM

## 2023-12-04 RX ORDER — TRAZODONE HYDROCHLORIDE 50 MG/1
100 TABLET ORAL NIGHTLY PRN
Status: DISCONTINUED | OUTPATIENT
Start: 2023-12-04 | End: 2023-12-07 | Stop reason: HOSPADM

## 2023-12-04 RX ORDER — IBUPROFEN 400 MG/1
400 TABLET ORAL EVERY 6 HOURS PRN
Status: DISCONTINUED | OUTPATIENT
Start: 2023-12-04 | End: 2023-12-07 | Stop reason: HOSPADM

## 2023-12-04 RX ORDER — HALOPERIDOL 5 MG/1
5 TABLET ORAL EVERY 4 HOURS PRN
Status: DISCONTINUED | OUTPATIENT
Start: 2023-12-04 | End: 2023-12-07 | Stop reason: HOSPADM

## 2023-12-04 RX ADMIN — TRAZODONE HYDROCHLORIDE 100 MG: 50 TABLET ORAL at 23:36

## 2023-12-04 RX ADMIN — IBUPROFEN 400 MG: 400 TABLET, FILM COATED ORAL at 23:36

## 2023-12-04 NOTE — SIGNIFICANT NOTE
12/04/23 1528   Behavior WDL   Behavior WDL interactions   Interactions cooperative;other (see comments)  (high energy/difficult to focus)   Emotion Mood WDL   Emotion/Mood/Affect WDL affect;emotion mood;X   Affect affect consistent with mood   Emotion/Mood expansive;euphoric   Speech WDL   Speech WDL speech;X   Speech hyper verbal;verbal tics   Perceptual State WDL   Perceptual State WDL hallucinations;perceptual state;WDL   Hallucinations denies hallucinations   Perceptual State consistent with reality   Thought Process WDL   Thought Process WDL delusions;judgment and insight;thought content;thought process;X   Delusions no delusions   Judgment and Insight judgment appropriate to situation;insight appropriate to situation   Thought Content suicidal thoughts   Thought Process relevant   Intellectual Performance WDL   Intellectual Performance WDL intellectual performance;level of consciousness;WDL   Intellectual Performance able to comprehend;immediate, recent, and remote memory intact   Level of Consciousness Alert   Coping/Stress   Major Change/Loss/Stressor chemical dependency/abuse;housing concerns;mental health condition;employment concerns;limited support system   Patient Personal Strengths self-reliant;assertive   Sources of Support community support   Techniques to [place] with Loss/Stress/Change substance use   Reaction to Health Status adjusting   C-SSRS (Recent)   Q1 Wished to be Dead (Past Month) yes   Q2 Suicidal Thoughts (Past Month) yes   Q3 Suicidal Thought Method no   Q4 Suicidal Intent without Specific Plan no   Q5 Suicide Intent with Specific Plan no   Q6 Suicide Behavior (Lifetime) yes   Within the past 3 Months? no   Level of Risk per Screen moderate risk   Violence Risk   Feels Like Hurting Others no   Previous Attempt to Harm Others no     JOANA met with pt at bedside this date at the request of the provider. JOANA notes that upon review of pts history he appears to be homeless and frequents  "inpatient hospitals regarding SI thoughts with no outpatient follow up. Pt denied any support system or family and states that he considers \"[place], [place]\" home. Pt reports that he is here to go to [facility] this date. Pt reports that he called prior to coming in and was told he met criteria to go to [email or web address]. SW explained the inpatient process this date and pt requested to go to [facility]. Pt reports that he last used meth and heroin on 12/3/23. Pt reports that he came to the ED this date from [email or web address] but does not like it at [email or web address] and needs a new treatment option. Pt reports that he has been to [facility] before. Pt denied current SI/HI and hallucinations. Pt reports that he is still experiencing some SI thoughts, however, they are not as intense and are not associated with any plan because he has been able to be here in the ED and speak with someone. JOANA notes that pt is very happy and overly eneregtic during the assessment evidenced by laughing at inappropriate times, sticking his middle finger up to the close watch while laughing and laughing during assessment. Pt is agreeable to  with [facility] at his request this date. JOANA updated andreea.   ANJU ZHOU CSW  "

## 2023-12-04 NOTE — ED NOTES
"Patient was laughting and talkative upon my arrival, stating \"fun times\" he has had a [place]. States he is here because he was going to cut his throat. States he is upset because no one would get him a turkey sandwich. I got him some food and was then informed that patient is discharged and needed to leave, he stated he wasn't going to leave due to his desire to cut throat by using a knife that he hid in the bushes outside the hospital.  Rich demanded to speak to police.  We call [employer] and they arrived.  Please see their notes.  I explained to [employer] what happened and advised that patient's condition was reviewed by the hospital's  who is credentialed to review psychiatric patients and by Dr. Valdovinos,  and both were in agreement that rich could be discharged  "

## 2023-12-04 NOTE — SIGNIFICANT NOTE
12/04/23 1546   Plan   Final Note JOANA notified by Maria D that their provider denied to accept pt this date due to prior admissions. JOANA reached out to [facility] as pt mentioned their program or another program they recommend. [facility] is also not able to take pt this date but recommended [facility]. JOANA left message with Tone at [facility] for return call this date. JOANA updated provider.

## 2023-12-04 NOTE — ED NOTES
PT refused to leave ED at discharge. CN contacted Security who referred to [employer]. [employer] dispatch was contacted and an officer is inbound.

## 2023-12-04 NOTE — SIGNIFICANT NOTE
"   12/04/23 6980   Plan   Plan Comments SW met with patient to provide number to [facility] to contact. SW informed patient that [facility] was called and voicemail was left and for patient to follow up with them on possible admission to their facility. SW also informed that we would be able to give patient a one time cab voucher to [facility] with possibility of room in the inn. Pt reports that he is suicidal and that no one listens to him. He also reports that he doesn't want to go back to [facility] and that he would rather go to the homeless shelter. Pt states that he does have suicidal ideations and that he would like to go to a sober living but is actively using. He also reports that he would like to go to a place that has access to a PCP and psychiatric services as well. SW informed that [facility] will not take patient, [facility] will not take patient, He doesn't want to return to [facility] and doesn't want to go to [facility]. SW informed that options are limited at this point for substance use treatment. SW provided patient with PCP list and [facility], rehab list. Pt states that he had hid a knife out in the bushes outside of the Emergency Department. Pt has stated that he has attempted suicide in the past and that said, \"well no one is going to take me seriously when I say I want to kill myself.\" SW contacted Security for search. [employer] was present during this conversation with patient. SW informed provider of this information.       "

## 2023-12-04 NOTE — DISCHARGE INSTRUCTIONS
You have declined going back to [facility].  You have been declined by our psychiatric facility.  We have provided information for resources for shelters you can stay at.   Anesthesia Volume In Cc: 6

## 2023-12-04 NOTE — SIGNIFICANT NOTE
12/04/23 1732   Plan   Plan Comments SW met with patient while provider was present. Pt was informed that [facility] denied admission for patient and that his options are to go to a homeless shelter due to not wanting to return to [facility]. SW informed that day shelter involves patient getting to facility and putting name on list for possible bed at the night shelter. Pt became agitated due to SW discussing discharge options and saying that no one believed him or sending him out on the streets if he didn't get a bed there. SW informed that he would at least get to have a warm meal at the [facility]. Pt agreed to have cab come and get him to take him to [facility] and that he would be calling police to escort him elsewhere if he is unable to get a bed tonight. SW provide patient cab voucher from [employer].

## 2023-12-04 NOTE — ED PROVIDER NOTES
"Time: 1:12 PM EST  Date of encounter:  12/4/2023  Independent Historian/Clinical History and Information was obtained by:   Patient    History is limited by: N/A    Chief Complaint   Patient presents with    Suicidal     Patient states that he is here for suicidal ideation.  Patient states that he has been going through a lot and he wants to \"slit his own throat\".  Patient states that he is coming off meth and heroine.           History of Present Illness:  Patient is a 23 y.o. year old male who presents to the emergency department for evaluation of suicidal ideations. Started last week. Triggered by death of family member. Plan to \"slit his throat\". (Elizabeth Gatica PA-C provider in triage 1:13 PM EST )      Currently at [facility] but left. Does not want to go back to [facility]. There for meth abuse. Denies hallucinations.     Patient Care Team  Primary Care Provider: Provider, No Known    Past Medical History:     Allergies   Allergen Reactions    Capsicum Annuum Extract & Derivative (Bell Pepper) [Capsicum] Hives    Fish-Derived Products Anaphylaxis    Onion Anaphylaxis    Peanut-Containing Drug Products Hives    Tomato Itching     Past Medical History:   Diagnosis Date    Anxiety     Bipolar 1 disorder     Depression     PTSD (post-traumatic stress disorder)     Substance abuse      Past Surgical History:   Procedure Laterality Date    KNEE ACL RECONSTRUCTION Right     KNEE ACL RECONSTRUCTION Right 05/2016     Family History   Problem Relation Age of Onset    Depression Mother     Drug abuse Mother     Alcohol abuse Mother     Diabetes Father     Hypertension Father     Drug abuse Father     Alcohol abuse Father     Drug abuse Brother     Drug abuse Brother     Suicide Attempts Neg Hx        Home Medications:  Prior to Admission medications    Medication Sig Start Date End Date Taking? Authorizing Provider   FLUoxetine (PROzac) 20 MG capsule Take 1 capsule by mouth daily. 8/16/23   Carl Winston MD " "  ondansetron (ZOFRAN) 4 MG tablet Take 1 tablet by mouth Every 6 (Six) Hours As Needed for Nausea or Vomiting. 8/30/23   David Castillo PA   prazosin (MINIPRESS) 2 MG capsule Take 1 capsule by mouth every night. 8/15/23   Carl Winston MD   QUEtiapine (SEROquel) 200 MG tablet Take 1 tablet by mouth every night. 8/15/23   Carl Winston MD        Social History:   Social History     Tobacco Use    Smoking status: Every Day     Packs/day: .5     Types: Cigarettes    Smokeless tobacco: Current     Types: Snuff   Vaping Use    Vaping Use: Former   Substance Use Topics    Alcohol use: Yes     Comment: reports past week has drank \"4 small bottles\" of liquor daily.    Drug use: Yes     Types: Methamphetamines, Cocaine(coke), \"Crack\" cocaine, Heroin     Comment: reports used meth about three hours ago.         Review of Systems:  Review of Systems   Constitutional:  Negative for chills and fever.   HENT:  Negative for congestion and sore throat.    Respiratory:  Negative for cough and shortness of breath.    Cardiovascular:  Negative for chest pain and palpitations.   Gastrointestinal:  Negative for abdominal pain, diarrhea, nausea and vomiting.   Genitourinary:  Negative for dysuria.   Neurological:  Negative for headaches.   Psychiatric/Behavioral:  Positive for self-injury and suicidal ideas.    All other systems reviewed and are negative.       Physical Exam:  /78 (BP Location: Left arm, Patient Position: Sitting)   Pulse 96   Temp 98.3 °F (36.8 °C) (Oral)   Resp 18   Ht 185.4 cm (73\")   Wt 120 kg (264 lb 6.4 oz)   SpO2 99%   BMI 34.88 kg/m²         Physical Exam  Vitals and nursing note reviewed.   Constitutional:       Appearance: Normal appearance. He is not ill-appearing or toxic-appearing.   HENT:      Head: Normocephalic.      Nose: Nose normal.      Mouth/Throat:      Mouth: Mucous membranes are moist.   Eyes:      Extraocular Movements: Extraocular movements intact.      Conjunctiva/sclera: " Conjunctivae normal.   Cardiovascular:      Rate and Rhythm: Normal rate and regular rhythm.   Pulmonary:      Effort: Pulmonary effort is normal.      Breath sounds: Normal breath sounds.   Abdominal:      General: There is no distension.   Musculoskeletal:         General: Normal range of motion.      Cervical back: Normal range of motion.   Skin:     General: Skin is warm and dry.      Capillary Refill: Capillary refill takes less than 2 seconds.      Coloration: Skin is not cyanotic.      Comments: Superficial linear abrasions to right anterior wrist   Neurological:      Mental Status: He is alert and oriented to person, place, and time.   Psychiatric:         Attention and Perception: Attention and perception normal.         Mood and Affect: Mood normal.         Thought Content: Thought content includes suicidal ideation. Thought content includes suicidal plan.                      Procedures:  Procedures      Medical Decision Making:      Comorbidities that affect care:    Bipolar 1, PTSD, depression, Substance Abuse    External Notes reviewed:    Previous ED Note:  ED 11/28/23 for abdominal pain  and  ED 11/21/23 for passive suicidal ideations, given resources to start previously prescribed medications per note. Evaluated by psych at  11/15-11/16/23, repeat visits in past 18 months, frequent hospitalization at [facility]. Per note, hx of unstable housing, interpersonal conflict, moving between cities/shelters and sober living arrangements.       The following orders were placed and all results were independently analyzed by me:  Orders Placed This Encounter   Procedures    Deputy Draw    Comprehensive Metabolic Panel    Acetaminophen Level    Ethanol    Salicylate Level    Urine Drug Screen - Urine, Clean Catch    CBC Auto Differential    Continuous Pulse Oximetry    Vital Signs    Undress & Gown    POC Glucose Once    CBC & Differential    Green Top (Gel)    Lavender Top    Gold Top - SST  "   Light Blue Top       Medications Given in the Emergency Department:  Medications - No data to display     ED Course:    The patient was initially evaluated in the triage area where orders were placed. The patient was later dispositioned by Elizabeth Gatica PA-C.      The patient was advised to stay for completion of workup which includes but is not limited to communication of labs and radiological results, reassessment and plan. The patient was advised that leaving prior to disposition by a provider could result in critical findings that are not communicated to the patient.     ED Course as of 12/05/23 1957   Mon Dec 04, 2023   1551 Pt was declined by [employer] [facility]. Have left message to [employer]. Refused Commitment house. Does not want to go back to [facility] [KM]   1639 Pt now states that he is suicidal and that he hid a knife in the bushes outside and that \"no one is listening to him\" and he would leave and slit his throat [KM]   1714 Consult with Dr. Valdovinos, manipulative behaviour, declined admission. Recommendation for [place], [facility] or homeless shelter if wont go back to [facility].  [KM]      ED Course User Index  [KM] Elizabeth Gatica PA-C       Labs:    Lab Results (last 24 hours)       ** No results found for the last 24 hours. **             Imaging:    No Radiology Exams Resulted Within Past 24 Hours      Differential Diagnosis and Discussion:      Psychiatric: Differential diagnosis includes but is not limited to depression, psychosis, bipolar disorder, anxiety, manic episode, schizophrenia, and substance abuse.    All labs were reviewed and interpreted by me.  EKG was interpreted by supervising attending.    MDM               Patient Care Considerations:    PSYCH: I considered ordering anxiolytic and or antipsychotic medications, however patient was able to facilitate the medical screening exam and disposition without further medications.      Consultants/Shared Management " Plan:    Has been evaluated by JOANA Tripp. Has denied current HI/SI. States that he always feels suicidal. Has been declined by two places. See work-up secion  Consultant: I have discussed the [facility] who states does not feel meets admission criteria at this time.     Social Determinants of Health:    Patient is homeless. Nursing staff was instructed to give patient adequate resources to care access.   Social work has supplied cab ride to day shelter, as well as multiple resource options    Disposition and Care Coordination:    Discharged: I considered escalation of care by admitting this patient to the hospital, however the patient has improved and is suitable and stable for discharge.    I have explained the patient´s condition, diagnoses and treatment plan based on the information available to me at this time. I have answered questions and addressed any concerns. The patient has a good  understanding of the patient´s diagnosis, condition, and treatment plan as can be expected at this point. The vital signs have been stable. The patient´s condition is stable and appropriate for discharge from the emergency department.      The patient will pursue further outpatient evaluation with the primary care physician or other designated or consulting physician as outlined in the discharge instructions. They are agreeable to this plan of care and follow-up instructions have been explained in detail. The patient has received these instructions in written format and have expressed an understanding of the discharge instructions. The patient is aware that any significant change in condition or worsening of symptoms should prompt an immediate return to this or the closest emergency department or call to 911.  I have explained discharge medications and the need for follow up with the patient/caretakers. This was also printed in the discharge instructions. Patient was discharged with the following  medications and follow up:      Medication List      No changes were made to your prescriptions during this visit.      Provider, No Known  [facility] [id number]          [facility] EMERGENCY ROOM  [address] [address] [phone]           Final diagnoses:   Passive suicidal ideations        ED Disposition       ED Disposition   Discharge    Condition   Stable    Comment   --               This medical record created using voice recognition software.             Elizabeth Gatica PA-C  12/05/23 1957

## 2023-12-04 NOTE — ED NOTES
Pt states he is having suicidal thoughts, states his plan is to slice his neck with a knife. Pt states he has no family support, states he is currently [employer]

## 2023-12-05 PROBLEM — F12.10 CANNABIS ABUSE: Status: ACTIVE | Noted: 2023-12-05

## 2023-12-05 PROBLEM — J45.909 ASTHMA: Status: ACTIVE | Noted: 2023-12-05

## 2023-12-05 PROBLEM — F15.10 METHAMPHETAMINE ABUSE: Status: ACTIVE | Noted: 2023-12-05

## 2023-12-05 PROBLEM — Z59.00 HOMELESS: Status: ACTIVE | Noted: 2023-12-05

## 2023-12-05 RX ORDER — QUETIAPINE FUMARATE 100 MG/1
100 TABLET, FILM COATED ORAL NIGHTLY
COMMUNITY
Start: 2023-10-20 | End: 2023-12-07 | Stop reason: HOSPADM

## 2023-12-05 RX ORDER — CYCLOBENZAPRINE HCL 5 MG
5 TABLET ORAL 3 TIMES DAILY PRN
COMMUNITY
Start: 2023-12-04 | End: 2023-12-07 | Stop reason: HOSPADM

## 2023-12-05 RX ORDER — TRAZODONE HYDROCHLORIDE 50 MG/1
50 TABLET ORAL NIGHTLY
Status: ON HOLD | COMMUNITY
Start: 2023-12-04 | End: 2023-12-07 | Stop reason: SDUPTHER

## 2023-12-05 RX ORDER — FLUOXETINE HYDROCHLORIDE 20 MG/1
20 CAPSULE ORAL DAILY
Status: DISCONTINUED | OUTPATIENT
Start: 2023-12-05 | End: 2023-12-07 | Stop reason: HOSPADM

## 2023-12-05 RX ORDER — ALBUTEROL SULFATE 90 UG/1
2 AEROSOL, METERED RESPIRATORY (INHALATION) EVERY 6 HOURS PRN
COMMUNITY
Start: 2023-11-01

## 2023-12-05 RX ORDER — QUETIAPINE FUMARATE 200 MG/1
200 TABLET, FILM COATED ORAL NIGHTLY
Status: DISCONTINUED | OUTPATIENT
Start: 2023-12-05 | End: 2023-12-06

## 2023-12-05 RX ORDER — PRAZOSIN HYDROCHLORIDE 1 MG/1
2 CAPSULE ORAL NIGHTLY
Status: DISCONTINUED | OUTPATIENT
Start: 2023-12-05 | End: 2023-12-07 | Stop reason: HOSPADM

## 2023-12-05 RX ORDER — ATOMOXETINE 40 MG/1
40 CAPSULE ORAL DAILY
COMMUNITY
Start: 2023-11-16 | End: 2023-12-07 | Stop reason: HOSPADM

## 2023-12-05 RX ORDER — HYDROXYZINE PAMOATE 25 MG/1
25 CAPSULE ORAL 3 TIMES DAILY PRN
Status: ON HOLD | COMMUNITY
Start: 2023-12-04 | End: 2023-12-07 | Stop reason: SDUPTHER

## 2023-12-05 RX ADMIN — FLUOXETINE 20 MG: 20 CAPSULE ORAL at 13:18

## 2023-12-05 RX ADMIN — IBUPROFEN 400 MG: 400 TABLET, FILM COATED ORAL at 09:48

## 2023-12-05 RX ADMIN — IBUPROFEN 400 MG: 400 TABLET, FILM COATED ORAL at 16:42

## 2023-12-05 RX ADMIN — PRAZOSIN HYDROCHLORIDE 2 MG: 1 CAPSULE ORAL at 20:56

## 2023-12-05 NOTE — H&P
" [facility]   PSYCHIATRIC  HISTORY AND PHYSICAL    Patient Name: Linwood Patel  : 2000  MRN: [id number]  Primary Care Physician:  Provider, No Known  Date of admission: 2023    Subjective   Subjective     Chief Complaint: \"Suicidal ideation, want to cut my throat open.\"    HPI:     Linwood Patel is a 23 y.o. male admitted on [facility].  The patient presented to the hospital reporting suicidal ideations.  Patient been kicked out of [facility] came to the hospital emergency room was evaluated.  He initially denies suicidal ideation but when it came to discharge him he began expressing suicidal ideation.  There was some suspicion that being homeless and not allowed to go back to [facility] was the reason for his suicidal thoughts.  Patient stated he had a knife hidden in the bushes outside the emergency room would slit his throat upon leaving the hospital.    Patient reports he has had suicidal ideations over the last 2 days.  He reports he has suicidal ideations today but states he no longer has a plan.  Patient states that he has had help in the past and thought he can get help and came here to the hospital to do a thing to harm himself.  He reports that he is \"stressed\" and reports it is over lots of different things but is very reticent and refuses to talk about his current stressors.  States most of his family stuff.    Patient reports a long history of polysubstance use including methamphetamine, fentanyl, heroin, and alcohol.  Reports he used methamphetamine 2 days ago.  Began using drugs at age 14.  His long sobriety was 7 months in early .    Reports has been off his medications for about 3 weeks and needs to get stable.  He cannot tell me what his previous medications were.    Patient reports he is depressed.  Reports feeling very low.  Reports poor sleep and poor energy.    Patient is going to be discharged emergency room yesterday and refused to leave.  Patient continued to voice suicidal " ideations.  Notes indicated the patient was euthymic pleasant and engaging while in emergency room until it appeared that he was going to be discharged.  Calls were made for different sober living houses and he was either refused, or would not go.      Review of Systems:      CONSTITUTIONAL: Feels well denies any acute medical problems  PSYCHIATRIC: As documented in HPI    Personal History     Past Medical History:   Diagnosis Date    Anxiety     Bipolar 1 disorder     Depression     PTSD (post-traumatic stress disorder)     Substance abuse        Past Surgical History:   Procedure Laterality Date    KNEE ACL RECONSTRUCTION Right     KNEE ACL RECONSTRUCTION Right 05/2016       Past Psychiatric History: Long history of treatment with multiple diagnoses including bipolar disorder, ADD, PTSD, schizophrenia, anxiety, and personality disorders    Psychiatric Hospitalizations: Multiple hospitalizations.  Patient states last hospitalization was at [facility], but does not recall when he was there.  Review of notes available show hospitalization for similar circumstances at [facility] on November 15 of this year    Suicide Attempts: History of attempts    Prior Treatment and Medications Tried: Multiple medication trials      Family History: family history includes Alcohol abuse in his father and mother; Depression in his mother; Diabetes in his father; Drug abuse in his brother, brother, father, and mother; Hypertension in his father. Otherwise pertinent FHx was reviewed and not pertinent to current issue.    Family Suicide History:None known to patient      Social History:     Born and raised in [place] and [place].  States he has been in [place] for a very short amount of time and came here on his own because he thought would be a good place to travel to.  He is a high school graduate.  He has never  and states he has 1 child on the way.  He is currently homeless.  He is unemployed.    Has never  "been in the     Is not Cheondoism or spiritual    Reports a long history of abuse    Social History     Socioeconomic History    Marital status: Single    Number of children: 1    Years of education: 12th grade    Highest education level: 12th grade   Tobacco Use    Smoking status: Every Day     Packs/day: .5     Types: Cigarettes    Smokeless tobacco: Current     Types: Snuff   Vaping Use    Vaping Use: Former   Substance and Sexual Activity    Alcohol use: Yes     Comment: reports past week has drank \"4 small bottles\" of liquor daily.    Drug use: Yes     Types: Methamphetamines, Cocaine(coke), \"Crack\" cocaine, Heroin     Comment: reports used meth about three hours ago.    Sexual activity: Yes     Partners: Female       Substance Abuse History: reports that he has been smoking cigarettes. He has been smoking an average of .5 packs per day. His smokeless tobacco use includes snuff. He reports current alcohol use. He reports current drug use. Drugs: Methamphetamines, Cocaine(coke), \"Crack\" cocaine, and Heroin.    Home Medications:   FLUoxetine, QUEtiapine, ondansetron, and prazosin      Allergies:  Allergies   Allergen Reactions    Capsicum Annuum Extract & Derivative (Bell Pepper) [Capsicum] Hives    Fish-Derived Products Anaphylaxis    Onion Anaphylaxis    Peanut-Containing Drug Products Hives    Tomato Itching       Objective   Objective     Vitals:   Temp:  [97.7 °F (36.5 °C)-98.3 °F (36.8 °C)] 97.8 °F (36.6 °C)  Heart Rate:  [] 86  Resp:  [16-18] 18  BP: (113-133)/(74-93) 113/93    Physical Exam:      CONSTITUTIONAL: Patient is well developed, well nourished, awake and alert.  HEENT: Head and neck are normocephalic and atraumatic.   LUNGS: Even unlabored respirations.  SKIN: Clean, dry, intact.  EXTREMITIES: No clubbing, cyanosis, edema.  MUSCULOSKELETAL: Symmetric body habitus. Spine straight. Strength intact,  NEUROLOGIC: Appropriate. No abnormal movements, good muscle tone.                      " "        Cerebellar: station and gait steady.    Mental Status Exam:     Awake, alert, oriented male appears appropriate for stated age.  He participates fully in exam.  There is no psychomotor restlessness or agitation.  From interviewing given history is of average intelligence, as a limited but overall reliable historian       Hygiene:   good  Cooperation:  Cooperative  Eye Contact:  Good  Psychomotor Behavior:  Appropriate  Affect:  Restricted  Mood: \"Depressed, no energy\"  Speech:  Normal  Language: Appropriate  Thought Process:  Goal directed and Linear  Thought Content:  Normal  Suicidal:  Suicidal Ideation  Homicidal:  None  Hallucinations:  None  Delusion:  None  Memory:  Intact  Orientation:  Person, Place, Time, and Situation  Reliability:  fair  Insight:  Poor  Judgement:  Impaired  Impulse Control:  Impaired        Result Review    Result Review:  I have personally reviewed the results from the time of this admission to 12/5/2023 11:59 EST and agree with these findings:  [x]  Laboratory  []  Microbiology  []  Radiology  []  EKG/Telemetry   []  Cardiology/Vascular   []  Pathology  []  Old records  []  Other:  Most notable findings include: Toxicology positive for amphetamine    Assessment & Plan   Assessment / Plan     Brief Patient Summary:  Linwood Patel is a 23 y.o. male who admitted on [facility] for suicidal ideation    Active Hospital Problems:  Active Hospital Problems    Diagnosis     **Psychosis, unspecified psychosis type     Methamphetamine abuse     Post traumatic stress disorder (PTSD)     Major depressive disorder, recurrent episode, moderate        Plan:   Restart home medications, reports that he has been off of them for some time and thought they were effective.  These include prazosin, Prozac, and Seroquel  He did make referral to drug and alcohol rehabilitation  Admit for safety and stabilization and begin treatment for underlying mood disorder or psychosis with appropriate medications  Attempt " to gain collateral information of possible  Work on safety plan  Provide supportive therapy  Patient to engage in all group and individual treatment modalities available including milieu therapy  Work on appropriate disposition follow-up  Estimated length of stay in hospital 4 to 5 days      DVT prophylaxis:  Mechanical DVT prophylaxis orders are present.    CODE STATUS:    Code Status (Patient has no pulse and is not breathing): CPR (Attempt to Resuscitate)  Medical Interventions (Patient has pulse or is breathing): Full Support      Admission Status:  I believe this patient meets inpatient  status.      Part of this note may be an electronic transcription/translation of spoken language to printed text using the Dragon dictation system.        Electronically signed by Roger Valdovinos MD, 12/05/23, 11:51 AM EST.

## 2023-12-05 NOTE — PLAN OF CARE
Goal Outcome Evaluation:  Plan of Care Reviewed With: patient  Patient Agreement with Plan of Care: agrees     Progress: improving     Patient has been awake alert, clear speech, pt is irritable at times, able to make needs known, pt denies SI/HI and denies A/V/H, patient rates Anxiety 9/10 and depression 5/10. Pt contracts for safety, pt is medication compliant, no behavior issues during shift, no s/s of distress.

## 2023-12-05 NOTE — H&P (VIEW-ONLY)
" [facility]   PSYCHIATRIC  HISTORY AND PHYSICAL    Patient Name: Linwood Patel  : 2000  MRN: [id number]  Primary Care Physician:  Provider, No Known  Date of admission: 2023    Subjective   Subjective     Chief Complaint: \"Suicidal ideation, want to cut my throat open.\"    HPI:     Linwood Patel is a 23 y.o. male admitted on MIW.  The patient presented to the hospital reporting suicidal ideations.  [facility] came to the [facility] emergency room was evaluated.  He initially denies suicidal ideation but when it came to discharge him he began expressing suicidal ideation.  There was some suspicion that being homeless and not allowed to go back to [facility] was the reason for his suicidal thoughts.  Patient stated he had a knife hidden in the bushes outside the emergency room would slit his throat upon leaving the hospital.    Patient reports he has had suicidal ideations over the last 2 days.  He reports he has suicidal ideations today but states he no longer has a plan.  Patient states that he has had help in the past and thought he can get help and came here to the hospital to do a thing to harm himself.  He reports that he is \"stressed\" and reports it is over lots of different things but is very reticent and refuses to talk about his current stressors.  States most of his family stuff.    Patient reports a long history of polysubstance use including methamphetamine, fentanyl, heroin, and alcohol.  Reports he used methamphetamine 2 days ago.  Began using drugs at age 14.  His long sobriety was 7 months in early .    Reports has been off his medications for about 3 weeks and needs to get stable.  He cannot tell me what his previous medications were.    Patient reports he is depressed.  Reports feeling very low.  Reports poor sleep and poor energy.    Patient is going to be discharged emergency room yesterday and refused to leave.  Patient continued to voice suicidal " ideations.  Notes indicated the patient was euthymic pleasant and engaging while in emergency room until it appeared that he was going to be discharged.  Calls were made for different sober living houses and he was either refused, or would not go.      Review of Systems:      CONSTITUTIONAL: Feels well denies any acute medical problems  PSYCHIATRIC: As documented in HPI    Personal History     Past Medical History:   Diagnosis Date    Anxiety     Bipolar 1 disorder     Depression     PTSD (post-traumatic stress disorder)     Substance abuse        Past Surgical History:   Procedure Laterality Date    KNEE ACL RECONSTRUCTION Right     KNEE ACL RECONSTRUCTION Right 05/2016       Past Psychiatric History: Long history of treatment with multiple diagnoses including bipolar disorder, ADD, PTSD, schizophrenia, anxiety, and personality disorders    Psychiatric Hospitalizations: Multiple hospitalizations.  Patient states last hospitalization was at [facility], but does not recall when he was there.  Review of notes available show hospitalization for similar circumstances at [facility] on November 15 of this year    Suicide Attempts: History of attempts    Prior Treatment and Medications Tried: Multiple medication trials      Family History: family history includes Alcohol abuse in his father and mother; Depression in his mother; Diabetes in his father; Drug abuse in his brother, brother, father, and mother; Hypertension in his father. Otherwise pertinent FHx was reviewed and not pertinent to current issue.    Family Suicide History:None known to patient      Social History:     Born and raised in [place] and [place].  States he has been in [place] for a very short amount of time and came here on his own because he thought would be a good place to travel to.  He is a high school graduate.  He has never  and states he has 1 child on the way.  He is currently homeless.  He is unemployed.    Has never  "been in the     Is not Anabaptism or spiritual    Reports a long history of abuse    Social History     Socioeconomic History    Marital status: Single    Number of children: 1    Years of education: 12th grade    Highest education level: 12th grade   Tobacco Use    Smoking status: Every Day     Packs/day: .5     Types: Cigarettes    Smokeless tobacco: Current     Types: Snuff   Vaping Use    Vaping Use: Former   Substance and Sexual Activity    Alcohol use: Yes     Comment: reports past week has drank \"4 small bottles\" of liquor daily.    Drug use: Yes     Types: Methamphetamines, Cocaine(coke), \"Crack\" cocaine, Heroin     Comment: reports used meth about three hours ago.    Sexual activity: Yes     Partners: Female       Substance Abuse History: reports that he has been smoking cigarettes. He has been smoking an average of .5 packs per day. His smokeless tobacco use includes snuff. He reports current alcohol use. He reports current drug use. Drugs: Methamphetamines, Cocaine(coke), \"Crack\" cocaine, and Heroin.    Home Medications:   FLUoxetine, QUEtiapine, ondansetron, and prazosin      Allergies:  Allergies   Allergen Reactions    Capsicum Annuum Extract & Derivative (Bell Pepper) [Capsicum] Hives    Fish-Derived Products Anaphylaxis    Onion Anaphylaxis    Peanut-Containing Drug Products Hives    Tomato Itching       Objective   Objective     Vitals:   Temp:  [97.7 °F (36.5 °C)-98.3 °F (36.8 °C)] 97.8 °F (36.6 °C)  Heart Rate:  [] 86  Resp:  [16-18] 18  BP: (113-133)/(74-93) 113/93    Physical Exam:      CONSTITUTIONAL: Patient is well developed, well nourished, awake and alert.  HEENT: Head and neck are normocephalic and atraumatic.   LUNGS: Even unlabored respirations.  SKIN: Clean, dry, intact.  EXTREMITIES: No clubbing, cyanosis, edema.  MUSCULOSKELETAL: Symmetric body habitus. Spine straight. Strength intact,  NEUROLOGIC: Appropriate. No abnormal movements, good muscle tone.                      " "        Cerebellar: station and gait steady.    Mental Status Exam:     Awake, alert, oriented male appears appropriate for stated age.  He participates fully in exam.  There is no psychomotor restlessness or agitation.  From interviewing given history is of average intelligence, as a limited but overall reliable historian       Hygiene:   good  Cooperation:  Cooperative  Eye Contact:  Good  Psychomotor Behavior:  Appropriate  Affect:  Restricted  Mood: \"Depressed, no energy\"  Speech:  Normal  Language: Appropriate  Thought Process:  Goal directed and Linear  Thought Content:  Normal  Suicidal:  Suicidal Ideation  Homicidal:  None  Hallucinations:  None  Delusion:  None  Memory:  Intact  Orientation:  Person, Place, Time, and Situation  Reliability:  fair  Insight:  Poor  Judgement:  Impaired  Impulse Control:  Impaired        Result Review    Result Review:  I have personally reviewed the results from the time of this admission to 12/5/2023 11:59 EST and agree with these findings:  [x]  Laboratory  []  Microbiology  []  Radiology  []  EKG/Telemetry   []  Cardiology/Vascular   []  Pathology  []  Old records  []  Other:  Most notable findings include: Toxicology positive for amphetamine    Assessment & Plan   Assessment / Plan     Brief Patient Summary:  Linwood Patel is a 23 y.o. male who admitted on [facility] for suicidal ideation    Active Hospital Problems:  Active Hospital Problems    Diagnosis     **Psychosis, unspecified psychosis type     Methamphetamine abuse     Post traumatic stress disorder (PTSD)     Major depressive disorder, recurrent episode, moderate        Plan:   Restart home medications, reports that he has been off of them for some time and thought they were effective.  These include prazosin, Prozac, and Seroquel  He did make referral to drug and alcohol rehabilitation  Admit for safety and stabilization and begin treatment for underlying mood disorder or psychosis with appropriate medications  Attempt " to gain collateral information of possible  Work on safety plan  Provide supportive therapy  Patient to engage in all group and individual treatment modalities available including milieu therapy  Work on appropriate disposition follow-up  Estimated length of stay in hospital 4 to 5 days      DVT prophylaxis:  Mechanical DVT prophylaxis orders are present.    CODE STATUS:    Code Status (Patient has no pulse and is not breathing): CPR (Attempt to Resuscitate)  Medical Interventions (Patient has pulse or is breathing): Full Support      Admission Status:  I believe this patient meets inpatient  status.      Part of this note may be an electronic transcription/translation of spoken language to printed text using the Dragon dictation system.        Electronically signed by Roger Valdovinos MD, 12/05/23, 11:51 AM EST.

## 2023-12-05 NOTE — NURSING NOTE
"PT ADMITTED ON AN INVOLUNTARY M.I.W.WITH DIAGNOSIS OF PSYCHOSIS UNSPECIFIED. PT WAS SEEN IN E.D. TODAY AND WAS DISCHARGED. PT REFUSED TO LEAVE REPORTING SUICIDAL IDEATIONS WITH PLAN TO CUT HIS THROAT AND REPORTED HE HID A KNIFE HID OUTSIDE IN THE BUSHES.POLICE WAS CALLED AND PT WAS TAKEN FOR EVAL FOR A M.I.W.    PT REPORTS HE IS HOMELESS.REPORTS HE HAS A HISTORY OF SCHIZOPHRENIA,BIPOLAR D/O AND A.D.H.D.STATES HE HAS BEEN OFF PSYCH  MEDICATIONS FOR A MONTH.PT REPORTS HE IS HAVING SUICIDAL IDEATIONS AND HAS A PLAN OF CUTTING HIS THROAT.STATES HE'S HERE FOR HELP AND WANTS HELP.REPORTS PAST SUICIDAL IDEATIONS WITH SUICIDE ATTEMPT TWO WEEKS AGO ,REPORTS HE HUNG HIMSELF AND WAS CUT DOWN BY HIS SISTER.STATES HIS GRANDMOTHER PASSED AWAY AT THAT TIME AND SHE WAS HIS ONLY SUPPORT SYSTEM HE HAD LEFT.REPORTS HE CUT HIS ARMS WITH A PIECE OF GLASS FRIDAY 12/1/23 TO RELIEVE STRESS,DENIES THIS BEING A SUICIDE ATTEMPT.NOTED HEALING SUPERFICIAL WOUNDS BILATERAL LOWER INNER ARMS.PT DENIES HOMICIDAL IDEATIONS.PT REPORTS HEARING VOICES BUT WAS NOT ABLE TO DESCRIBE.  PT REPORTS HISTORY OF ETOH AND DRUG USE.REPORTS HE HAD BEEN DRINKING ETOH OCCASIONALLY UP UNTIL THIS PAST WEEK. STATES THIS PAST WEEK  HE HAS BEEN DRINKING DAILY ABOUT \"FOUR SMALL BOTTLES\" OF LIQUOR. REPORTS HIS LAST DRINK WAS 2 DAYS AGO.REPORTED HE HAS A HISTORY OF ETOH WITHDRAWAL SYMPTOMS. REPORTS USE OF METHAMPHETAMINES LAST USE 2 DAYS AGO. TOX SCREEN POSITIVE FOR METHAMPHETAMINES.REPORTS HE HAS BEEN TO MULTIPLE SUBSTANCE ABUSE TREATMENT CENTERS, LAST ADMIT WAS 2 DAYS AGO AT [facility]. STATES HE WAS THERE FOR SIX HOURS AND WAS KICKED OUT FOR DRINKING ETOH.  PT PLACED ON CLOSE WATCH AND CIWA MONITORING.TREATMENT PLAN INITIATED ,WILL MONITOR.    "

## 2023-12-06 RX ORDER — QUETIAPINE FUMARATE 25 MG/1
50 TABLET, FILM COATED ORAL NIGHTLY
Status: DISCONTINUED | OUTPATIENT
Start: 2023-12-06 | End: 2023-12-07 | Stop reason: HOSPADM

## 2023-12-06 RX ADMIN — PRAZOSIN HYDROCHLORIDE 2 MG: 1 CAPSULE ORAL at 20:23

## 2023-12-06 RX ADMIN — IBUPROFEN 400 MG: 400 TABLET, FILM COATED ORAL at 20:45

## 2023-12-06 RX ADMIN — QUETIAPINE FUMARATE 50 MG: 25 TABLET ORAL at 20:23

## 2023-12-06 RX ADMIN — FLUOXETINE 20 MG: 20 CAPSULE ORAL at 08:43

## 2023-12-06 NOTE — PLAN OF CARE
Goal Outcome Evaluation:  Plan of Care Reviewed With: patient  Patient Agreement with Plan of Care: agrees     Progress: no change Pt has been up in the dayroom watching t.v.Reports feeling depressed and rated depression a 8/10. Denies feeling anxious.Pt denies suicidal and homicidal ideations and denies hallucinations.Pt noted to be irritable and argumentative at times.Pt refused night time dose of Seroquel.Treatment plan reviewed and is ongoing.

## 2023-12-06 NOTE — PLAN OF CARE
"Goal Outcome Evaluation:  Plan of Care Reviewed With: patient  Patient Agreement with Plan of Care: agrees           NURSING NOTE:  PT NOTED TO BE RESTING ABED THIS MORNING.  STATED HIS MOOD IS \"O-CRYS\".  PT WAS PLEASANT ON APPROACH.  RATES HIS DEPRESSION A 10/10 AND ANXIETY AN 8/10.  PT REPORTS HE CONTINUES TO HAVE SUICIDAL IDEATIONS; HOWEVER, HE WOULD NOT ACT ON THEM AND IS ABLE TO VERBALLY CONTRACT FOR SAFETY.  PT DENIES HI OR HALLUCINATIONS.  PT ASKED ABOUT DAY SHELTER HERE IN [place] AS HE IS CURRENTLY HOMELESS.  ALSO STATED HE WOULD LIKE TO GO TO [place], [place] SO HE COULD BE CLOSE TO THE [facility] OR PERHAPS TO [place].  PT WAS COOPERATIVE WITH AM MEDICATION ADMINISTRATION OF PROZAC AND IS VERBALIZING NO COMPLAINTS OF SIDE EFFECTS.  ROOM IS UNBLOCKED PER PHYSICIAN ORDER.   WILL CONTINUE TO MONITOR MOOD AND BEHAVIOR, PROVIDE A SAFE ENVIRONMENT.  TREATMENT PLAN IS PROGRESSING AND ONGOING.         "

## 2023-12-06 NOTE — CASE MANAGEMENT/SOCIAL WORK
Pt signed voluntary form 10:29 am. Discussed local shelter with pt this am, pt aware anticipate dc tomorrow.

## 2023-12-06 NOTE — PROGRESS NOTES
" [facility]     Psychiatric Progress Note    Patient Name: Linwood Patel  : 2000  MRN: [id number]  Primary Care Physician:  Provider, No Known  Date of admission: 2023    Subjective   Subjective     Patient seen and chart reviewed, discussed with staff.    Chief Complaint: Depression, substance use      HPI:     Staff reports the patient has been a little irritable and reported some suicidal ideations yesterday.  Rated his depression as an 8.  He is noted to slept well last night.  He is denying suicidal ideations to staff today.    Patient today is lying calmly in his bed.  He arouses and participates fully in interview.  He reports he slept well.  States that overall he is doing well today.  He denies suicidal ideation and states getting good night sleep has helped.    Patient states when he leaves here he wants to go to the homeless shelter because he said they can help work on bus tickets and wants to get back to [facility].  He is calm and cooperative.    States his mood is good.  He is complaining of nightmares and has a history of abuse.  States he has breakthrough nightmares even with the prazosin.  Patient also states that the Seroquel dose should only be 50 mg and feels groggy on as much as 200.      Objective   Objective     Vitals:   Temp:  [98.1 °F (36.7 °C)-98.6 °F (37 °C)] 98.6 °F (37 °C)  Heart Rate:  [72-77] 77  Resp:  [16-18] 18  BP: (107)/(57-75) 107/57          Mental Status Exam:      Appearance:   Well-developed, well-nourished, calm, cooperative  Reliability:   Good  Eye Contact:   Good  Concentration/Focus:    Attentive to the interview  Behaviors:    No restlessness or agitation  Memory :    Sensorium intact  Speech:    Normal rate and volume  Language:   Appropriate, relevant  Mood :    \"It is good\"  Affect:    Euthymic  Thought process:    Sequential, goal directed  Thought Content:    Denies suicidal or homicidal ideation, no hallucinations  Insight:   " Limited  Judgement:    Intact, no behavioral disturbance      Result Review    Result Review:  I have personally reviewed the results from the time of this admission to 12/6/2023 11:48 EST and agree with these findings:  []  Laboratory  []  Microbiology  []  Radiology  []  EKG/Telemetry   []  Cardiology/Vascular   []  Pathology  []  Old records  []  Other:  Most notable findings include:     Lab Results (last 24 hours)       ** No results found for the last 24 hours. **                Medications:   FLUoxetine, 20 mg, Oral, Daily  prazosin, 2 mg, Oral, Nightly  QUEtiapine, 200 mg, Oral, Nightly          Assessment / Plan       Active Hospital Problems:  Active Hospital Problems    Diagnosis     **Psychosis, unspecified psychosis type     Methamphetamine abuse     Post traumatic stress disorder (PTSD)     Major depressive disorder, recurrent episode, moderate        Plan:     Decrease quetiapine to 50 mg  Continue other meds as ordered and titrate as clinically indicated  Patient is willing to be here in the hospital having signed in voluntarily and drop MIW  Work on mood stabilization and abatement of any suicidal ideation or psychosis.  Work on appropriate safety plan  Continue supportive therapy  Patient to engage in all group and individual treatment modalities available on the unit  Obtain collateral information if possible  Titrate medications as clinically indicated  Work on appropriate disposition follow-up including referrals to substance abuse treatment if indicated      Disposition:  I expect patient to be discharged 2 to 3 days.    Part of this note may be an electronic transcription/translation of spoken language to printed text using the Dragon dictation system.         Electronically signed by Roger Valdovinos MD, 12/06/23, 11:48 AM EST.

## 2023-12-07 ENCOUNTER — HOSPITAL ENCOUNTER (EMERGENCY)
Facility: HOSPITAL | Age: 23
Discharge: PSYCHIATRIC OR SUBSTANCE USE FACILITY | DRG: 885 | End: 2023-12-08
Attending: EMERGENCY MEDICINE
Payer: MEDICAID

## 2023-12-07 VITALS
BODY MASS INDEX: 33.68 KG/M2 | HEART RATE: 92 BPM | OXYGEN SATURATION: 99 % | HEIGHT: 74 IN | TEMPERATURE: 98.3 F | SYSTOLIC BLOOD PRESSURE: 140 MMHG | RESPIRATION RATE: 18 BRPM | DIASTOLIC BLOOD PRESSURE: 89 MMHG

## 2023-12-07 VITALS
HEART RATE: 76 BPM | OXYGEN SATURATION: 96 % | RESPIRATION RATE: 20 BRPM | DIASTOLIC BLOOD PRESSURE: 73 MMHG | WEIGHT: 262.35 LBS | TEMPERATURE: 97.3 F | SYSTOLIC BLOOD PRESSURE: 131 MMHG | BODY MASS INDEX: 33.67 KG/M2 | HEIGHT: 74 IN

## 2023-12-07 DIAGNOSIS — R45.851 SUICIDAL IDEATION: Primary | ICD-10-CM

## 2023-12-07 DIAGNOSIS — F19.10 SUBSTANCE ABUSE: ICD-10-CM

## 2023-12-07 PROBLEM — F29 PSYCHOSIS, UNSPECIFIED PSYCHOSIS TYPE: Status: RESOLVED | Noted: 2023-12-04 | Resolved: 2023-12-07

## 2023-12-07 LAB
BASOPHILS # BLD AUTO: 0.02 10*3/MM3 (ref 0–0.2)
BASOPHILS NFR BLD AUTO: 0.2 % (ref 0–1.5)
DEPRECATED RDW RBC AUTO: 35.8 FL (ref 37–54)
EOSINOPHIL # BLD AUTO: 0.18 10*3/MM3 (ref 0–0.4)
EOSINOPHIL NFR BLD AUTO: 1.6 % (ref 0.3–6.2)
ERYTHROCYTE [DISTWIDTH] IN BLOOD BY AUTOMATED COUNT: 12.6 % (ref 12.3–15.4)
HCT VFR BLD AUTO: 48.1 % (ref 37.5–51)
HGB BLD-MCNC: 16.2 G/DL (ref 13–17.7)
IMM GRANULOCYTES # BLD AUTO: 0.03 10*3/MM3 (ref 0–0.05)
IMM GRANULOCYTES NFR BLD AUTO: 0.3 % (ref 0–0.5)
LYMPHOCYTES # BLD AUTO: 3.54 10*3/MM3 (ref 0.7–3.1)
LYMPHOCYTES NFR BLD AUTO: 32.3 % (ref 19.6–45.3)
MCH RBC QN AUTO: 27 PG (ref 26.6–33)
MCHC RBC AUTO-ENTMCNC: 33.7 G/DL (ref 31.5–35.7)
MCV RBC AUTO: 80.2 FL (ref 79–97)
MONOCYTES # BLD AUTO: 0.6 10*3/MM3 (ref 0.1–0.9)
MONOCYTES NFR BLD AUTO: 5.5 % (ref 5–12)
NEUTROPHILS NFR BLD AUTO: 6.6 10*3/MM3 (ref 1.7–7)
NEUTROPHILS NFR BLD AUTO: 60.1 % (ref 42.7–76)
NRBC BLD AUTO-RTO: 0 /100 WBC (ref 0–0.2)
PLATELET # BLD AUTO: 332 10*3/MM3 (ref 140–450)
PMV BLD AUTO: 9.3 FL (ref 6–12)
RBC # BLD AUTO: 6 10*6/MM3 (ref 4.14–5.8)
WBC NRBC COR # BLD AUTO: 10.97 10*3/MM3 (ref 3.4–10.8)

## 2023-12-07 PROCEDURE — 82077 ASSAY SPEC XCP UR&BREATH IA: CPT | Performed by: EMERGENCY MEDICINE

## 2023-12-07 PROCEDURE — 93005 ELECTROCARDIOGRAM TRACING: CPT

## 2023-12-07 PROCEDURE — 84443 ASSAY THYROID STIM HORMONE: CPT | Performed by: EMERGENCY MEDICINE

## 2023-12-07 PROCEDURE — 80143 DRUG ASSAY ACETAMINOPHEN: CPT | Performed by: EMERGENCY MEDICINE

## 2023-12-07 PROCEDURE — 84439 ASSAY OF FREE THYROXINE: CPT | Performed by: EMERGENCY MEDICINE

## 2023-12-07 PROCEDURE — 99285 EMERGENCY DEPT VISIT HI MDM: CPT

## 2023-12-07 PROCEDURE — 80179 DRUG ASSAY SALICYLATE: CPT | Performed by: EMERGENCY MEDICINE

## 2023-12-07 PROCEDURE — 93005 ELECTROCARDIOGRAM TRACING: CPT | Performed by: EMERGENCY MEDICINE

## 2023-12-07 PROCEDURE — 85025 COMPLETE CBC W/AUTO DIFF WBC: CPT

## 2023-12-07 PROCEDURE — 80053 COMPREHEN METABOLIC PANEL: CPT | Performed by: EMERGENCY MEDICINE

## 2023-12-07 RX ORDER — QUETIAPINE FUMARATE 50 MG/1
50 TABLET, FILM COATED ORAL NIGHTLY
Qty: 30 TABLET | Refills: 0 | Status: SHIPPED | OUTPATIENT
Start: 2023-12-07 | End: 2023-12-11 | Stop reason: HOSPADM

## 2023-12-07 RX ORDER — SODIUM CHLORIDE 0.9 % (FLUSH) 0.9 %
10 SYRINGE (ML) INJECTION AS NEEDED
Status: DISCONTINUED | OUTPATIENT
Start: 2023-12-07 | End: 2023-12-08 | Stop reason: HOSPADM

## 2023-12-07 RX ORDER — TRAZODONE HYDROCHLORIDE 50 MG/1
50 TABLET ORAL NIGHTLY
Qty: 30 TABLET | Refills: 0 | Status: SHIPPED | OUTPATIENT
Start: 2023-12-07 | End: 2023-12-11 | Stop reason: HOSPADM

## 2023-12-07 RX ORDER — FLUOXETINE HYDROCHLORIDE 20 MG/1
20 CAPSULE ORAL DAILY
Qty: 30 CAPSULE | Refills: 0 | Status: ON HOLD | OUTPATIENT
Start: 2023-12-07 | End: 2023-12-11 | Stop reason: SDUPTHER

## 2023-12-07 RX ORDER — HYDROXYZINE PAMOATE 25 MG/1
25 CAPSULE ORAL 3 TIMES DAILY PRN
Qty: 30 CAPSULE | Refills: 0 | Status: SHIPPED | OUTPATIENT
Start: 2023-12-07

## 2023-12-07 RX ORDER — PRAZOSIN HYDROCHLORIDE 2 MG/1
2 CAPSULE ORAL NIGHTLY
Qty: 30 CAPSULE | Refills: 0 | Status: ON HOLD | OUTPATIENT
Start: 2023-12-07 | End: 2023-12-11 | Stop reason: SDUPTHER

## 2023-12-07 RX ADMIN — FLUOXETINE 20 MG: 20 CAPSULE ORAL at 08:43

## 2023-12-07 NOTE — PLAN OF CARE
Goal Outcome Evaluation:  Plan of Care Reviewed With: patient  Patient Agreement with Plan of Care: agrees   PATIENT HAS REACHED ALL GOALS AND WILL BE DISCHARGED FROM UNIT. PATIENT REPORTS HE WILL BE TRAVELING TO [place].

## 2023-12-07 NOTE — SIGNIFICANT NOTE
12/07/23 1303   Plan   Plan Patient requesting to discharge today, pt instructed on process to obtain temporary shelter.   Patient/Family in Agreement with Plan yes  (Pt reports his outpatient provider is [facility], he states he will be going to [place] so does not believe he will be following up.)   Final Discharge Disposition Code   (Room In the [facility] shelter)   Final Note Pt discharging to homeless day/night shelter and will independently follow up with [facility] if symptoms worsen.

## 2023-12-07 NOTE — PROGRESS NOTES
" [facility]     Psychiatric Progress Note    Patient Name: Linwood Patel  : 2000  MRN: [id number]  Primary Care Physician:  Provider, No Known  Date of admission: 2023    Subjective   Subjective     Patient seen and chart reviewed, discussed with staff.    Chief Complaint: Depression    HPI:     Staff reports the patient been calm and cooperative.  He is somewhat needy.  Reported anxiety as a 2.    Patient continues isolative in his room.  Patient does participate in interview.  Is calm and cooperative.  Reports he continues to feel better and is continues to improve.  Denies suicidal ideations.  No side effects from medications      Objective   Objective     Vitals:   Temp:  [97.3 °F (36.3 °C)-98.1 °F (36.7 °C)] 97.3 °F (36.3 °C)  Heart Rate:  [76-79] 76  Resp:  [18-20] 20  BP: (131)/(73) 131/73          Mental Status Exam:      Appearance:   Well-developed, well-nourished, calm, cooperative  Reliability:   Good  Eye Contact:   Good  Concentration/Focus:    Attentive  Behaviors:    No restlessness or agitation  Memory :    Sensorium intact  Speech:    Rate and volume  Language:   Appropriate  Mood :    \"I am good\"  Affect:    Euthymic  Thought process:    Goal-directed, linear  Thought Content:    Denies suicidal or homicidal ideation, no hallucinations  Insight:   Fair  Judgement:    Intact      Result Review    Result Review:  I have personally reviewed the results from the time of this admission to 2023 10:26 EST and agree with these findings:  []  Laboratory  []  Microbiology  []  Radiology  []  EKG/Telemetry   []  Cardiology/Vascular   []  Pathology  []  Old records  []  Other:  Most notable findings include:     Lab Results (last 24 hours)       ** No results found for the last 24 hours. **                Medications:   FLUoxetine, 20 mg, Oral, Daily  prazosin, 2 mg, Oral, Nightly  QUEtiapine, 50 mg, Oral, Nightly          Assessment / Plan       Active Hospital Problems:  Active Hospital " Problems    Diagnosis     **Psychosis, unspecified psychosis type     Methamphetamine abuse     Post traumatic stress disorder (PTSD)     Major depressive disorder, recurrent episode, moderate        Plan:     Continue current treatment protocol and titrate medications as clinically indicated  Patient states he was in the [facility] and is agreeable to going to a shelter until he can get travel arranged  Does not want to return to drug rehab at this time  Work on mood stabilization and abatement of any suicidal ideation or psychosis.  Work on appropriate safety plan  Continue supportive therapy  Patient to engage in all group and individual treatment modalities available on the unit  Obtain collateral information if possible  Titrate medications as clinically indicated  Work on appropriate disposition follow-up including referrals to substance abuse treatment if indicated      Disposition:  I expect patient to be discharged 1--2 days.    Part of this note may be an electronic transcription/translation of spoken language to printed text using the Dragon dictation system.         Electronically signed by Roger Valdovinos MD, 12/07/23, 10:26 AM EST.

## 2023-12-07 NOTE — PLAN OF CARE
"Goal Outcome Evaluation:  Plan of Care Reviewed With: patient  Patient Agreement with Plan of Care: agrees     Progress: no change          Patient rating anxiety 2/10 and depression 0/10. Denies HI/SI. Denies AVH. Patient has been in dayroom this shift. Patient is argumentative with nursing staff. Attempts to push boundaries. Refused to let MHT obtain vitals. Patient allowed this RN to obtain vitals after having explained the necessity related to his scheduled night time meds (Prazosin and Seroquel). Patient immediately stated he would refuse Seroquel but was later compliant when reassured that the dose was 50mg.  Patient states he was \"pissed off\" because he asked two different staff members for orange juice outside of scheduled snack times and was reminded of the snack hours. Patient stated \"My insurance doesn't pay you guys to not give me orange juice.\" Boundaries reinforced and reassured nursing staff would be happy to give him orange juice during snack time. Patient states he would like to take a [employer] bus to [place], [place] upon discharge. Patient is able to make needs known. Will continue plan of care and provide a safe patient environment.   "

## 2023-12-08 ENCOUNTER — HOSPITAL ENCOUNTER (INPATIENT)
Facility: HOSPITAL | Age: 23
LOS: 3 days | Discharge: HOME | DRG: 885 | End: 2023-12-11
Attending: PSYCHIATRY & NEUROLOGY | Admitting: PSYCHIATRY & NEUROLOGY
Payer: MEDICAID

## 2023-12-08 LAB
ALBUMIN SERPL-MCNC: 4.9 G/DL (ref 3.5–5.2)
ALBUMIN/GLOB SERPL: 1.8 G/DL
ALP SERPL-CCNC: 101 U/L (ref 39–117)
ALT SERPL W P-5'-P-CCNC: 54 U/L (ref 1–41)
AMPHET+METHAMPHET UR QL: NEGATIVE
ANION GAP SERPL CALCULATED.3IONS-SCNC: 13.1 MMOL/L (ref 5–15)
APAP SERPL-MCNC: <5 MCG/ML (ref 0–30)
AST SERPL-CCNC: 23 U/L (ref 1–40)
BARBITURATES UR QL SCN: NEGATIVE
BENZODIAZ UR QL SCN: NEGATIVE
BILIRUB SERPL-MCNC: 0.2 MG/DL (ref 0–1.2)
BUN SERPL-MCNC: 15 MG/DL (ref 6–20)
BUN/CREAT SERPL: 16.5 (ref 7–25)
CALCIUM SPEC-SCNC: 9.8 MG/DL (ref 8.6–10.5)
CANNABINOIDS SERPL QL: NEGATIVE
CHLORIDE SERPL-SCNC: 103 MMOL/L (ref 98–107)
CO2 SERPL-SCNC: 25.9 MMOL/L (ref 22–29)
COCAINE UR QL: NEGATIVE
CREAT SERPL-MCNC: 0.91 MG/DL (ref 0.76–1.27)
EGFRCR SERPLBLD CKD-EPI 2021: 121.5 ML/MIN/1.73
ETHANOL BLD-MCNC: <10 MG/DL (ref 0–10)
ETHANOL UR QL: <0.01 %
FENTANYL UR-MCNC: NEGATIVE NG/ML
GLOBULIN UR ELPH-MCNC: 2.8 GM/DL
GLUCOSE SERPL-MCNC: 90 MG/DL (ref 65–99)
HOLD SPECIMEN: NORMAL
HOLD SPECIMEN: NORMAL
METHADONE UR QL SCN: NEGATIVE
OPIATES UR QL: NEGATIVE
OXYCODONE UR QL SCN: NEGATIVE
POTASSIUM SERPL-SCNC: 4.4 MMOL/L (ref 3.5–5.2)
PROT SERPL-MCNC: 7.7 G/DL (ref 6–8.5)
SALICYLATES SERPL-MCNC: <0.3 MG/DL
SODIUM SERPL-SCNC: 142 MMOL/L (ref 136–145)
T4 FREE SERPL-MCNC: 1.11 NG/DL (ref 0.93–1.7)
TSH SERPL DL<=0.05 MIU/L-ACNC: 2.41 UIU/ML (ref 0.27–4.2)
WHOLE BLOOD HOLD COAG: NORMAL
WHOLE BLOOD HOLD SPECIMEN: NORMAL

## 2023-12-08 PROCEDURE — 80307 DRUG TEST PRSMV CHEM ANLYZR: CPT | Performed by: EMERGENCY MEDICINE

## 2023-12-08 RX ORDER — LORAZEPAM 2 MG/ML
2 INJECTION INTRAMUSCULAR EVERY 4 HOURS PRN
Status: DISCONTINUED | OUTPATIENT
Start: 2023-12-08 | End: 2023-12-11 | Stop reason: HOSPADM

## 2023-12-08 RX ORDER — DIPHENHYDRAMINE HYDROCHLORIDE 50 MG/ML
50 INJECTION INTRAMUSCULAR; INTRAVENOUS EVERY 4 HOURS PRN
Status: DISCONTINUED | OUTPATIENT
Start: 2023-12-08 | End: 2023-12-11 | Stop reason: HOSPADM

## 2023-12-08 RX ORDER — LOPERAMIDE HYDROCHLORIDE 2 MG/1
2 CAPSULE ORAL
Status: DISCONTINUED | OUTPATIENT
Start: 2023-12-08 | End: 2023-12-11 | Stop reason: HOSPADM

## 2023-12-08 RX ORDER — QUETIAPINE FUMARATE 25 MG/1
50 TABLET, FILM COATED ORAL NIGHTLY
Status: DISCONTINUED | OUTPATIENT
Start: 2023-12-08 | End: 2023-12-09

## 2023-12-08 RX ORDER — LORAZEPAM 2 MG/1
2 TABLET ORAL EVERY 4 HOURS PRN
Status: DISCONTINUED | OUTPATIENT
Start: 2023-12-08 | End: 2023-12-11 | Stop reason: HOSPADM

## 2023-12-08 RX ORDER — FLUOXETINE HYDROCHLORIDE 20 MG/1
20 CAPSULE ORAL DAILY
Status: DISCONTINUED | OUTPATIENT
Start: 2023-12-08 | End: 2023-12-11 | Stop reason: HOSPADM

## 2023-12-08 RX ORDER — HALOPERIDOL 5 MG/1
5 TABLET ORAL EVERY 4 HOURS PRN
Status: DISCONTINUED | OUTPATIENT
Start: 2023-12-08 | End: 2023-12-11 | Stop reason: HOSPADM

## 2023-12-08 RX ORDER — ACETAMINOPHEN 325 MG/1
650 TABLET ORAL EVERY 4 HOURS PRN
Status: DISCONTINUED | OUTPATIENT
Start: 2023-12-08 | End: 2023-12-11 | Stop reason: HOSPADM

## 2023-12-08 RX ORDER — NICOTINE 21 MG/24HR
1 PATCH, TRANSDERMAL 24 HOURS TRANSDERMAL DAILY PRN
Status: DISCONTINUED | OUTPATIENT
Start: 2023-12-08 | End: 2023-12-11 | Stop reason: HOSPADM

## 2023-12-08 RX ORDER — PRAZOSIN HYDROCHLORIDE 1 MG/1
2 CAPSULE ORAL NIGHTLY
Status: DISCONTINUED | OUTPATIENT
Start: 2023-12-08 | End: 2023-12-11 | Stop reason: HOSPADM

## 2023-12-08 RX ORDER — DIPHENHYDRAMINE HCL 50 MG
50 CAPSULE ORAL EVERY 4 HOURS PRN
Status: DISCONTINUED | OUTPATIENT
Start: 2023-12-08 | End: 2023-12-11 | Stop reason: HOSPADM

## 2023-12-08 RX ORDER — TRAZODONE HYDROCHLORIDE 50 MG/1
100 TABLET ORAL NIGHTLY PRN
Status: DISCONTINUED | OUTPATIENT
Start: 2023-12-08 | End: 2023-12-11 | Stop reason: HOSPADM

## 2023-12-08 RX ORDER — HALOPERIDOL 5 MG/ML
5 INJECTION INTRAMUSCULAR EVERY 4 HOURS PRN
Status: DISCONTINUED | OUTPATIENT
Start: 2023-12-08 | End: 2023-12-11 | Stop reason: HOSPADM

## 2023-12-08 RX ORDER — HYDROXYZINE PAMOATE 50 MG/1
50 CAPSULE ORAL EVERY 6 HOURS PRN
Status: DISCONTINUED | OUTPATIENT
Start: 2023-12-08 | End: 2023-12-11 | Stop reason: HOSPADM

## 2023-12-08 RX ORDER — ALUMINA, MAGNESIA, AND SIMETHICONE 2400; 2400; 240 MG/30ML; MG/30ML; MG/30ML
15 SUSPENSION ORAL EVERY 6 HOURS PRN
Status: DISCONTINUED | OUTPATIENT
Start: 2023-12-08 | End: 2023-12-11 | Stop reason: HOSPADM

## 2023-12-08 RX ORDER — ALBUTEROL SULFATE 90 UG/1
2 AEROSOL, METERED RESPIRATORY (INHALATION) EVERY 4 HOURS PRN
Status: DISCONTINUED | OUTPATIENT
Start: 2023-12-08 | End: 2023-12-11 | Stop reason: HOSPADM

## 2023-12-08 RX ADMIN — FLUOXETINE 20 MG: 20 CAPSULE ORAL at 09:42

## 2023-12-08 RX ADMIN — PRAZOSIN HYDROCHLORIDE 2 MG: 1 CAPSULE ORAL at 20:08

## 2023-12-08 RX ADMIN — ALUMINUM HYDROXIDE, MAGNESIUM HYDROXIDE, AND DIMETHICONE 15 ML: 400; 400; 40 SUSPENSION ORAL at 03:30

## 2023-12-08 RX ADMIN — QUETIAPINE FUMARATE 50 MG: 25 TABLET ORAL at 03:29

## 2023-12-08 RX ADMIN — ACETAMINOPHEN 650 MG: 325 TABLET ORAL at 17:20

## 2023-12-08 RX ADMIN — QUETIAPINE FUMARATE 50 MG: 25 TABLET ORAL at 20:08

## 2023-12-08 NOTE — INTERVAL H&P NOTE
"  H&P updated. The patient was examined and the following changes are noted:        Chief complaint: \"I went out and got drunk, and I was suicidal\"    HPI: Patient was discharged yesterday.  Patient discharged after demanding to leave the hospital.  He denied suicidal ideation over his previous 2 days of hospitalization, and reported his mood as being very good.  Patient left the hospital and was to go to a day shelter, but did not go.  He came back to the emergency room stating that he had been drinking 1/5 of vodka and was having suicidal ideations.  His alcohol level was undetectable.  His toxicology screen was negative.    Patient tells me today that he was lying yesterday when he said he did not have suicidal ideation and that he was doing better.  He continues to endorse depression.  Tells me today that suicidal ideations \"are off and on.\"  However, he follows this up by stating that he has no specific plan and that he has no intention to do anything to harm himself.    Patient was demanding to leave the hospital yesterday.  The patient had nowhere to go and it appears that he walks a shelter been almost an hour.  Patient did not relapse on substances as he claims.  Appears the patient had nowhere to go came back to the hospital reporting suicidal ideations.  There are elements of malingering.  Patient also has very low frustration tolerance.  He has a long history of homelessness and substance use with multiple hospitalizations.    Mental status exam: Awake, alert, oriented male appears appropriate for stated age.  He is calm, cooperative, engaging, makes good eye contact.  There is no psychomotor restlessness or agitation.  He participates fully in interview.  His sensorium is intact.  Speech is articulate, fluent, normal rate and volume.  Language is appropriate relevant.  Mood described as \"mixed, no sad feelings\" and his affect is euthymic.  Thought processes are linear and goal directed.  Thought content " "is \"off and on\" for suicidal ideations and quickly has that he has no intention or plan of harming himself.  No homicidal ideation and no hallucinations.  Insight and judgment are limited    Assessment:  Major depression  Substance use    Plan:  Continue meds that had just been started  Work on appropriate placement to rehab or homeless shelter  Estimated length stay in the hospital 2 to 3 days      "

## 2023-12-08 NOTE — DISCHARGE SUMMARY
"[facility] [place], [place]         DISCHARGE SUMMARY    Patient Name: Linwood Patel  : 2000  MRN: [id number]    Date of Admission: 2023  Date of Discharge: 2023  Primary Care Physician: Provider, No Known    Consults       No orders found from 2023 to 2023.            Presenting Problem:   Psychosis, unspecified psychosis type [F29]    Active and Resolved Hospital Problems:  Active Hospital Problems    Diagnosis POA    Methamphetamine abuse [F15.10] Yes    Post traumatic stress disorder (PTSD) [F43.10] Yes    Major depressive disorder, recurrent episode, moderate [F33.1] Yes      Resolved Hospital Problems    Diagnosis POA    **Psychosis, unspecified psychosis type [F29] Yes         Hospital Course     Hospital Course:  Linwood Patel is a 23 y.o. male admitted for exacerbation depression with suicidal ideation and substance use.    Patient did drug and alcohol rehabilitation was asked to leave the program.  He came to the emergency room and initially was denying suicidal ideation.  However when it came time to discharge being expressed suicidal ideation and refused to be discharged.  The patient was subsequently admitted.    Patient been off of medications or felt that his previous medication regimen was the best plan.  He was started on quetiapine 50 mg at bedtime, and fluoxetine.  He was also started prazosin nightmares with history of PTSD.    Patient quickly stabilized.  Suicidal ideations quickly dissipated.  On day of discharge he reported his mood was \"great.\"  Patient had a plan to go to a local day shelter that has resources to help with obtaining a bus ticket.  However, after he left expressed with staff.  Patient has been free of suicidal ideations and on day of discharge reported his mood is \"great\" and had a euthymic affect.  The patient was discharged.        DISCHARGE Follow Up Recommendations for labs and diagnostics: Lipid and glucose monitoring, routine labs from primary " [facility]      Day of Discharge     Vital Signs:  Temp:  [97.3 °F (36.3 °C)-98.3 °F (36.8 °C)] 97.9 °F (36.6 °C)  Heart Rate:  [76-98] 98  Resp:  [18-20] 18  BP: (123-140)/(89-93) 123/93      Pertinent  and/or Most Recent Results     LAB RESULTS:      Lab 12/07/23  2326 12/04/23  1356   WBC 10.97* 6.36   HEMOGLOBIN 16.2 14.4   HEMATOCRIT 48.1 43.8   PLATELETS 332 287   NEUTROS ABS 6.60 3.86   IMMATURE GRANS (ABS) 0.03 0.02   LYMPHS ABS 3.54* 1.87   MONOS ABS 0.60 0.46   EOS ABS 0.18 0.13   MCV 80.2 81.7         Lab 12/07/23  2340 12/07/23  2326 12/04/23  1356   SODIUM  --  142 142   POTASSIUM  --  4.4 4.6   CHLORIDE  --  103 106   CO2  --  25.9 26.4   ANION GAP  --  13.1 9.6   BUN  --  15 13   CREATININE  --  0.91 0.91   EGFR  --  121.5 121.5   GLUCOSE  --  90 105*   CALCIUM  --  9.8 9.5   TSH 2.410  --   --          Lab 12/07/23  2326 12/04/23  1356   TOTAL PROTEIN 7.7 6.6   ALBUMIN 4.9 4.1   GLOBULIN 2.8 2.5   ALT (SGPT) 54* 54*   AST (SGOT) 23 28   BILIRUBIN 0.2 0.4   ALK PHOS 101 87                                     Lab 12/07/23  2326   ETHANOL PCT <0.010   ETHANOL MGDL <10         Lab 12/08/23  0032 12/04/23  1424   AMPH/METHAM SCREEN, URINE Negative Positive*   BENZODIAZEPINE SCREEN, URINE Negative Negative   COCAINE SCREEN, URINE Negative Negative   OPIATES Negative Negative   THC URINE SCREEN Negative Negative   METHADONE SCREEN, URINE Negative Negative     Brief Urine Lab Results  (Last result in the past 365 days)        Color   Clarity   Blood   Leuk Est   Nitrite   Protein   CREAT   Urine HCG        11/21/23 1547 Yellow   Clear     Negative                          XR Chest 1 View    Result Date: 11/28/2023  Impression: No change with no acute finding CRITICAL RESULT:   No. COMMUNICATION: Per this written report. Drafted by Escobar Hwang MD on 11/28/2023 9:30 AM Final report signed by Escoabr Hwang MD on 11/28/2023 9:31 AM                 Imaging Results (Last 7 Days)       ** No  results found for the last 168 hours. **             Labs Pending at Discharge:           Discharge Details        Discharge Medications        Changes to Medications        Instructions Start Date   hydrOXYzine pamoate 25 MG capsule  Commonly known as: VISTARIL  What changed: reasons to take this   25 mg, Oral, 3 Times Daily PRN      QUEtiapine 50 MG tablet  Commonly known as: SEROquel  What changed:   medication strength  how much to take  Another medication with the same name was removed. Continue taking this medication, and follow the directions you see here.   50 mg, Oral, Nightly             Continue These Medications        Instructions Start Date   FLUoxetine 20 MG capsule  Commonly known as: PROzac   Take 1 capsule by mouth daily.      prazosin 2 MG capsule  Commonly known as: MINIPRESS   Take 1 capsule by mouth every night.      traZODone 50 MG tablet  Commonly known as: DESYREL   50 mg, Oral, Nightly      Ventolin  (90 Base) MCG/ACT inhaler  Generic drug: albuterol sulfate HFA   2 puffs, Inhalation, Every 6 Hours PRN             Stop These Medications      atomoxetine 40 MG capsule  Commonly known as: STRATTERA     cyclobenzaprine 5 MG tablet  Commonly known as: FLEXERIL              Allergies   Allergen Reactions    Capsicum Annuum Extract & Derivative (Bell Pepper) [Capsicum] Hives    Fish-Derived Products Anaphylaxis    Onion Anaphylaxis    Peanut-Containing Drug Products Hives    Tomato Itching         Discharge Disposition:  Home or Self Care    Diet:  Hospital:No active diet order        Discharge Activity: Ad donovan.  Activity Instructions       Activity as Tolerated              Discharge Condition: Good    CODE STATUS:  Code Status and Medical Interventions:   Ordered at: 12/04/23 4503     Code Status (Patient has no pulse and is not breathing):    CPR (Attempt to Resuscitate)     Medical Interventions (Patient has pulse or is breathing):    Full Support         No future  appointments.    Additional Instructions for the Follow-ups that You Need to Schedule       Discharge Follow-up with PCP   As directed       Currently Documented PCP:    Provider, No Known    PCP Phone Number:    None     Follow Up Details: as needed        Discharge Follow-up with Specified Provider: Community mental health   As directed      To: Community mental health        Discharge Follow-up with Specified Provider: Substance rehab   As directed      To: Substance rehab                Time spent on Discharge including face to face service:  20 minutes    Part of this note may be an electronic transcription/translation of spoken language to printed text using the Dragon dictation system.        Electronically signed by Roger Valdovinos MD, 12/08/23, 8:25 AM EST.
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
## 2023-12-08 NOTE — PLAN OF CARE
Goal Outcome Evaluation:  Plan of Care Reviewed With: patient  Patient Agreement with Plan of Care: agrees            Pt has been withdrawn to bed for the day. He endorses SI, but denies plan and intent. He denies HI, AVH, and contracts for safety. He rates depression 10 and denies all other s/s. He has blunt affect and intermittent eye contact. He reports sleeping well last night.

## 2023-12-08 NOTE — NURSING NOTE
"Voluntary admit from ED, admitted under Dr. Valdovinos with Dx Depression. Pt searched for safety via male security staff. Pt cooperative with search and admit assessment. Per Andria, ED Nurse, pt came to ED after being discharged from [facility] earlier in the day and drinking \"a bottle\" of Vodka, ETOH labwork WDL. Per ED Nurse, pt was stating that he was suicidal and lied to  doctor to be released before he was ready. Per ED nurse, pt stated that he was going to either slit throat, walk into traffic or hang self. Upon admission, pt calm, cooperative, mood stable. Pt stated that he was having SI with no plan and contracted for safety. Per columbia scale, pt placed on close watch. UDS Negative, WBC elevated and RBC elevated. Pt denies HI, AVH, and Anxiety. Pt rates depression 10. Pt med compliant.   "

## 2023-12-09 RX ORDER — QUETIAPINE FUMARATE 100 MG/1
100 TABLET, FILM COATED ORAL NIGHTLY
Status: DISCONTINUED | OUTPATIENT
Start: 2023-12-09 | End: 2023-12-11 | Stop reason: HOSPADM

## 2023-12-09 RX ADMIN — PRAZOSIN HYDROCHLORIDE 2 MG: 1 CAPSULE ORAL at 20:32

## 2023-12-09 RX ADMIN — FLUOXETINE 20 MG: 20 CAPSULE ORAL at 08:48

## 2023-12-09 RX ADMIN — QUETIAPINE FUMARATE 100 MG: 100 TABLET ORAL at 20:32

## 2023-12-09 NOTE — PLAN OF CARE
Goal Outcome Evaluation:  Plan of Care Reviewed With: patient  Patient Agreement with Plan of Care: agrees      Pt. Is out in the lounge watching tv this afternoon. Pt. Reports not sleeping well and needing an increase to his seroquel as well as requesting double portions for meals. Pt. Reports vugue si, with no intent or plan and denied any hi/avh. Will con't to monitor and provide a safe environment.

## 2023-12-09 NOTE — PLAN OF CARE
Goal Outcome Evaluation:  Plan of Care Reviewed With: patient  Patient Agreement with Plan of Care: agrees   Pt up to dayroom, requesting double portions. Pt med compliant, redirection taken well. Pt rated depression 7; denied anxiety, AVH, SI, HI. No distress noted.

## 2023-12-09 NOTE — PROGRESS NOTES
" [facility]     Psychiatric Progress Note    Patient Name: Linwood Patel  : 2000  MRN: [id number]  Primary Care Physician:  Provider, No Known  Date of admission: 2023    Subjective   Subjective     Patient seen and chart reviewed, discussed with staff.    Chief Complaint: Depression      HPI:     Staff reports the patient has been cooperative.  He has been complaining of poor sleep but is noted to have slept very well.  Had some vague suicidal ideations, but no plan or intent.  Rated depression as 7 and anxiety as a 0.    Patient today reports he is doing well but continues to feel a little depressed.  States that he misses his family in [place] and hopes to get back to them.  Denies side effects from medications.  Does report that he had poor sleep and his mood does not seem right and would like an increase of quetiapine to 100 mg.      Objective   Objective     Vitals:   Temp:  [98.2 °F (36.8 °C)] 98.2 °F (36.8 °C)  Heart Rate:  [] 111  Resp:  [18] 18  BP: (112-115)/(71-88) 115/88          Mental Status Exam:      Appearance:   Well-developed, nourished, calm, cooperative  Reliability:   Fair  Eye Contact:   Good  Concentration/Focus:    Attentive  Behaviors:    No agitation or restlessness  Memory :    Intact  Speech:    Normal rate and volume  Language:   Appropriate, relevant  Mood : \"Fair, okay\"  Affect:    Slightly blunted  Thought process:    Goal directed, linear  Thought Content:    Denies suicidal or homicidal ideation, no hallucinations  Insight:   Limited  Judgement:    Intact, no behavioral disturbance      Result Review    Result Review:  I have personally reviewed the results from the time of this admission to 2023 12:38 EST and agree with these findings:  []  Laboratory  []  Microbiology  []  Radiology  []  EKG/Telemetry   []  Cardiology/Vascular   []  Pathology  []  Old records  []  Other:  Most notable findings include:     Lab Results (last 24 hours)       ** No " results found for the last 24 hours. **                Medications:   FLUoxetine, 20 mg, Oral, Daily  prazosin, 2 mg, Oral, Nightly  QUEtiapine, 100 mg, Oral, Nightly          Assessment / Plan       Active Hospital Problems:  Active Hospital Problems    Diagnosis     **Depression        Plan:     Increase quetiapine to 100 mg  Continue meds as currently ordered and titrate as clinically indicated  Work on appropriate disposition to the homeless shelter and/or assistance with getting back to family in [place]  Work on mood stabilization and abatement of any suicidal ideation or psychosis.  Work on appropriate safety plan  Continue supportive therapy  Patient to engage in all group and individual treatment modalities available on the unit  Obtain collateral information if possible  Titrate medications as clinically indicated  Work on appropriate disposition follow-up including referrals to substance abuse treatment if indicated      Disposition:  I expect patient to be discharged to 3 days.    Part of this note may be an electronic transcription/translation of spoken language to printed text using the Dragon dictation system.         Electronically signed by Roger Valdovinos MD, 12/09/23, 12:38 PM EST.

## 2023-12-10 LAB
QT INTERVAL: 353 MS
QTC INTERVAL: 439 MS

## 2023-12-10 RX ADMIN — QUETIAPINE FUMARATE 100 MG: 100 TABLET ORAL at 21:50

## 2023-12-10 RX ADMIN — FLUOXETINE 20 MG: 20 CAPSULE ORAL at 08:20

## 2023-12-10 RX ADMIN — PRAZOSIN HYDROCHLORIDE 2 MG: 1 CAPSULE ORAL at 21:50

## 2023-12-10 NOTE — PLAN OF CARE
Goal Outcome Evaluation:  Plan of Care Reviewed With: patient  Patient Agreement with Plan of Care: agrees   Pt. Has been calm and cooperative, reports not sleeping well, but is hoping to be discharged tomorrow. Pt. Is denying any si/hi/avh and makes his needs known. Will con't to monitor and provide a safe environment.

## 2023-12-10 NOTE — PROGRESS NOTES
" [facility]     Psychiatric Progress Note    Patient Name: Linwood Patel  : 2000  MRN: [id number]  Primary Care Physician:  Provider, No Known  Date of admission: 2023    Subjective   Subjective     Patient seen and chart reviewed, discussed with staff.    Chief Complaint: Depression      HPI:     Staff reports the patient complained of waking up in the middle of the night, but has been a lot of time in his room sleeping.  Rated anxiety as a 6 and depression as a 5.  He is denying suicidal ideations.    Patient today is calm and cooperative participates in interview.  Reports his mood is mixed and has \"a bunch of different feels\" including some depression, happiness, and sadness.     Voices that he would like to go to the day shelter and try to find transportation back to [place].  States he will need help with transportation to the day shelter.      Objective   Objective     Vitals:   Temp:  [98.1 °F (36.7 °C)-98.3 °F (36.8 °C)] 98.3 °F (36.8 °C)  Heart Rate:  [] 116  Resp:  [18] 18  BP: (115-122)/(85-88) 122/85          Mental Status Exam:      Appearance:   Well-developed, nourished, calm, cooperative  Reliability:   Good  Eye Contact:   Good  Concentration/Focus:    Attentive to the interview  Behaviors:    No restlessness or agitation  Memory :    Sensorium intact, no deficits  Speech:    Spontaneous, normal rate and volume  Language:   Appropriate, relevant  Mood :    \"Mixed\"  Affect:    Blunted  Thought process:    Goal directed, linear, no thought disorganization  Thought Content:    Denies suicidal or homicidal ideation, denies hallucinations  Insight:   Fair  Judgement:    No behavioral disturbance      Result Review    Result Review:  I have personally reviewed the results from the time of this admission to 12/10/2023 10:14 EST and agree with these findings:  []  Laboratory  []  Microbiology  []  Radiology  []  EKG/Telemetry   []  Cardiology/Vascular   []  Pathology  []  Old " records  []  Other:  Most notable findings include:     Lab Results (last 24 hours)       ** No results found for the last 24 hours. **                Medications:   FLUoxetine, 20 mg, Oral, Daily  prazosin, 2 mg, Oral, Nightly  QUEtiapine, 100 mg, Oral, Nightly          Assessment / Plan       Active Hospital Problems:  Active Hospital Problems    Diagnosis     **Depression        Plan:     Continue current treatment protocol and titrate medications as clinically indicated  Work on appropriate disposition and getting into a shelter  Work on mood stabilization and abatement of any suicidal ideation or psychosis.  Work on appropriate safety plan  Continue supportive therapy  Patient to engage in all group and individual treatment modalities available on the unit  Obtain collateral information if possible  Titrate medications as clinically indicated  Work on appropriate disposition follow-up including referrals to substance abuse treatment if indicated      Disposition:  I expect patient to be discharged 1 to 2 days.    Part of this note may be an electronic transcription/translation of spoken language to printed text using the Dragon dictation system.         Electronically signed by Roger Valdovinos MD, 12/10/23, 10:14 AM EST.

## 2023-12-10 NOTE — PLAN OF CARE
Goal Outcome Evaluation:  Plan of Care Reviewed With: patient  Patient Agreement with Plan of Care: agrees   Pt up to dayroom, interacts with peers appropriately. Pt focuses on food, asks for snacks frequently. Pt rated depression 5. Denied Anxiety, SI, HI, AVH. Pt jovial. No distress noted.    Pt slept through night.

## 2023-12-11 VITALS
BODY MASS INDEX: 33.67 KG/M2 | DIASTOLIC BLOOD PRESSURE: 80 MMHG | RESPIRATION RATE: 20 BRPM | HEART RATE: 116 BPM | OXYGEN SATURATION: 99 % | WEIGHT: 262.4 LBS | HEIGHT: 74 IN | TEMPERATURE: 97.8 F | SYSTOLIC BLOOD PRESSURE: 138 MMHG

## 2023-12-11 RX ORDER — PRAZOSIN HYDROCHLORIDE 2 MG/1
2 CAPSULE ORAL NIGHTLY
Qty: 30 CAPSULE | Refills: 0 | Status: SHIPPED | OUTPATIENT
Start: 2023-12-11 | End: 2023-12-11 | Stop reason: SDUPTHER

## 2023-12-11 RX ORDER — PRAZOSIN HYDROCHLORIDE 2 MG/1
2 CAPSULE ORAL NIGHTLY
Qty: 30 CAPSULE | Refills: 0 | Status: SHIPPED | OUTPATIENT
Start: 2023-12-11

## 2023-12-11 RX ORDER — QUETIAPINE FUMARATE 100 MG/1
100 TABLET, FILM COATED ORAL NIGHTLY
Qty: 30 TABLET | Refills: 1 | Status: SHIPPED | OUTPATIENT
Start: 2023-12-11 | End: 2023-12-11 | Stop reason: SDUPTHER

## 2023-12-11 RX ORDER — QUETIAPINE FUMARATE 100 MG/1
100 TABLET, FILM COATED ORAL NIGHTLY
Qty: 30 TABLET | Refills: 1 | Status: SHIPPED | OUTPATIENT
Start: 2023-12-11

## 2023-12-11 RX ORDER — FLUOXETINE HYDROCHLORIDE 20 MG/1
20 CAPSULE ORAL DAILY
Qty: 30 CAPSULE | Refills: 0 | Status: SHIPPED | OUTPATIENT
Start: 2023-12-11

## 2023-12-11 RX ORDER — FLUOXETINE HYDROCHLORIDE 20 MG/1
20 CAPSULE ORAL DAILY
Qty: 30 CAPSULE | Refills: 0 | Status: SHIPPED | OUTPATIENT
Start: 2023-12-11 | End: 2023-12-11 | Stop reason: SDUPTHER

## 2023-12-11 RX ADMIN — FLUOXETINE 20 MG: 20 CAPSULE ORAL at 09:39

## 2023-12-11 NOTE — SIGNIFICANT NOTE
12/11/23 1306   Plan   Plan Patient will be discharging to local homeless shelter patient provided with information regarding day and night shelter patient aware of sign in hours for bed reservation   Patient/Family in Agreement with Plan yes   Final Discharge Disposition Code   (Local Shelter Room in the [facility])   Final Note Patient to follow-up with local community mental health agency for outpatient care.  [facility]

## 2023-12-11 NOTE — PLAN OF CARE
"Goal Outcome Evaluation:  Plan of Care Reviewed With: patient  Patient Agreement with Plan of Care: agrees           Pt is alert and oriented and able to make needs known.  Pt denies SI or HI.  Pt denies a/v/h.  Pt had snack and reports plans to discharge to [facility] tomorrow.  Pt was cooperative with night meds and actively participated in group.  No s/s of acute distress observed at this time.        "

## 2023-12-11 NOTE — DISCHARGE SUMMARY
[facility]         DISCHARGE SUMMARY    Patient Name: Linwood Patel  : 2000  MRN: [id number]    Date of Admission: 2023  Date of Discharge: 2023  Primary Care Physician: Provider, No Known    Consults       No orders found from 2023 to 2023.            Presenting Problem:   Depression [F32.A]    Active and Resolved Hospital Problems:  Active Hospital Problems    Diagnosis POA    **Major depressive disorder, recurrent episode, moderate [F33.1] Yes    Cannabis abuse [F12.10] Yes    Post traumatic stress disorder (PTSD) [F43.10] Yes    Amphetamine misuse [F15.90] Yes    Tobacco abuse [Z72.0] Yes    Obesity [E66.9] Yes      Resolved Hospital Problems   No resolved problems to display.         Hospital Course     Hospital Course:  Linwood Patel is a 23 y.o. male voluntary basis from the emergency room for exacerbation of suicidal ideations.  Patient had been discharged earlier today from [facility].  Left the hospital reported becoming increasingly depressed and felt that he left too soon.  Reports he began having suicidal ideations and came back into the hospital.    Patient was maintained on fluoxetine and then started at this previous admission.  He has tolerated the medication well.    Patient continued reported residual symptoms of depression as well as poor sleep and his prior dose of quetiapine was increased to 100 mg.    Has been calm and cooperative stay.  He had no acute psychomotor restlessness or agitation.  He has not required any medicines for acute agitation.  He has been pleasant and engaging.  He has been appropriate with peers and staff.  Patient today reports he is feeling much better and is requesting discharge.    On day of discharge he is calm, cooperative, engaging, and makes good eye contact.  There is no psychomotor restlessness or agitation.  He is future oriented goal directed.  Speech is articulate, fluent, normal rate and volume.  Language is  "appropriate relevant.  Mood is described as \"it's good\" affect is euthymic and appropriate.  Thought processes are linear and goal directed.  Thought content is negative for suicidal or homicidal ideations there is no hallucinations.  Insight and judgment are intact.    Addendum: Patient reported mood was changed due to inaccurately documenting it, and attributing when another patient said to this patient while documenting.    DISCHARGE Follow Up Recommendations for labs and diagnostics: Lipid and glucose monitoring, routine health from primary care, [facility]      Day of Discharge     Vital Signs:  Temp:  [97.8 °F (36.6 °C)-98.1 °F (36.7 °C)] 97.8 °F (36.6 °C)  Heart Rate:  [110-116] 116  Resp:  [18-20] 20  BP: (137-138)/(80-83) 138/80      Pertinent  and/or Most Recent Results     LAB RESULTS:      Lab 12/07/23 2326 12/04/23  1356   WBC 10.97* 6.36   HEMOGLOBIN 16.2 14.4   HEMATOCRIT 48.1 43.8   PLATELETS 332 287   NEUTROS ABS 6.60 3.86   IMMATURE GRANS (ABS) 0.03 0.02   LYMPHS ABS 3.54* 1.87   MONOS ABS 0.60 0.46   EOS ABS 0.18 0.13   MCV 80.2 81.7         Lab 12/07/23  2340 12/07/23 2326 12/04/23  1356   SODIUM  --  142 142   POTASSIUM  --  4.4 4.6   CHLORIDE  --  103 106   CO2  --  25.9 26.4   ANION GAP  --  13.1 9.6   BUN  --  15 13   CREATININE  --  0.91 0.91   EGFR  --  121.5 121.5   GLUCOSE  --  90 105*   CALCIUM  --  9.8 9.5   TSH 2.410  --   --          Lab 12/07/23  2326 12/04/23  1356   TOTAL PROTEIN 7.7 6.6   ALBUMIN 4.9 4.1   GLOBULIN 2.8 2.5   ALT (SGPT) 54* 54*   AST (SGOT) 23 28   BILIRUBIN 0.2 0.4   ALK PHOS 101 87                                     Lab 12/07/23  2326   ETHANOL PCT <0.010   ETHANOL MGDL <10         Lab 12/08/23  0032 12/04/23  1424   AMPH/METHAM SCREEN, URINE Negative Positive*   BENZODIAZEPINE SCREEN, URINE Negative Negative   COCAINE SCREEN, URINE Negative Negative   OPIATES Negative Negative   THC URINE SCREEN Negative Negative   METHADONE SCREEN, URINE Negative Negative "     Brief Urine Lab Results  (Last result in the past 365 days)        Color   Clarity   Blood   Leuk Est   Nitrite   Protein   CREAT   Urine HCG        11/21/23 1547 Yellow   Clear     Negative                          XR Chest 1 View    Result Date: 11/28/2023  Impression: No change with no acute finding CRITICAL RESULT:   No. COMMUNICATION: Per this written report. Drafted by Escobar Hwang MD on 11/28/2023 9:30 AM Final report signed by Escobar Hwang MD on 11/28/2023 9:31 AM                 Imaging Results (Last 7 Days)       ** No results found for the last 168 hours. **             Labs Pending at Discharge:           Discharge Details        Discharge Medications        Changes to Medications        Instructions Start Date   QUEtiapine 100 MG tablet  Commonly known as: SEROquel  What changed:   medication strength  how much to take   100 mg, Oral, Nightly             Continue These Medications        Instructions Start Date   FLUoxetine 20 MG capsule  Commonly known as: PROzac   Take 1 capsule by mouth daily.      hydrOXYzine pamoate 25 MG capsule  Commonly known as: VISTARIL   25 mg, Oral, 3 Times Daily PRN      prazosin 2 MG capsule  Commonly known as: MINIPRESS   Take 1 capsule by mouth every night.      Ventolin  (90 Base) MCG/ACT inhaler  Generic drug: albuterol sulfate HFA   2 puffs, Inhalation, Every 6 Hours PRN             Stop These Medications      traZODone 50 MG tablet  Commonly known as: DESYREL              Allergies   Allergen Reactions    Capsicum Annuum Extract & Derivative (Bell Pepper) [Capsicum] Hives    Fish-Derived Products Anaphylaxis    Onion Anaphylaxis    Peanut-Containing Drug Products Hives    Tomato Itching         Discharge Disposition:  Home or Self Care    Diet:  Hospital:  Diet Order   Procedures    Diet: Regular/House Diet; Texture: Regular Texture (IDDSI 7); Fluid Consistency: Thin (IDDSI 0)         Discharge Activity: Ad donovan.  Activity Instructions       Activity as  Tolerated              Discharge Condition: Stable    CODE STATUS:  Code Status and Medical Interventions:   Ordered at: 12/08/23 0242     Code Status (Patient has no pulse and is not breathing):    CPR (Attempt to Resuscitate)     Medical Interventions (Patient has pulse or is breathing):    Full Support         No future appointments.    Additional Instructions for the Follow-ups that You Need to Schedule       Discharge Follow-up with PCP   As directed       Currently Documented PCP:    Provider, No Known    PCP Phone Number:    None     Follow Up Details: As needed        Discharge Follow-up with Specified Provider: [facility]   As directed      To: [facility]        Discharge Follow-up with Specified Provider: Substance treatment   As directed      To: Substance treatment                Time spent on Discharge including face to face service: 30 minutes    Part of this note may be an electronic transcription/translation of spoken language to printed text using the Dragon dictation system.        Electronically signed by Roger Valdovinos MD, 12/11/23, 10:01 AM EST.    Electronically signed by Roger Valdovinos MD, 12/11/23, 10:14 AM EST.

## 2023-12-11 NOTE — PLAN OF CARE
"Goal Outcome Evaluation:  Plan of Care Reviewed With: patient  Patient Agreement with Plan of Care: agrees    Patient is awake and alert, clear speech, poor eye contact, able to make needs known, contracts for safety, pt denies SI,HI and denies AVH, pt denies anxiety and depression, medication compliant. Cooperative and pleasant, pt states his \"ready for discharge\".Pt is being discharge to shelter, room at the [facility], belonging gathered for patient.                   "

## 2024-04-05 ENCOUNTER — HOSPITAL ENCOUNTER (INPATIENT)
Age: 24
LOS: 3 days | Discharge: HOME | DRG: 885 | End: 2024-04-08
Attending: PSYCHIATRY & NEUROLOGY | Admitting: PSYCHIATRY & NEUROLOGY
Payer: MEDICAID

## 2024-04-05 PROBLEM — F33.9 MAJOR DEPRESSION, RECURRENT (HCC): Status: ACTIVE | Noted: 2024-04-05

## 2024-04-05 PROBLEM — F33.2 SEVERE EPISODE OF RECURRENT MAJOR DEPRESSIVE DISORDER, WITHOUT PSYCHOTIC FEATURES (HCC): Status: ACTIVE | Noted: 2024-04-05

## 2024-04-05 PROCEDURE — 6370000000 HC RX 637 (ALT 250 FOR IP): Performed by: PSYCHIATRY & NEUROLOGY

## 2024-04-05 PROCEDURE — 99223 1ST HOSP IP/OBS HIGH 75: CPT | Performed by: PSYCHIATRY & NEUROLOGY

## 2024-04-05 PROCEDURE — 1240000000 HC EMOTIONAL WELLNESS R&B

## 2024-04-05 RX ORDER — HYDROXYZINE 50 MG/1
50 TABLET, FILM COATED ORAL EVERY 6 HOURS PRN
Status: DISCONTINUED | OUTPATIENT
Start: 2024-04-05 | End: 2024-04-08 | Stop reason: HOSPADM

## 2024-04-05 RX ORDER — BENZTROPINE MESYLATE 1 MG/ML
2 INJECTION INTRAMUSCULAR; INTRAVENOUS 2 TIMES DAILY PRN
Status: DISCONTINUED | OUTPATIENT
Start: 2024-04-05 | End: 2024-04-08 | Stop reason: HOSPADM

## 2024-04-05 RX ORDER — ACETAMINOPHEN 325 MG/1
650 TABLET ORAL EVERY 4 HOURS PRN
Status: DISCONTINUED | OUTPATIENT
Start: 2024-04-05 | End: 2024-04-08 | Stop reason: HOSPADM

## 2024-04-05 RX ORDER — IBUPROFEN 400 MG/1
400 TABLET ORAL EVERY 6 HOURS PRN
Status: DISCONTINUED | OUTPATIENT
Start: 2024-04-05 | End: 2024-04-08 | Stop reason: HOSPADM

## 2024-04-05 RX ORDER — MAGNESIUM HYDROXIDE/ALUMINUM HYDROXICE/SIMETHICONE 120; 1200; 1200 MG/30ML; MG/30ML; MG/30ML
30 SUSPENSION ORAL EVERY 6 HOURS PRN
Status: DISCONTINUED | OUTPATIENT
Start: 2024-04-05 | End: 2024-04-08 | Stop reason: HOSPADM

## 2024-04-05 RX ORDER — NICOTINE 21 MG/24HR
1 PATCH, TRANSDERMAL 24 HOURS TRANSDERMAL DAILY
Status: DISCONTINUED | OUTPATIENT
Start: 2024-04-05 | End: 2024-04-08 | Stop reason: HOSPADM

## 2024-04-05 RX ORDER — OLANZAPINE 10 MG/1
10 TABLET ORAL EVERY 4 HOURS PRN
Status: DISCONTINUED | OUTPATIENT
Start: 2024-04-05 | End: 2024-04-08 | Stop reason: HOSPADM

## 2024-04-05 RX ORDER — POLYETHYLENE GLYCOL 3350 17 G
2 POWDER IN PACKET (EA) ORAL
Status: DISCONTINUED | OUTPATIENT
Start: 2024-04-05 | End: 2024-04-08 | Stop reason: HOSPADM

## 2024-04-05 RX ORDER — TRAZODONE HYDROCHLORIDE 50 MG/1
50 TABLET ORAL NIGHTLY PRN
Status: DISCONTINUED | OUTPATIENT
Start: 2024-04-05 | End: 2024-04-08 | Stop reason: HOSPADM

## 2024-04-05 RX ADMIN — TRAZODONE HYDROCHLORIDE 50 MG: 50 TABLET ORAL at 20:19

## 2024-04-05 ASSESSMENT — PATIENT HEALTH QUESTIONNAIRE - PHQ9
SUM OF ALL RESPONSES TO PHQ QUESTIONS 1-9: 2
1. LITTLE INTEREST OR PLEASURE IN DOING THINGS: NOT AT ALL
2. FEELING DOWN, DEPRESSED OR HOPELESS: MORE THAN HALF THE DAYS
SUM OF ALL RESPONSES TO PHQ QUESTIONS 1-9: 2
2. FEELING DOWN, DEPRESSED OR HOPELESS: MORE THAN HALF THE DAYS
SUM OF ALL RESPONSES TO PHQ QUESTIONS 1-9: 2
1. LITTLE INTEREST OR PLEASURE IN DOING THINGS: NOT AT ALL
SUM OF ALL RESPONSES TO PHQ QUESTIONS 1-9: 2
SUM OF ALL RESPONSES TO PHQ9 QUESTIONS 1 & 2: 2
SUM OF ALL RESPONSES TO PHQ9 QUESTIONS 1 & 2: 2
SUM OF ALL RESPONSES TO PHQ QUESTIONS 1-9: 2

## 2024-04-05 ASSESSMENT — SLEEP AND FATIGUE QUESTIONNAIRES
AVERAGE NUMBER OF SLEEP HOURS: 2
SLEEP PATTERN: DISTURBED/INTERRUPTED SLEEP;DIFFICULTY FALLING ASLEEP
DO YOU HAVE DIFFICULTY SLEEPING: YES
DO YOU USE A SLEEP AID: NO
AVERAGE NUMBER OF SLEEP HOURS: 3
DO YOU HAVE DIFFICULTY SLEEPING: YES
SLEEP PATTERN: DIFFICULTY FALLING ASLEEP;DISTURBED/INTERRUPTED SLEEP;RESTLESSNESS
DO YOU USE A SLEEP AID: NO

## 2024-04-05 ASSESSMENT — LIFESTYLE VARIABLES
HOW MANY STANDARD DRINKS CONTAINING ALCOHOL DO YOU HAVE ON A TYPICAL DAY: PATIENT DOES NOT DRINK
HOW OFTEN DO YOU HAVE A DRINK CONTAINING ALCOHOL: NEVER
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
## 2024-04-05 NOTE — PROGRESS NOTES
Behavioral Services  Medicare Certification Upon Admission    I certify that this patient's inpatient psychiatric hospital admission is medically necessary for:    [x] (1) Treatment which could reasonably be expected to improve this patient's condition,       [x] (2) Or for diagnostic study;     AND     [x](2) The inpatient psychiatric services are provided while the individual is under the care of a physician and are included in the individualized plan of care.    Estimated length of stay/service 3 d    Plan for post-hospital care outp    Electronically signed by KAILEY MATOS MD on 4/5/2024 at 2:53 PM

## 2024-04-05 NOTE — BH NOTE
CSSR-S Assessment completed with patient who then scored HIGH RISK.     Provider Dr. Ugalde was notified of HIGH RISK score, via In-person at 1009.      Were suicide precautions ordered: NO    If not ordered, justification as follows: Pt denies suicidal intent, states he has passive SI. He states he will seek out staff if he begins to harm himself.     Completed by: Tiff Villavicencio RN
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
## 2024-04-06 LAB
ANION GAP SERPL CALCULATED.3IONS-SCNC: 10 MMOL/L (ref 3–16)
BASOPHILS # BLD: 0 K/UL (ref 0–0.2)
BASOPHILS NFR BLD: 0.3 %
BUN SERPL-MCNC: 13 MG/DL (ref 7–20)
CALCIUM SERPL-MCNC: 8.8 MG/DL (ref 8.3–10.6)
CHLORIDE SERPL-SCNC: 103 MMOL/L (ref 99–110)
CO2 SERPL-SCNC: 26 MMOL/L (ref 21–32)
CREAT SERPL-MCNC: 0.7 MG/DL (ref 0.9–1.3)
DEPRECATED RDW RBC AUTO: 14 % (ref 12.4–15.4)
EKG ATRIAL RATE: 96 BPM
EKG DIAGNOSIS: NORMAL
EKG P AXIS: 32 DEGREES
EKG P-R INTERVAL: 142 MS
EKG Q-T INTERVAL: 350 MS
EKG QRS DURATION: 84 MS
EKG QTC CALCULATION (BAZETT): 442 MS
EKG R AXIS: 7 DEGREES
EKG T AXIS: 26 DEGREES
EKG VENTRICULAR RATE: 96 BPM
EOSINOPHIL # BLD: 0.2 K/UL (ref 0–0.6)
EOSINOPHIL NFR BLD: 2 %
GFR SERPLBLD CREATININE-BSD FMLA CKD-EPI: >90 ML/MIN/{1.73_M2}
GLUCOSE SERPL-MCNC: 112 MG/DL (ref 70–99)
HCT VFR BLD AUTO: 41.1 % (ref 40.5–52.5)
HGB BLD-MCNC: 13.8 G/DL (ref 13.5–17.5)
LYMPHOCYTES # BLD: 1.2 K/UL (ref 1–5.1)
LYMPHOCYTES NFR BLD: 13.2 %
MCH RBC QN AUTO: 26.4 PG (ref 26–34)
MCHC RBC AUTO-ENTMCNC: 33.4 G/DL (ref 31–36)
MCV RBC AUTO: 79.1 FL (ref 80–100)
MONOCYTES # BLD: 0.5 K/UL (ref 0–1.3)
MONOCYTES NFR BLD: 5.6 %
NEUTROPHILS # BLD: 7.1 K/UL (ref 1.7–7.7)
NEUTROPHILS NFR BLD: 78.9 %
PLATELET # BLD AUTO: 249 K/UL (ref 135–450)
PMV BLD AUTO: 7.4 FL (ref 5–10.5)
POTASSIUM SERPL-SCNC: 4.2 MMOL/L (ref 3.5–5.1)
RBC # BLD AUTO: 5.2 M/UL (ref 4.2–5.9)
SODIUM SERPL-SCNC: 139 MMOL/L (ref 136–145)
WBC # BLD AUTO: 9 K/UL (ref 4–11)

## 2024-04-06 PROCEDURE — 85025 COMPLETE CBC W/AUTO DIFF WBC: CPT

## 2024-04-06 PROCEDURE — 36415 COLL VENOUS BLD VENIPUNCTURE: CPT

## 2024-04-06 PROCEDURE — 93010 ELECTROCARDIOGRAM REPORT: CPT | Performed by: INTERNAL MEDICINE

## 2024-04-06 PROCEDURE — 80048 BASIC METABOLIC PNL TOTAL CA: CPT

## 2024-04-06 PROCEDURE — 99221 1ST HOSP IP/OBS SF/LOW 40: CPT

## 2024-04-06 PROCEDURE — 1240000000 HC EMOTIONAL WELLNESS R&B

## 2024-04-06 PROCEDURE — 93005 ELECTROCARDIOGRAM TRACING: CPT | Performed by: PSYCHIATRY & NEUROLOGY

## 2024-04-06 NOTE — H&P
Hospital Medicine History & Physical      PCP: No, Pcp    Date of Admission: 4/5/2024    Date of Service: Pt seen/examined on 4/6/2024     Chief Complaint:  No chief complaint on file.        History Of Present Illness:      The patient is a 23 y.o. male with pmhx of asthma, depression, drug abuse who presented to [facility] for suicidal ideation.  Patient was seen and evaluated in the ED by the ED medical provider, patient was medically cleared for admission to [facility] at [facility].  This note serves as an admission medical H&P.    Tobacco use: None   ETOH use: Occasionally  Illicit drug use: Methamphetamines     Patient is complaining of right knee stiffness in the morning, no pain currently, no pain to palpation and has full ROM. No recent injury or trauma      Past Medical History:        Diagnosis Date    Asthma     Back pain     Depression     Drug abuse (HCC)     meth use - smokes last used 07/08/2023 1400    Overweight        Past Surgical History:        Procedure Laterality Date    LEG SURGERY Right     Abraham put in leg       Medications Prior to Admission:    Prior to Admission medications    Medication Sig Start Date End Date Taking? Authorizing Provider   divalproex (DEPAKOTE) 500 MG DR tablet Take 1 tablet by mouth 3 times daily  Patient not taking: Reported on 4/5/2024 7/13/23   Paul Ugalde MD   pantoprazole (PROTONIX) 40 MG tablet Take 1 tablet by mouth every morning (before breakfast)  Patient not taking: Reported on 4/5/2024 7/14/23   Paul Ugalde MD       Allergies:  Fish-derived products, Nuts [peanut-containing drug products], Capsicum annuum extract & derivative (bell pepper) [capsicum], Onion, and Tomato    Social History:      TOBACCO:   reports that he has never smoked. He uses smokeless tobacco.  ETOH:   reports current alcohol use.      Family History:   Positive as follows:    No family history on file.    REVIEW OF SYSTEMS:       Constitutional: Negative for fever   HENT: Negative

## 2024-04-06 NOTE — PLAN OF CARE
Problem: Anxiety  Goal: Will report anxiety at manageable levels  Description: INTERVENTIONS:  1. Administer medication as ordered  2. Teach and rehearse alternative coping skills  3. Provide emotional support with 1:1 interaction with staff  4/6/2024 1325 by Riya Bonilla LPN  Outcome: Progressing     Problem: Coping  Goal: Pt/Family able to verbalize concerns and demonstrate effective coping strategies  Description: INTERVENTIONS:  1. Assist patient/family to identify coping skills, available support systems and cultural and spiritual values  2. Provide emotional support, including active listening and acknowledgement of concerns of patient and caregivers  3. Reduce environmental stimuli, as able  4. Instruct patient/family in relaxation techniques, as appropriate  5. Assess for spiritual pain/suffering and initiate Spiritual Care, Psychosocial Clinical Specialist consults as needed  4/6/2024 1325 by Riya Bonilla LPN  Outcome: Progressing     Problem: Depression/Self Harm  Goal: Effect of psychiatric condition will be minimized and patient will be protected from self harm  Description: INTERVENTIONS:  1. Assess impact of patient's symptoms on level of functioning, self care needs and offer support as indicated  2. Assess patient/family knowledge of depression, impact on illness and need for teaching  3. Provide emotional support, presence and reassurance  4. Assess for possible suicidal thoughts or ideation. If patient expresses suicidal thoughts or statements do not leave alone, initiate Suicide Precautions, move to a room close to the nursing station and obtain sitter  5. Initiate consults as appropriate with Mental Health Professional, Spiritual Care, Psychosocial CNS, and consider a recommendation to the LIP for a Psychiatric Consultation  4/6/2024 1325 by Riya Bonilla LPN  Outcome: Progressing     Problem: Pain  Goal: Verbalizes/displays adequate comfort level or baseline comfort

## 2024-04-06 NOTE — BH NOTE
Became verbally angry r/t the TV being turned off during group.Stormed into assigned room and slammed his door. Karri stayed in his room until early afternoon when he come out, put two chairs together to lay down and watch TV. Loud verbally and unable to discuss this matter that could be accepted.

## 2024-04-06 NOTE — PLAN OF CARE
Pt cooperative and out on unit laughing and very talkative with peers. Pt was polite no pm meds schuled. Prn trazodone given I believe it was effective no pt complaints. Pt stated he didn't have any anxiety and his depression was a 2 out 10. Pt denies SI/HI/AVH    Problem: Anxiety  Goal: Will report anxiety at manageable levels  Description: INTERVENTIONS:  1. Administer medication as ordered  2. Teach and rehearse alternative coping skills  3. Provide emotional support with 1:1 interaction with staff  Outcome: Progressing     Problem: Coping  Goal: Pt/Family able to verbalize concerns and demonstrate effective coping strategies  Description: INTERVENTIONS:  1. Assist patient/family to identify coping skills, available support systems and cultural and spiritual values  2. Provide emotional support, including active listening and acknowledgement of concerns of patient and caregivers  3. Reduce environmental stimuli, as able  4. Instruct patient/family in relaxation techniques, as appropriate  5. Assess for spiritual pain/suffering and initiate Spiritual Care, Psychosocial Clinical Specialist consults as needed  Outcome: Progressing     Problem: Depression/Self Harm  Goal: Effect of psychiatric condition will be minimized and patient will be protected from self harm  Description: INTERVENTIONS:  1. Assess impact of patient's symptoms on level of functioning, self care needs and offer support as indicated  2. Assess patient/family knowledge of depression, impact on illness and need for teaching  3. Provide emotional support, presence and reassurance  4. Assess for possible suicidal thoughts or ideation. If patient expresses suicidal thoughts or statements do not leave alone, initiate Suicide Precautions, move to a room close to the nursing station and obtain sitter  5. Initiate consults as appropriate with Mental Health Professional, Spiritual Care, Psychosocial CNS, and consider a recommendation to the LIP for a

## 2024-04-06 NOTE — BH NOTE
Lying in bed with eyes open. Declined morning meal. Requested his light be turned off so he could rest.

## 2024-04-07 PROCEDURE — 99232 SBSQ HOSP IP/OBS MODERATE 35: CPT | Performed by: NURSE PRACTITIONER

## 2024-04-07 PROCEDURE — 1240000000 HC EMOTIONAL WELLNESS R&B

## 2024-04-07 PROCEDURE — 6370000000 HC RX 637 (ALT 250 FOR IP): Performed by: PSYCHIATRY & NEUROLOGY

## 2024-04-07 RX ADMIN — IBUPROFEN 400 MG: 400 TABLET, FILM COATED ORAL at 00:21

## 2024-04-07 RX ADMIN — MAGNESIUM HYDROXIDE 30 ML: 1200 LIQUID ORAL at 23:14

## 2024-04-07 ASSESSMENT — PAIN SCALES - GENERAL: PAINLEVEL_OUTOF10: 10

## 2024-04-07 ASSESSMENT — PAIN DESCRIPTION - LOCATION: LOCATION: BACK

## 2024-04-07 NOTE — PROGRESS NOTES
Department of Psychiatry  AttendingProgress Note  Chief Complaint: depression    Patient's chart was reviewed and collaborated with  about the treatment plan.    SUBJECTIVE:        Pt is pleasant and is resting in bed. Says he feels good and denies concerns with medication. Denies SI. Reports sleep and appetite are good.    Patient is feeling better. Suicidal ideation:  denies suicidal ideation.  Patient does not have medication side effects.    ROS: Patient has new complaints: no  Sleeping adequately:  Yes   Appetite adequate: Yes  Attending groups: No:   Visitors:No    OBJECTIVE    Physical  VITALS:  /85   Pulse 96   Temp 97.3 °F (36.3 °C) (Oral)   Resp 18   Ht 1.88 m (6' 2\")   Wt 127 kg (280 lb)   SpO2 96%   BMI 35.95 kg/m²     Mental Status Examination:  Patients appearance was hospital attire and lying in bed. Thoughts are Goal directed. Homicidal ideations none.  No abnormal movements, tics or mannerisms.  Memory intact Aims 0. Concentration Fair.   Alert and oriented X 4. Insight and Judgement impaired insight. Patient was cooperative. Patient gait not assessed, in bed. Mood euthymic, affect normal affect Hallucinations Absent, suicidal ideations no specific plan to harm self Speech normal volume    Data  Labs:   Admission on 04/05/2024   Component Date Value Ref Range Status    Ventricular Rate 04/06/2024 96  BPM Final    Atrial Rate 04/06/2024 96  BPM Final    P-R Interval 04/06/2024 142  ms Final    QRS Duration 04/06/2024 84  ms Final    Q-T Interval 04/06/2024 350  ms Final    QTc Calculation (Bazett) 04/06/2024 442  ms Final    P Axis 04/06/2024 32  degrees Final    R Axis 04/06/2024 7  degrees Final    T Axis 04/06/2024 26  degrees Final    Diagnosis 04/06/2024 Normal sinus rhythmNormal ECGWhen compared with ECG of 09-JUL-2023 00:25,No significant changeConfirmed by BARBARA Armijo ([id number]) on 4/6/2024 4:19:52 PM   Final    WBC 04/06/2024 9.0  4.0 - 11.0 K/uL Final    RBC

## 2024-04-07 NOTE — PLAN OF CARE
Patient alert and oriented x 3. Patient visible and social on the milieu watching T.V. Patient denies SI/HI/A/V/H. Patient doesn't have HS medications scheduled. Patient denies self harm. No c/o's voiced @ present.

## 2024-04-07 NOTE — PLAN OF CARE
Problem: Anxiety  Goal: Will report anxiety at manageable levels  Description: INTERVENTIONS:  1. Administer medication as ordered  2. Teach and rehearse alternative coping skills  3. Provide emotional support with 1:1 interaction with staff  Outcome: Progressing     Problem: Coping  Goal: Pt/Family able to verbalize concerns and demonstrate effective coping strategies  Description: INTERVENTIONS:  1. Assist patient/family to identify coping skills, available support systems and cultural and spiritual values  2. Provide emotional support, including active listening and acknowledgement of concerns of patient and caregivers  3. Reduce environmental stimuli, as able  4. Instruct patient/family in relaxation techniques, as appropriate  5. Assess for spiritual pain/suffering and initiate Spiritual Care, Psychosocial Clinical Specialist consults as needed  Outcome: Progressing     Problem: Depression/Self Harm  Goal: Effect of psychiatric condition will be minimized and patient will be protected from self harm  Description: INTERVENTIONS:  1. Assess impact of patient's symptoms on level of functioning, self care needs and offer support as indicated  2. Assess patient/family knowledge of depression, impact on illness and need for teaching  3. Provide emotional support, presence and reassurance  4. Assess for possible suicidal thoughts or ideation. If patient expresses suicidal thoughts or statements do not leave alone, initiate Suicide Precautions, move to a room close to the nursing station and obtain sitter  5. Initiate consults as appropriate with Mental Health Professional, Spiritual Care, Psychosocial CNS, and consider a recommendation to the LIP for a Psychiatric Consultation  Outcome: Progressing     Problem: Pain  Goal: Verbalizes/displays adequate comfort level or baseline comfort level  Outcome: Progressing   Patient isolative to his room. Patient denies SI/HI, AVH and pain. No concerns voiced at this time.

## 2024-04-08 VITALS
SYSTOLIC BLOOD PRESSURE: 127 MMHG | BODY MASS INDEX: 35.94 KG/M2 | TEMPERATURE: 97.7 F | OXYGEN SATURATION: 96 % | RESPIRATION RATE: 18 BRPM | HEART RATE: 110 BPM | DIASTOLIC BLOOD PRESSURE: 82 MMHG | WEIGHT: 280 LBS | HEIGHT: 74 IN

## 2024-04-08 PROBLEM — F15.10 METHAMPHETAMINE ABUSE (HCC): Status: ACTIVE | Noted: 2024-04-08

## 2024-04-08 PROBLEM — F12.10 CANNABIS ABUSE: Status: ACTIVE | Noted: 2024-04-08

## 2024-04-08 PROCEDURE — 5130000000 HC BRIDGE APPOINTMENT

## 2024-04-08 PROCEDURE — 99239 HOSP IP/OBS DSCHRG MGMT >30: CPT | Performed by: PSYCHIATRY & NEUROLOGY

## 2024-04-08 NOTE — CARE COORDINATION
[facility]  Treatment Team Note  Day 3    Review Date & Time: 4/8/24  6387    Patient was not in treatment team      Status EXAM:   Mental Status and Behavioral Exam  Normal: No  Level of Assistance: Independent/Self  Facial Expression: Elevated  Affect: Blunt  Level of Consciousness: Alert  Frequency of Checks: 4 times per hour, close  Mood:Normal: No  Mood: Elated  Motor Activity:Normal: Yes  Motor Activity: Decreased  Eye Contact: Good  Observed Behavior: Cooperative  Sexual Misconduct History: Past - no  Preception: [place] to person, [place] to time, [place] to place  Attention:Normal: No  Attention: Distractible  Thought Processes: Circumstantial  Thought Content:Normal: No  Thought Content: Poverty of content  Depression Symptoms: No problems reported or observed.  Anxiety Symptoms: Generalized  Jacque Symptoms: No problems reported or observed.  Hallucinations: None  Delusions: No  Delusions: Other (comment)  Memory:Normal: No  Memory: Poor recent  Insight and Judgment: No  Insight and Judgment: Poor judgment, Poor insight      Suicide Risk CSSR-S:  1) Within the past month, have you wished you were dead or wished you could go to sleep and not wake up? : Yes  2) Have you actually had any thoughts of killing yourself? : Yes  3) Have you been thinking about how you might kill yourself? : Yes  5) Have you started to work out or worked out the details of how to kill yourself? Do you intend to carry out this plan? : No  6) Have you ever done anything, started to do anything, or prepared to do anything to end your life?: Yes      PLAN/TREATMENT RECOMMENDATIONS UPDATE: Patient will take medication as prescribed, eat 75% of meals, attend groups, participate in milieu activities, participate in treatment team and care planning for discharge and follow up.      Skylar Vieyra RN

## 2024-04-08 NOTE — PLAN OF CARE
Patient visible on unit. Refused Nicotine patch. Cooperative with staff. Denies SI/HI/AVH. Looking forward to discharge later today. Safety plan completed

## 2024-04-08 NOTE — PLAN OF CARE
Patient was out with patient group, early  & was social, with rapid pressured, which became loud at times. Patient was verbal in 1:1 & reported  that he hopes for discharge tomorrow. 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 Later patient stated being homeless & has been in several different homeless shelters.Patient reported having a large amount of emesis out on the end of the previous shift. No complaint of nausea or emesis on this shift.  Patient was offered a trazodone for sleep & patient reported, that it causes him to have restless leg syndrome. Sera Bloom R.N

## 2024-04-08 NOTE — TRANSITION OF CARE
[phone]  [address] [address] (women)  [facility]: [facility] (pregnant &  treatment) - [phone]  [address] [place], [place] [address]  [place] - [phone] (must [facility];  services)   [address] [place], [place] [address]  [facility] - [phone] (Pregnant and  treatment)   [address] [place], [place] [address]    Outpatient Treatment Services:  [facility] - [phone]   [address] [place], [place] [address]  [facility] - [phone]   [address] [place], [place] [address]  [facility]- [phone]  [address] [place], [place] [address]  [facility] ([facility]) - [phone]  [address] [place], [place] [address] (4th floor West)  [facility] ([facility]) - [phone]  [address] [facility] [address] [place], [place] [address]  [facility] ([facility]) - [phone]  [address] [place], [place] [address]  [facility] - [phone]   [address] [place] [address] [address] [place] [id number]  [employer] ([employer])  - [phone]  [address] [facility] [address] [place], [place] [address]  [facility] - [phone]  [address] [place], [place] [address]  [facility] - [phone]  [address] [facility] [address] [place], [place] [address]  [facility] - [phone]   [address] [place] [address]  [facility] - [phone]   [address] [place], [place] [address]  [facility] - [phone]   [address] [facility] [address] [place], [place] [address]  [facility] - [phone]   [address] Substance Abuse - [phone] [address] [facility] [address] [facility]

## 2024-04-08 NOTE — GROUP NOTE
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    Notes:  Patient stated he completed his goal    Status After Intervention:  Unchanged    Participation Level: Interactive and Monopolizing    Participation Quality: Intrusive      Speech:  pressured and loud      Thought Process/Content: Flight of ideas      Affective Functioning: Exaggerated      Mood: anxious and elevated      Level of consciousness:  Alert      Response to Learning: Progressing to goal      Endings: None Reported    Modes of Intervention: Education, Support, and Socialization      Discipline Responsible: Registered Nurse      Signature:  Sera Bloom RN

## 2024-04-08 NOTE — BH NOTE
[facility]  Discharge Note    Pt discharged with followings belongings:   Dental Appliances: None  Vision - Corrective Lenses: None  Hearing Aid: None  Jewelry: None  Body Piercings Removed: N/A  Clothing: Footwear, Pants, Shirt, Socks, Other (Comment)  Other Valuables: Other (Comment) (none)   Valuables sent home withyes or returned to patient. Patient educated on aftercare instructions: yes Patient verbalize understanding of AVS:  yes.    Status EXAM upon discharge:  Mental Status and Behavioral Exam  Normal: No  Level of Assistance: Independent/Self  Facial Expression: Elevated  Affect: Blunt  Level of Consciousness: Alert  Frequency of Checks: 4 times per hour, close  Mood:Normal: No  Mood: Elated  Motor Activity:Normal: Yes  Motor Activity: Decreased  Eye Contact: Good  Observed Behavior: Cooperative  Sexual Misconduct History: Past - no  Preception: [place] to person, [place] to time, [place] to place  Attention:Normal: No  Attention: Distractible  Thought Processes: Circumstantial  Thought Content:Normal: No  Thought Content: Poverty of content  Depression Symptoms: No problems reported or observed.  Anxiety Symptoms: Generalized  Jacque Symptoms: No problems reported or observed.  Hallucinations: None  Delusions: No  Delusions: Other (comment)  Memory:Normal: No  Memory: Poor recent  Insight and Judgment: No  Insight and Judgment: Poor judgment, Poor insight    Tobacco Screening:  Practical Counseling, on admission, brijesh X, if applicable and completed (first 3 are required if patient doesn't refuse)refused:            ( ) Recognizing danger situations (included triggers and roadblocks)                    ( ) Coping skills (new ways to manage stress,relaxation techniques, changing routine, distraction)                                                           ( ) Basic information about quitting (benefits of quitting, techniques in how to quit, available resources  ( ) Referral for counseling

## 2024-04-08 NOTE — BH NOTE
Bridge Appointment completed: Reviewed Discharge Instructions with patient.    Patient verbalizes understanding and agreement with the discharge plan using the teachback method.     Referral for Outpatient Tobacco Cessation Counseling, upon discharge (brijesh X if applicable and completed):  nonsmoker  ( )  Hospital staff assisted patient to call Quit Line or faxed referral                                   during hospitalization                  ( )  Recognizing danger situations (included triggers and roadblocks), if not completed on admission                    ( )  Coping skills (new ways to manage stress, exercise, relaxation techniques, changing routine, distraction), if not completed on admission                                                           ( )  Basic information about quitting (benefits of quitting, techniques in how to quit, available resources, if not completed on admission  ( ) Referral for [facility]ed to [facility]   ( ) Patient refused referral  ( ) Patient refused counseling  ( ) Patient refused smoking cessation medication upon discharge    Vaccinations (brijesh X if applicable and completed):  ( ) Patient states already received influenza vaccine elsewhere  ( ) Patient received influenza vaccine during this hospitalization  (X ) Patient refused influenza vaccine at this time  ( ) Not offered

## 2024-04-08 NOTE — DISCHARGE SUMMARY
MCHC 04/06/2024 33.4  31.0 - 36.0 g/dL Final    RDW 04/06/2024 14.0  12.4 - 15.4 % Final    Platelets 04/06/2024 249  135 - 450 K/uL Final    MPV 04/06/2024 7.4  5.0 - 10.5 fL Final    Neutrophils % 04/06/2024 78.9  % Final    Lymphocytes % 04/06/2024 13.2  % Final    Monocytes % 04/06/2024 5.6  % Final    Eosinophils % 04/06/2024 2.0  % Final    Basophils % 04/06/2024 0.3  % Final    Neutrophils Absolute 04/06/2024 7.1  1.7 - 7.7 K/uL Final    Lymphocytes Absolute 04/06/2024 1.2  1.0 - 5.1 K/uL Final    Monocytes Absolute 04/06/2024 0.5  0.0 - 1.3 K/uL Final    Eosinophils Absolute 04/06/2024 0.2  0.0 - 0.6 K/uL Final    Basophils Absolute 04/06/2024 0.0  0.0 - 0.2 K/uL Final    Sodium 04/06/2024 139  136 - 145 mmol/L Final    Potassium reflex Magnesium 04/06/2024 4.2  3.5 - 5.1 mmol/L Final    Chloride 04/06/2024 103  99 - 110 mmol/L Final    CO2 04/06/2024 26  21 - 32 mmol/L Final    Anion Gap 04/06/2024 10  3 - 16 Final    Glucose 04/06/2024 112 (H)  70 - 99 mg/dL Final    BUN 04/06/2024 13  7 - 20 mg/dL Final    Creatinine 04/06/2024 0.7 (L)  0.9 - 1.3 mg/dL Final    Est, Glom Filt Rate 04/06/2024 >90  >60 Final    Comment: Pediatric calculator link  https://www.kidney.org/professionals/kdoqi/gfr_calculatorped  Effective Oct 3, 2022  These results are not intended for use in patients  <18 years of age.  eGFR results are calculated without  a race factor using the 2021 CKD-EPI equation.  Careful  clinical correlation is recommended, particularly when  comparing to results calculated using previous equations.  The CKD-EPI equation is less accurate in patients with  extremes of muscle mass, extra-renal metabolism of  creatinine, excessive creatinine ingestion, or following  therapy that affects renal tubular secretion.      Calcium 04/06/2024 8.8  8.3 - 10.6 mg/dL Final        Mental Status Exam at Discharge:  Level of consciousness:  awake  Appearance:  well-appearing, in chair, good grooming and good hygiene

## 2024-04-09 ENCOUNTER — FOLLOWUP TELEPHONE ENCOUNTER (OUTPATIENT)
Dept: PSYCHIATRY | Age: 24
End: 2024-04-09

## 2024-06-29 ENCOUNTER — HOSPITAL ENCOUNTER (EMERGENCY)
Facility: HOSPITAL | Age: 24
Discharge: HOME | End: 2024-06-29
Attending: EMERGENCY MEDICINE
Payer: MEDICAID

## 2024-06-29 VITALS
DIASTOLIC BLOOD PRESSURE: 80 MMHG | TEMPERATURE: 98.3 F | SYSTOLIC BLOOD PRESSURE: 128 MMHG | WEIGHT: 260 LBS | OXYGEN SATURATION: 98 % | RESPIRATION RATE: 18 BRPM | BODY MASS INDEX: 33.37 KG/M2 | HEIGHT: 74 IN | HEART RATE: 80 BPM

## 2024-06-29 DIAGNOSIS — R45.851 SUICIDAL IDEATION: Primary | ICD-10-CM

## 2024-06-29 LAB
ALBUMIN SERPL-MCNC: 4.2 G/DL (ref 3.5–5.2)
ALBUMIN/GLOB SERPL: 1.7 G/DL
ALP SERPL-CCNC: 111 U/L (ref 39–117)
ALT SERPL W P-5'-P-CCNC: 64 U/L (ref 1–41)
AMPHET+METHAMPHET UR QL: NEGATIVE
AMPHETAMINES UR QL: NEGATIVE
ANION GAP SERPL CALCULATED.3IONS-SCNC: 10.2 MMOL/L (ref 5–15)
APAP SERPL-MCNC: <5 MCG/ML (ref 0–30)
AST SERPL-CCNC: 27 U/L (ref 1–40)
BARBITURATES UR QL SCN: NEGATIVE
BASOPHILS # BLD AUTO: 0.02 10*3/MM3 (ref 0–0.2)
BASOPHILS NFR BLD AUTO: 0.2 % (ref 0–1.5)
BENZODIAZ UR QL SCN: NEGATIVE
BILIRUB SERPL-MCNC: 0.2 MG/DL (ref 0–1.2)
BILIRUB UR QL STRIP: NEGATIVE
BUN SERPL-MCNC: 12 MG/DL (ref 6–20)
BUN/CREAT SERPL: 13.6 (ref 7–25)
BUPRENORPHINE SERPL-MCNC: NEGATIVE NG/ML
CALCIUM SPEC-SCNC: 9.3 MG/DL (ref 8.6–10.5)
CANNABINOIDS SERPL QL: NEGATIVE
CHLORIDE SERPL-SCNC: 106 MMOL/L (ref 98–107)
CLARITY UR: CLEAR
CO2 SERPL-SCNC: 26.8 MMOL/L (ref 22–29)
COCAINE UR QL: NEGATIVE
COLOR UR: YELLOW
CREAT SERPL-MCNC: 0.88 MG/DL (ref 0.76–1.27)
DEPRECATED RDW RBC AUTO: 36 FL (ref 37–54)
EGFRCR SERPLBLD CKD-EPI 2021: 123.1 ML/MIN/1.73
EOSINOPHIL # BLD AUTO: 0.2 10*3/MM3 (ref 0–0.4)
EOSINOPHIL NFR BLD AUTO: 2.4 % (ref 0.3–6.2)
ERYTHROCYTE [DISTWIDTH] IN BLOOD BY AUTOMATED COUNT: 12.9 % (ref 12.3–15.4)
ETHANOL BLD-MCNC: <10 MG/DL (ref 0–10)
ETHANOL UR QL: <0.01 %
FENTANYL UR-MCNC: NEGATIVE NG/ML
GLOBULIN UR ELPH-MCNC: 2.5 GM/DL
GLUCOSE SERPL-MCNC: 100 MG/DL (ref 65–99)
GLUCOSE UR STRIP-MCNC: NEGATIVE MG/DL
HCT VFR BLD AUTO: 43.3 % (ref 37.5–51)
HGB BLD-MCNC: 14.5 G/DL (ref 13–17.7)
HGB UR QL STRIP.AUTO: NEGATIVE
HOLD SPECIMEN: NORMAL
HOLD SPECIMEN: NORMAL
IMM GRANULOCYTES # BLD AUTO: 0.01 10*3/MM3 (ref 0–0.05)
IMM GRANULOCYTES NFR BLD AUTO: 0.1 % (ref 0–0.5)
KETONES UR QL STRIP: NEGATIVE
LEUKOCYTE ESTERASE UR QL STRIP.AUTO: NEGATIVE
LYMPHOCYTES # BLD AUTO: 2.74 10*3/MM3 (ref 0.7–3.1)
LYMPHOCYTES NFR BLD AUTO: 32.5 % (ref 19.6–45.3)
MAGNESIUM SERPL-MCNC: 2.1 MG/DL (ref 1.6–2.6)
MCH RBC QN AUTO: 26.6 PG (ref 26.6–33)
MCHC RBC AUTO-ENTMCNC: 33.5 G/DL (ref 31.5–35.7)
MCV RBC AUTO: 79.3 FL (ref 79–97)
METHADONE UR QL SCN: NEGATIVE
MONOCYTES # BLD AUTO: 0.52 10*3/MM3 (ref 0.1–0.9)
MONOCYTES NFR BLD AUTO: 6.2 % (ref 5–12)
NEUTROPHILS NFR BLD AUTO: 4.93 10*3/MM3 (ref 1.7–7)
NEUTROPHILS NFR BLD AUTO: 58.6 % (ref 42.7–76)
NITRITE UR QL STRIP: NEGATIVE
NRBC BLD AUTO-RTO: 0 /100 WBC (ref 0–0.2)
OPIATES UR QL: NEGATIVE
OXYCODONE UR QL SCN: NEGATIVE
PCP UR QL SCN: NEGATIVE
PH UR STRIP.AUTO: 6 [PH] (ref 5–8)
PLATELET # BLD AUTO: 318 10*3/MM3 (ref 140–450)
PMV BLD AUTO: 9.2 FL (ref 6–12)
POTASSIUM SERPL-SCNC: 4.2 MMOL/L (ref 3.5–5.2)
PROT SERPL-MCNC: 6.7 G/DL (ref 6–8.5)
PROT UR QL STRIP: ABNORMAL
RBC # BLD AUTO: 5.46 10*6/MM3 (ref 4.14–5.8)
SALICYLATES SERPL-MCNC: <0.3 MG/DL
SODIUM SERPL-SCNC: 143 MMOL/L (ref 136–145)
SP GR UR STRIP: >=1.03 (ref 1–1.03)
TRICYCLICS UR QL SCN: NEGATIVE
TSH SERPL DL<=0.05 MIU/L-ACNC: 1.9 UIU/ML (ref 0.27–4.2)
UROBILINOGEN UR QL STRIP: ABNORMAL
WBC NRBC COR # BLD AUTO: 8.42 10*3/MM3 (ref 3.4–10.8)
WHOLE BLOOD HOLD COAG: NORMAL
WHOLE BLOOD HOLD SPECIMEN: NORMAL

## 2024-06-29 PROCEDURE — 99283 EMERGENCY DEPT VISIT LOW MDM: CPT

## 2024-06-29 PROCEDURE — 93005 ELECTROCARDIOGRAM TRACING: CPT | Performed by: EMERGENCY MEDICINE

## 2024-06-29 PROCEDURE — 80179 DRUG ASSAY SALICYLATE: CPT | Performed by: EMERGENCY MEDICINE

## 2024-06-29 PROCEDURE — 80053 COMPREHEN METABOLIC PANEL: CPT | Performed by: EMERGENCY MEDICINE

## 2024-06-29 PROCEDURE — 80143 DRUG ASSAY ACETAMINOPHEN: CPT | Performed by: EMERGENCY MEDICINE

## 2024-06-29 PROCEDURE — 85025 COMPLETE CBC W/AUTO DIFF WBC: CPT | Performed by: EMERGENCY MEDICINE

## 2024-06-29 PROCEDURE — 36415 COLL VENOUS BLD VENIPUNCTURE: CPT

## 2024-06-29 PROCEDURE — 83735 ASSAY OF MAGNESIUM: CPT | Performed by: EMERGENCY MEDICINE

## 2024-06-29 PROCEDURE — 82077 ASSAY SPEC XCP UR&BREATH IA: CPT | Performed by: EMERGENCY MEDICINE

## 2024-06-29 PROCEDURE — 84443 ASSAY THYROID STIM HORMONE: CPT | Performed by: EMERGENCY MEDICINE

## 2024-06-29 PROCEDURE — 80307 DRUG TEST PRSMV CHEM ANLYZR: CPT | Performed by: EMERGENCY MEDICINE

## 2024-06-29 PROCEDURE — 81003 URINALYSIS AUTO W/O SCOPE: CPT | Performed by: EMERGENCY MEDICINE

## 2024-06-29 NOTE — ED PROVIDER NOTES
"Pt Name: Linwood Patel  MRN: [id number]  : 2000  Date of Encounter: 2024    PCP: Provider, No Known      Subjective    History of Present Illness:    Chief Complaint: Suicidal ideation    History of Present Illness: Linwood Patel is a 24 y.o. male who presents to the ER complaining of suicidal ideation that has been ongoing for the last hour to 2 hours prior to arrival.  Patient states he has no plan but states he has had history of intentional overdose of narcotics and attempts previously        Nurses Notes reviewed and agree, including vitals, allergies, social history and prior medical history.       Allergies:    Capsicum annuum extract & derivative (bell pepper) [capsicum], Fish-derived products, Onion, Peanut-containing drug products, and Tomato    Past Medical History:   Diagnosis Date    Anxiety     Bipolar 1 disorder     Depression     PTSD (post-traumatic stress disorder)     Substance abuse        Past Surgical History:   Procedure Laterality Date    KNEE ACL RECONSTRUCTION Right     KNEE ACL RECONSTRUCTION Right 2016       Social History     Socioeconomic History    Marital status: Single    Number of children: 1    Years of education: 12th grade    Highest education level: 12th grade   Tobacco Use    Smoking status: Every Day     Current packs/day: 0.50     Types: Cigarettes    Smokeless tobacco: Current     Types: Snuff   Vaping Use    Vaping status: Former   Substance and Sexual Activity    Alcohol use: Yes     Comment: Pt reported upon admission that he drank a pint of Vodka at 6pm before coming to ED. Lab etoh WDL.    Drug use: Yes     Types: Methamphetamines, Cocaine(coke), \"Crack\" cocaine, Heroin    Sexual activity: Yes     Partners: Female       Family History   Problem Relation Age of Onset    Depression Mother     Drug abuse Mother     Alcohol abuse Mother     Diabetes Father     Hypertension Father     Drug abuse Father     Alcohol abuse Father     Drug abuse Brother     Drug abuse " Brother     Suicide Attempts Neg Hx        REVIEW OF SYSTEMS:     All systems reviewed and not pertinent unless noted.    Review of Systems   Psychiatric/Behavioral:  Positive for suicidal ideas.    All other systems reviewed and are negative.      Objective    Physical Exam  Vitals and nursing note reviewed.   Constitutional:       Appearance: Normal appearance.   HENT:      Head: Normocephalic and atraumatic.   Eyes:      Extraocular Movements: Extraocular movements intact.      Pupils: Pupils are equal, round, and reactive to light.   Cardiovascular:      Rate and Rhythm: Normal rate and regular rhythm.      Pulses: Normal pulses.      Heart sounds: Normal heart sounds.   Pulmonary:      Effort: Pulmonary effort is normal.      Breath sounds: Normal breath sounds.   Abdominal:      General: Bowel sounds are normal.      Palpations: Abdomen is soft.   Musculoskeletal:         General: Normal range of motion.      Cervical back: Normal range of motion and neck supple.   Skin:     General: Skin is warm and dry.      Capillary Refill: Capillary refill takes less than 2 seconds.   Neurological:      Mental Status: He is alert.      GCS: GCS eye subscore is 4. GCS verbal subscore is 5. GCS motor subscore is 6.      Sensory: Sensation is intact.      Motor: Motor function is intact.   Psychiatric:         Attention and Perception: Attention and perception normal.         Mood and Affect: Mood and affect normal.         Speech: Speech normal.         Thought Content: Thought content includes suicidal ideation. Thought content does not include suicidal plan.               Procedures    ED Course:    ED Course as of 06/30/24 0856   Sat Jun 29, 2024   1610 Patient presenting with suicidal ideation, no active plan.  Patient has no active attempts.  Patient's case to be discussed with inpatient psychiatric services, patient may not require inpatient services.  If patient to be discharged will need safety plan and further  outpatient management including counseling services. [CR]      ED Course User Index  [CR] Jorden Eisenberg, DO       LAB Results:    Lab Results (last 24 hours)       Procedure Component Value Units Date/Time    Urinalysis With Microscopic If Indicated (No Culture) - Urine, Clean Catch [[id number]]  (Abnormal) Collected: 06/29/24 1555    Specimen: Urine, Clean Catch Updated: 06/29/24 1601     Color, UA Yellow     Appearance, UA Clear     pH, UA 6.0     Specific Gravity, UA >=1.030     Glucose, UA Negative     Ketones, UA Negative     Bilirubin, UA Negative     Blood, UA Negative     Protein, UA Trace     Leuk Esterase, UA Negative     Nitrite, UA Negative     Urobilinogen, UA 0.2 E.U./dL    Narrative:      Urine microscopic not indicated.    Urine Drug Screen - Urine, Clean Catch [[id number]]  (Normal) Collected: 06/29/24 1555    Specimen: Urine, Clean Catch Updated: 06/29/24 1613     THC, Screen, Urine Negative     Phencyclidine (PCP), Urine Negative     Cocaine Screen, Urine Negative     Methamphetamine, Ur Negative     Opiate Screen Negative     Amphetamine Screen, Urine Negative     Benzodiazepine Screen, Urine Negative     Tricyclic Antidepressants Screen Negative     Methadone Screen, Urine Negative     Barbiturates Screen, Urine Negative     Oxycodone Screen, Urine Negative     Buprenorphine, Screen, Urine Negative    Narrative:      Cutoff For Drugs Screened:    Amphetamines               500 ng/ml  Barbiturates               200 ng/ml  Benzodiazepines            150 ng/ml  Cocaine                    150 ng/ml  Methadone                  200 ng/ml  Opiates                    100 ng/ml  Phencyclidine               25 ng/ml  THC                         50 ng/ml  Methamphetamine            500 ng/ml  Tricyclic Antidepressants  300 ng/ml  Oxycodone                  100 ng/ml  Buprenorphine               10 ng/ml    The normal value for all drugs tested is negative. This report includes unconfirmed  screening results, with the cutoff values listed, to be used for medical treatment purposes only.  Unconfirmed results must not be used for non-medical purposes such as employment or legal testing.  Clinical consideration should be applied to any drug of abuse test, particularly when unconfirmed results are used.      Fentanyl, Urine - Urine, Clean Catch [[id number]]  (Normal) Collected: 06/29/24 1555    Specimen: Urine, Clean Catch Updated: 06/29/24 1619     Fentanyl, Urine Negative    Narrative:      Negative Threshold:      Fentanyl 5 ng/mL     The normal value for the drug tested is negative. This report includes final unconfirmed screening results to be used for medical treatment purposes only. Unconfirmed results must not be used for non-medical purposes such as employment or legal testing. Clinical consideration should be applied to any drug of abuse test, particularly when unconfirmed results are used.           CBC & Differential [[id number]]  (Abnormal) Collected: 06/29/24 1616    Specimen: Blood Updated: 06/29/24 1630    Narrative:      The following orders were created for panel order CBC & Differential.  Procedure                               Abnormality         Status                     ---------                               -----------         ------                     CBC Auto Differential[[id number]]        Abnormal            Final result                 Please view results for these tests on the individual orders.    Comprehensive Metabolic Panel [[id number]]  (Abnormal) Collected: 06/29/24 1616    Specimen: Blood Updated: 06/29/24 1655     Glucose 100 mg/dL      BUN 12 mg/dL      Creatinine 0.88 mg/dL      Sodium 143 mmol/L      Potassium 4.2 mmol/L      Chloride 106 mmol/L      CO2 26.8 mmol/L      Calcium 9.3 mg/dL      Total Protein 6.7 g/dL      Albumin 4.2 g/dL      ALT (SGPT) 64 U/L      AST (SGOT) 27 U/L      Alkaline Phosphatase 111 U/L      Total Bilirubin 0.2 mg/dL      Globulin 2.5  gm/dL      A/G Ratio 1.7 g/dL      BUN/Creatinine Ratio 13.6     Anion Gap 10.2 mmol/L      eGFR 123.1 mL/min/1.73     Narrative:      GFR Normal >60  Chronic Kidney Disease <60  Kidney Failure <15      Magnesium [[id number]]  (Normal) Collected: 06/29/24 1616    Specimen: Blood Updated: 06/29/24 1655     Magnesium 2.1 mg/dL     Acetaminophen Level [[id number]]  (Normal) Collected: 06/29/24 1616    Specimen: Blood Updated: 06/29/24 1655     Acetaminophen <5.0 mcg/mL     Narrative:      Toxic = Greater than 150 mcg/mL    Ethanol [[id number]] Collected: 06/29/24 1616    Specimen: Blood Updated: 06/29/24 1655     Ethanol <10 mg/dL      Ethanol % <0.010 %     Narrative:      This result is for medical use only and should not be used for forensic purposes.    Salicylate Level [[id number]]  (Normal) Collected: 06/29/24 1616    Specimen: Blood Updated: 06/29/24 1655     Salicylate <0.3 mg/dL     TSH [[id number]]  (Normal) Collected: 06/29/24 1616    Specimen: Blood Updated: 06/29/24 1702     TSH 1.900 uIU/mL     CBC Auto Differential [[id number]]  (Abnormal) Collected: 06/29/24 1616    Specimen: Blood Updated: 06/29/24 1630     WBC 8.42 10*3/mm3      RBC 5.46 10*6/mm3      Hemoglobin 14.5 g/dL      Hematocrit 43.3 %      MCV 79.3 fL      MCH 26.6 pg      MCHC 33.5 g/dL      RDW 12.9 %      RDW-SD 36.0 fl      MPV 9.2 fL      Platelets 318 10*3/mm3      Neutrophil % 58.6 %      Lymphocyte % 32.5 %      Monocyte % 6.2 %      Eosinophil % 2.4 %      Basophil % 0.2 %      Immature Grans % 0.1 %      Neutrophils, Absolute 4.93 10*3/mm3      Lymphocytes, Absolute 2.74 10*3/mm3      Monocytes, Absolute 0.52 10*3/mm3      Eosinophils, Absolute 0.20 10*3/mm3      Basophils, Absolute 0.02 10*3/mm3      Immature Grans, Absolute 0.01 10*3/mm3      nRBC 0.0 /100 WBC              If labs were ordered, I have independently reviewed the results and considered them in the diagnosis and treatment plan for the patient    RADIOLOGY    No radiology  results from the last 24 hrs     If I have ordered, I have independently reviewed the above noted radiographic studies.  Please see the radiologist dictation for the official interpretation    Medications given to patient in the ER    Medications - No data to display              Shared Decision Making: After my consideration the clinical presentation and laboratory/radiology studies obtained, I discussed the findings with the patient/patient representative who is in agreement with the treatment plan and final disposition. Risks and benefits of discharge and/or observation admission were discussed.  Final disposition of the patient will be discharged home and request patient follow-up with primary care provider and behavioral health as previously arranged    Medications prescribed: No new home medications were prescribed during this ER visit       Medical Decision Making  Linwood Patel is a 24 y.o. male who presents to the ER complaining of suicidal ideation that has been ongoing for the last hour to 2 hours prior to arrival.  Patient states he has no plan but states he has had history of intentional overdose of narcotics and attempts previously    DDX: includes but is not limited to: Suicidal ideation    Problems Addressed:  Suicidal ideation: complicated acute illness or injury    Amount and/or Complexity of Data Reviewed  Labs: ordered. Decision-making details documented in ED Course.     Details: I have personally reviewed and documented all results  ECG/medicine tests: ordered.  Discussion of management or test interpretation with external provider(s): Discussed assessment, treatment and plan with ER attending, behavioral health.  Behavioral health team has discussed with patient and do not feel at this time an inpatient hospitalization is warranted.  Behavioral health team member has initiated a safety plan and has given patient a follow-up    Risk  Risk Details: I have discussed with patient the finding of the  test preformed today. Patient has been diagnosed with suicidal ideation and will be discharged home.  Patient requested to follow-up with primary care provider, behavioral health within the next 7 days for reevaluation. Strict return precautions have been given and patient verbalizes understanding          Final diagnoses:   Suicidal ideation         Please note that portions of this document were completed using voice recognition dictation software.       Pb Fraser, INDRA  06/29/24 1707       Pb Fraser APRN  06/30/24 0882
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
## 2024-11-01 ENCOUNTER — HOSPITAL ENCOUNTER (EMERGENCY)
Facility: HOSPITAL | Age: 24
Discharge: PSYCHIATRIC OR SUBSTANCE USE FACILITY | DRG: 881 | End: 2024-11-02
Attending: EMERGENCY MEDICINE | Admitting: EMERGENCY MEDICINE
Payer: MEDICAID

## 2024-11-01 VITALS
OXYGEN SATURATION: 100 % | HEIGHT: 74 IN | DIASTOLIC BLOOD PRESSURE: 81 MMHG | SYSTOLIC BLOOD PRESSURE: 139 MMHG | RESPIRATION RATE: 18 BRPM | WEIGHT: 245 LBS | BODY MASS INDEX: 31.44 KG/M2 | HEART RATE: 98 BPM | TEMPERATURE: 98.2 F

## 2024-11-01 DIAGNOSIS — F32.A DEPRESSION WITH SUICIDAL IDEATION: Primary | ICD-10-CM

## 2024-11-01 DIAGNOSIS — R45.851 DEPRESSION WITH SUICIDAL IDEATION: Primary | ICD-10-CM

## 2024-11-01 LAB
ALBUMIN SERPL-MCNC: 4 G/DL (ref 3.5–5.2)
ALBUMIN/GLOB SERPL: 1.6 G/DL
ALP SERPL-CCNC: 85 U/L (ref 39–117)
ALT SERPL W P-5'-P-CCNC: 26 U/L (ref 1–41)
AMPHET+METHAMPHET UR QL: NEGATIVE
AMPHETAMINES UR QL: POSITIVE
ANION GAP SERPL CALCULATED.3IONS-SCNC: 9.3 MMOL/L (ref 5–15)
APAP SERPL-MCNC: <5 MCG/ML (ref 0–30)
AST SERPL-CCNC: 17 U/L (ref 1–40)
BARBITURATES UR QL SCN: NEGATIVE
BASOPHILS # BLD AUTO: 0.01 10*3/MM3 (ref 0–0.2)
BASOPHILS NFR BLD AUTO: 0.1 % (ref 0–1.5)
BENZODIAZ UR QL SCN: NEGATIVE
BILIRUB SERPL-MCNC: 0.3 MG/DL (ref 0–1.2)
BUN SERPL-MCNC: 11 MG/DL (ref 6–20)
BUN/CREAT SERPL: 12.5 (ref 7–25)
BUPRENORPHINE SERPL-MCNC: NEGATIVE NG/ML
CALCIUM SPEC-SCNC: 9.1 MG/DL (ref 8.6–10.5)
CANNABINOIDS SERPL QL: NEGATIVE
CHLORIDE SERPL-SCNC: 108 MMOL/L (ref 98–107)
CO2 SERPL-SCNC: 25.7 MMOL/L (ref 22–29)
COCAINE UR QL: NEGATIVE
CREAT SERPL-MCNC: 0.88 MG/DL (ref 0.76–1.27)
DEPRECATED RDW RBC AUTO: 39.8 FL (ref 37–54)
EGFRCR SERPLBLD CKD-EPI 2021: 123.1 ML/MIN/1.73
EOSINOPHIL # BLD AUTO: 0.18 10*3/MM3 (ref 0–0.4)
EOSINOPHIL NFR BLD AUTO: 2.3 % (ref 0.3–6.2)
ERYTHROCYTE [DISTWIDTH] IN BLOOD BY AUTOMATED COUNT: 13.8 % (ref 12.3–15.4)
ETHANOL BLD-MCNC: <10 MG/DL (ref 0–10)
ETHANOL UR QL: <0.01 %
FENTANYL UR-MCNC: NEGATIVE NG/ML
GLOBULIN UR ELPH-MCNC: 2.5 GM/DL
GLUCOSE SERPL-MCNC: 110 MG/DL (ref 65–99)
HCT VFR BLD AUTO: 42.6 % (ref 37.5–51)
HGB BLD-MCNC: 14.1 G/DL (ref 13–17.7)
HOLD SPECIMEN: NORMAL
HOLD SPECIMEN: NORMAL
IMM GRANULOCYTES # BLD AUTO: 0.02 10*3/MM3 (ref 0–0.05)
IMM GRANULOCYTES NFR BLD AUTO: 0.3 % (ref 0–0.5)
LYMPHOCYTES # BLD AUTO: 2.73 10*3/MM3 (ref 0.7–3.1)
LYMPHOCYTES NFR BLD AUTO: 35.6 % (ref 19.6–45.3)
MCH RBC QN AUTO: 26.4 PG (ref 26.6–33)
MCHC RBC AUTO-ENTMCNC: 33.1 G/DL (ref 31.5–35.7)
MCV RBC AUTO: 79.6 FL (ref 79–97)
METHADONE UR QL SCN: NEGATIVE
MONOCYTES # BLD AUTO: 0.52 10*3/MM3 (ref 0.1–0.9)
MONOCYTES NFR BLD AUTO: 6.8 % (ref 5–12)
NEUTROPHILS NFR BLD AUTO: 4.21 10*3/MM3 (ref 1.7–7)
NEUTROPHILS NFR BLD AUTO: 54.9 % (ref 42.7–76)
NRBC BLD AUTO-RTO: 0 /100 WBC (ref 0–0.2)
OPIATES UR QL: NEGATIVE
OXYCODONE UR QL SCN: NEGATIVE
PCP UR QL SCN: NEGATIVE
PLATELET # BLD AUTO: 284 10*3/MM3 (ref 140–450)
PMV BLD AUTO: 9 FL (ref 6–12)
POTASSIUM SERPL-SCNC: 3.5 MMOL/L (ref 3.5–5.2)
PROT SERPL-MCNC: 6.5 G/DL (ref 6–8.5)
RBC # BLD AUTO: 5.35 10*6/MM3 (ref 4.14–5.8)
SALICYLATES SERPL-MCNC: 1.4 MG/DL
SODIUM SERPL-SCNC: 143 MMOL/L (ref 136–145)
T4 FREE SERPL-MCNC: 1.26 NG/DL (ref 0.92–1.68)
TRICYCLICS UR QL SCN: NEGATIVE
TSH SERPL DL<=0.05 MIU/L-ACNC: 0.76 UIU/ML (ref 0.27–4.2)
WBC NRBC COR # BLD AUTO: 7.67 10*3/MM3 (ref 3.4–10.8)
WHOLE BLOOD HOLD COAG: NORMAL
WHOLE BLOOD HOLD SPECIMEN: NORMAL

## 2024-11-01 PROCEDURE — 80143 DRUG ASSAY ACETAMINOPHEN: CPT | Performed by: EMERGENCY MEDICINE

## 2024-11-01 PROCEDURE — 93010 ELECTROCARDIOGRAM REPORT: CPT | Performed by: STUDENT IN AN ORGANIZED HEALTH CARE EDUCATION/TRAINING PROGRAM

## 2024-11-01 PROCEDURE — 84443 ASSAY THYROID STIM HORMONE: CPT | Performed by: EMERGENCY MEDICINE

## 2024-11-01 PROCEDURE — 85025 COMPLETE CBC W/AUTO DIFF WBC: CPT

## 2024-11-01 PROCEDURE — 82077 ASSAY SPEC XCP UR&BREATH IA: CPT | Performed by: EMERGENCY MEDICINE

## 2024-11-01 PROCEDURE — 84439 ASSAY OF FREE THYROXINE: CPT | Performed by: EMERGENCY MEDICINE

## 2024-11-01 PROCEDURE — 80307 DRUG TEST PRSMV CHEM ANLYZR: CPT | Performed by: EMERGENCY MEDICINE

## 2024-11-01 PROCEDURE — 80179 DRUG ASSAY SALICYLATE: CPT | Performed by: EMERGENCY MEDICINE

## 2024-11-01 PROCEDURE — 80053 COMPREHEN METABOLIC PANEL: CPT | Performed by: EMERGENCY MEDICINE

## 2024-11-01 PROCEDURE — 93005 ELECTROCARDIOGRAM TRACING: CPT | Performed by: EMERGENCY MEDICINE

## 2024-11-01 PROCEDURE — 99285 EMERGENCY DEPT VISIT HI MDM: CPT

## 2024-11-01 RX ORDER — SODIUM CHLORIDE 0.9 % (FLUSH) 0.9 %
10 SYRINGE (ML) INJECTION AS NEEDED
Status: DISCONTINUED | OUTPATIENT
Start: 2024-11-01 | End: 2024-11-02 | Stop reason: HOSPADM

## 2024-11-02 ENCOUNTER — HOSPITAL ENCOUNTER (INPATIENT)
Facility: HOSPITAL | Age: 24
LOS: 3 days | Discharge: HOME | DRG: 881 | End: 2024-11-05
Attending: PSYCHIATRY & NEUROLOGY | Admitting: PSYCHIATRY & NEUROLOGY
Payer: MEDICAID

## 2024-11-02 LAB
QT INTERVAL: 356 MS
QTC INTERVAL: 445 MS

## 2024-11-02 RX ORDER — ALUMINA, MAGNESIA, AND SIMETHICONE 2400; 2400; 240 MG/30ML; MG/30ML; MG/30ML
15 SUSPENSION ORAL EVERY 6 HOURS PRN
Status: DISCONTINUED | OUTPATIENT
Start: 2024-11-02 | End: 2024-11-05 | Stop reason: HOSPADM

## 2024-11-02 RX ORDER — QUETIAPINE FUMARATE 50 MG/1
50 TABLET, EXTENDED RELEASE ORAL NIGHTLY
Status: DISCONTINUED | OUTPATIENT
Start: 2024-11-02 | End: 2024-11-03

## 2024-11-02 RX ORDER — LEVETIRACETAM 500 MG/1
500 TABLET ORAL 2 TIMES DAILY
Status: ON HOLD | COMMUNITY
End: 2024-11-02

## 2024-11-02 RX ORDER — LEVETIRACETAM 500 MG/1
500 TABLET ORAL 2 TIMES DAILY
Status: DISCONTINUED | OUTPATIENT
Start: 2024-11-02 | End: 2024-11-05 | Stop reason: HOSPADM

## 2024-11-02 RX ORDER — LOPERAMIDE HYDROCHLORIDE 2 MG/1
2 CAPSULE ORAL
Status: DISCONTINUED | OUTPATIENT
Start: 2024-11-02 | End: 2024-11-05 | Stop reason: HOSPADM

## 2024-11-02 RX ORDER — NICOTINE 21 MG/24HR
1 PATCH, TRANSDERMAL 24 HOURS TRANSDERMAL DAILY PRN
Status: DISCONTINUED | OUTPATIENT
Start: 2024-11-02 | End: 2024-11-05 | Stop reason: HOSPADM

## 2024-11-02 RX ORDER — TRAZODONE HYDROCHLORIDE 100 MG/1
100 TABLET ORAL NIGHTLY PRN
Status: DISCONTINUED | OUTPATIENT
Start: 2024-11-02 | End: 2024-11-05 | Stop reason: HOSPADM

## 2024-11-02 RX ORDER — HYDROXYZINE HYDROCHLORIDE 25 MG/1
50 TABLET, FILM COATED ORAL EVERY 6 HOURS PRN
Status: DISCONTINUED | OUTPATIENT
Start: 2024-11-02 | End: 2024-11-05 | Stop reason: HOSPADM

## 2024-11-02 RX ORDER — ACETAMINOPHEN 325 MG/1
650 TABLET ORAL EVERY 6 HOURS PRN
Status: DISCONTINUED | OUTPATIENT
Start: 2024-11-02 | End: 2024-11-05 | Stop reason: HOSPADM

## 2024-11-02 RX ORDER — ALBUTEROL SULFATE 90 UG/1
2 INHALANT RESPIRATORY (INHALATION) EVERY 4 HOURS PRN
Status: DISCONTINUED | OUTPATIENT
Start: 2024-11-02 | End: 2024-11-05 | Stop reason: HOSPADM

## 2024-11-02 RX ADMIN — FLUOXETINE HYDROCHLORIDE 20 MG: 20 CAPSULE ORAL at 11:58

## 2024-11-02 RX ADMIN — LEVETIRACETAM 500 MG: 500 TABLET, FILM COATED ORAL at 11:58

## 2024-11-02 NOTE — H&P
" [facility]   PSYCHIATRIC  HISTORY AND PHYSICAL    Patient Name: Linwood Ptael  : 2000  MRN: [id number]  Primary Care Physician:  Provider, No Known  Date of admission: 2024    Subjective   Subjective     Chief Complaint: \"I just came here to get back on my meds and because I was having suicidal thoughts.\"     HPI:     Linwood Patel is a 24 y.o. male who presented to ED 2024 reporting suicidal ideation. States he was at [facility] for substance use treatment and reported having SI and staff from [facility] took him to [facility] ED. He reports that he has not been taking medications for approximately 3 weeks and wants to have his medications restarted. He states that he was taking prozac, seroquel and keppra. Reports recent depression, increased grief related to girlfriend passing away from overdose 2 weeks ago. He reports his suicidal ideation was intermittent over past several weeks but he denies si hi and avh currently. He states that he feels depressed but denies anxiety. States \"I'm hungrya nd just want to get back on my meds.\" He is agreeable to resuming prozac and seroquel at starting doses as well as keppra. Hydroxyzine and inhaler resumed PRN. Patient reported to this writer it has been \"at least a week\" since last use of drugs or alcohol but per chart review he reported that he used methamphetamines 3 days ago on admission. Patient reports motivation for sobriety and denies withdrawal symptoms, denies cravings. States he feels hopeful, he identifies reasons to live and is future oriented. He verbalizes awareness of 988 crisis line number. He denies access to firearms. Per ED physician nelia has superficial lacs on forearm related to suicide attempt several days ago. Patient denies SI or urges to self harm at this time. States he feels safe in hospital. Reports he tolerates medications well and would like to resume.     Denies manic symptoms, denies symptoms of psychosis. He is " minimally engaging at times and guarded at times overall. Remains irritable.          Review of Systems:   A 14 point ROS was completed and negative aside from pertinent positives in HPI above.    CONSTITUTIONAL: denies night sweats, cough, shortness of air   PSYCHIATRIC: As documented in HPI    Personal History     Past Medical History:   Diagnosis Date    Anxiety     Bipolar 1 disorder     Depression     PTSD (post-traumatic stress disorder)     Substance abuse        Past Surgical History:   Procedure Laterality Date    KNEE ACL RECONSTRUCTION Right     KNEE ACL RECONSTRUCTION Right 05/2016       Past Psychiatric History:   Past Psychiatric Treatment: reports ,multiple past inpatient admissions including on [facility]. States some of his hospitalizations were in [place] and [place].     Suicide Attempts: Denies history of suicide attempts however per chart review and collaboration with ED physician patient attempted suicide by cutting self several days ago, superficial lacs noted.     Prior Treatment and Medications Tried: patient reports past trials with multiple medications. He reports current regimen effective.       Family History: family history includes Alcohol abuse in his father and mother; Depression in his mother; Diabetes in his father; Drug abuse in his brother, brother, father, and mother; Hypertension in his father. Otherwise pertinent FHx was reviewed and not pertinent to current issue.    Family Psych History:patent denies     Family Substance Abuse History:patient denies     Family Suicide History:patient denies     Social History:     Social History     Socioeconomic History    Marital status: Single    Number of children: 1    Years of education: 12th grade    Highest education level: 12th grade   Tobacco Use    Smoking status: Every Day     Current packs/day: 0.50     Types: Cigarettes    Smokeless tobacco: Current     Types: Snuff   Vaping Use    Vaping status: Every Day   Substance and  "Sexual Activity    Alcohol use: Yes     Comment: Pt reported upon admission that he drank a pint of Vodka at 6pm before coming to ED. Lab etoh WDL.    Drug use: Yes     Types: Methamphetamines, Cocaine(coke), \"Crack\" cocaine, Heroin    Sexual activity: Yes     Partners: Female     Patient reports he is single. States graduated high school. He states that he does not have any children. States he is unhoused. He reports no  experience. States he is unemployed. States that he does not have Worship preference. Denies legal concerns.     Substance Abuse History: reports that he has been smoking cigarettes. His smokeless tobacco use includes snuff. He reports current alcohol use. He reports current drug use. Drugs: Methamphetamines, Cocaine(coke), \"Crack\" cocaine, and Heroin.  Inconsistent in reporting regarding last methamphetamine use. Denies recent alcohol use. He is current smoker.       Home Medications:      Patient was not taking home meds for past several weeks.     Allergies:  Allergies   Allergen Reactions    Capsicum Annuum Extract & Derivative (Bell Pepper) [Capsicum] Hives    Contrast Dye (Echo Or Unknown Ct/Mr) Anaphylaxis     Swelling, rash to CT contrast dye    Fish-Derived Products Anaphylaxis    Onion Anaphylaxis    Peanut-Containing Drug Products Hives    Tomato Itching       Objective   Objective     Vitals:   Temp:  [97.7 °F (36.5 °C)-98.2 °F (36.8 °C)] 97.7 °F (36.5 °C)  Heart Rate:  [71-98] 71  Resp:  [16-18] 16  BP: (106-139)/(77-89) 106/77    Physical Exam:      CONSTITUTIONAL: Patient is well developed, well nourished, awake and alert.  HEENT: Head and neck are normocephalic and atraumatic.   LUNGS: Even unlabored respirations.  EXTREMITIES: No clubbing, cyanosis, edema.  MUSCULOSKELETAL: Symmetric body habitus. Spine straight. Strength intact,  NEUROLOGIC: Appropriate. No abnormal movements, good muscle tone.                              Cerebellar: station and gait steady.  Cranial " "Nerves:  CN II: Visual fields without deficit.  CN III: Pupils symmetric.  CN III, IV, VI:  Extraocular eye muscles intact, no nystagmus.  CN V: Jaw open and closing normal.  CN VII: Frown and smile symmetric.  CN VIII: Hearing intact.  CN IX, X: Normal; phonation without hoarseness.  CN XI: Shoulder shrug equal.  CN XII:  no dysarthria.        Mental Status Exam:        Hygiene:   decreased  Cooperation:  partially cooperative but guarded and minimally engaging   Eye Contact:  decreased  Psychomotor Behavior: slowed  Affect:  depressed, irritable   Mood: \"ok\" \"depressed\"  Speech:  normal rate, depressed and irritable tone, normal volume  Language: english, fluent   Thought Process:  linear and goal directed  Thought Content:  denies si hi and avh, no delusional content noted   Suicidal:  patient denies  Homicidal:  patient denies   Hallucinations:  denies avh and tactile hallucinations   Delusion:  none noted   Memory:  in tact grossly   Orientation:  name, state, year   Reliability:  fair   Insight:  limited  Judgement:  limited overall but fair during assessment today   Impulse Control:  fair during assessment         Result Review    Result Review:  I have personally reviewed the results from the time of this admission to 11/2/2024 18:09 EDT and agree with these findings:  [x]  Laboratory  []  Microbiology  []  Radiology  [x]  EKG/Telemetry   []  Cardiology/Vascular   []  Pathology  []  Old records  []  Other:  Most notable findings include: cmp no acute concerns , lfts wnl, tsh wnl, cbc no acute concerns , tylenol less than 5, salicylate 1.4,bal less than 10 , uds ashowed methamphetamines present ; EKG sinus rhythm qtc no acute concerns     Assessment & Plan   Assessment / Plan       Active Hospital Problems:  Active Hospital Problems    Diagnosis     **Depression    Mood disorder, unspecified   Stimulant use disorder, continuous, severe  Tobacco use disorder, continuous, severe  Alcohol use disorder, in " remission     Plan:     Admit for safety and stabilization and placed on appropriate precautions.  Begin treatment for underlying mood disorder or psychosis with appropriate medications.  Treatment team to assess and implement appropriate treatment plan for the patient's care.  Patient is voluntary and agreeable to treatment at this time.   Attempt to gain collateral information of possible  Work on safety plan, suicide precautions, d/c 1;1, patient denies si, protective factors as above, continue q15 minute checks for safety   Block room due to stimulant use and recent agitation  Seizure precautions, resume home keppra as above   Resume prozac and quetiapine at starting doses as above, patient has tolerated well in past but has not been taking for several weeks. Monitor response and titrate as indicated and tolerated. Obtain baseline metabolic monitoring including A1c and lipid panel.  Provide supportive therapy  Patient to engage in all group and individual treatment modalities available including milieu therapy  Work on appropriate disposition follow-up  Estimated length of stay in hospital 4 to 5 days      VTE Prophylaxis:  No VTE prophylaxis order currently exists.        CODE STATUS:    Code Status (Patient has no pulse and is not breathing): CPR (Attempt to Resuscitate)  Medical Interventions (Patient has pulse or is breathing): Full Support      Admission Status:  I believe this patient meets criteria for inpatient admission.        Electronically signed by Jayce Vergara MD, 11/02/24, 6:09 PM EDT.

## 2024-11-02 NOTE — NURSING NOTE
Linwood Patel is a 24/m admitted voluntarily to [facility], under the care of Dr Valdovinos, for depression.  Pt arrived to unit at 0336 by wheelchair, escorted by security guards x 2, MHT, and CWA.  Pt was calm and cooperative with search, orientation to unit and initial assessment.  Pt explains that he went to [place] today from the streets and began having SI, which led to him being seen in [facility] ED tonight.  Pt also reports that he has been off of his home meds for about 3 wks and needs them restarted.  Pt reports increased stress r/t his girlfriend passing away approx 2 wks ago from a drug overdose.  Pt currently reports SI that comes and goes.  Pt presents with several superficial lacerations to his RUE r/t attempting suicide approx 2 days ago.  Pt denies HI or a/v/h.  Pt rates anxiety and depression as 0/10 at this time.  Pt reports his last use of meth as 3 days ago and thc approx 4 years ago.  Pt reports that he smokes approx 1/2 ppd of cigarettes and denies use of alcohol.  Pt has been placed on close watch r/t High Risk for Suicide per [place] Risk Assessment Tool.  Pt verbalizes understanding of this process.  Pt was given a snack and Gatorade.  Pt denies further needs at this time.  No s/s of acute distress observed at this time.  CW at bedside.  Care plan initiated.

## 2024-11-02 NOTE — PLAN OF CARE
Goal Outcome Evaluation:  Plan of Care Reviewed With: patient  Patient Agreement with Plan of Care: agrees         Pt has been resting in bed since completing intake process.  No self harm behaviors observed.  No s/s of acute distress observed.  CW remains at bedside.  Care plan continued.

## 2024-11-02 NOTE — PLAN OF CARE
"Goal Outcome Evaluation:  Plan of Care Reviewed With: patient  Plan of Care Reviewed With: patient  Patient Agreement with Plan of Care: agrees         Patient calm and cooperative. Reports having suicidal thoughts that \"come and go.\" Denies homicidal ideations and a/v hallucinations. Rates anxiety 5, depression 6. Has been withdrawn to room sleeping most of the day. Compliant with medications. Contracts for safety.                              "

## 2024-11-02 NOTE — ED PROVIDER NOTES
Time: 9:40 PM EDT  Date of encounter:  11/1/2024  Independent Historian/Clinical History and Information was obtained by:   Patient    History is limited by: N/A    Chief Complaint: Depression and suicidal ideation      History of Present Illness:  Patient is a 24 y.o. year old male who presents to the emergency department for evaluation of depression and suicidal ideation.  Patient reports a history of bipolar 1 disorder, PTSD, anxiety and depression with history of methamphetamine abuse.  Patient has been homeless and living in the [place] area.  Today he was sent to [facility] for sober living and drug rehab but was sent to the emergency department for suicidal ideation patient.  Patient has superficial cuts to his right forearm.  Patient reports losing his girlfriend 2 weeks ago to an overdose.  Patient states he has a plan to overdose fentanyl.      Patient Care Team  Primary Care Provider: Provider, No Known    Past Medical History:     Allergies   Allergen Reactions    Capsicum Annuum Extract & Derivative (Bell Pepper) [Capsicum] Hives    Contrast Dye (Echo Or Unknown Ct/Mr) Anaphylaxis     Swelling, rash to CT contrast dye    Fish-Derived Products Anaphylaxis    Onion Anaphylaxis    Peanut-Containing Drug Products Hives    Tomato Itching     Past Medical History:   Diagnosis Date    Anxiety     Bipolar 1 disorder     Depression     PTSD (post-traumatic stress disorder)     Substance abuse      Past Surgical History:   Procedure Laterality Date    KNEE ACL RECONSTRUCTION Right     KNEE ACL RECONSTRUCTION Right 05/2016     Family History   Problem Relation Age of Onset    Depression Mother     Drug abuse Mother     Alcohol abuse Mother     Diabetes Father     Hypertension Father     Drug abuse Father     Alcohol abuse Father     Drug abuse Brother     Drug abuse Brother     Suicide Attempts Neg Hx        Home Medications:  Prior to Admission medications    Medication Sig Start Date End Date Taking?  "Authorizing Provider   FLUoxetine (PROzac) 20 MG capsule Take 1 capsule by mouth daily. 12/11/23   Roger Valdovinos MD   hydrOXYzine pamoate (VISTARIL) 25 MG capsule Take 1 capsule by mouth 3 (Three) Times a Day As Needed for Anxiety. Indications: Feeling Anxious 12/7/23   Roger Valdovinos MD   prazosin (MINIPRESS) 2 MG capsule Take 1 capsule by mouth every night. 12/11/23   Roger Valdovinos MD   QUEtiapine (SEROquel) 100 MG tablet Take 1 tablet by mouth Every Night. Indications: Major Depressive Disorder 12/11/23   Roger Valdovinos MD   Ventolin  (90 Base) MCG/ACT inhaler Inhale 2 puffs Every 6 (Six) Hours As Needed. 11/1/23   ProviderRuss MD        Social History:   Social History     Tobacco Use    Smoking status: Every Day     Current packs/day: 0.50     Types: Cigarettes    Smokeless tobacco: Current     Types: Snuff   Vaping Use    Vaping status: Every Day   Substance Use Topics    Alcohol use: Yes     Comment: Pt reported upon admission that he drank a pint of Vodka at 6pm before coming to ED. Lab etoh WDL.    Drug use: Yes     Types: Methamphetamines, Cocaine(coke), \"Crack\" cocaine, Heroin         Review of Systems:  Review of Systems   Psychiatric/Behavioral:  Positive for dysphoric mood and suicidal ideas.         Physical Exam:  /81 (BP Location: Right arm, Patient Position: Sitting)   Pulse 98   Temp 98.2 °F (36.8 °C) (Oral)   Resp 18   Ht 188 cm (74\")   Wt 111 kg (245 lb)   SpO2 100%   BMI 31.46 kg/m²     Physical Exam  Vitals and nursing note reviewed.   Constitutional:       General: He is not in acute distress.  HENT:      Head: Normocephalic and atraumatic.   Cardiovascular:      Rate and Rhythm: Normal rate.   Pulmonary:      Effort: Pulmonary effort is normal. No respiratory distress.   Musculoskeletal:         General: Normal range of motion.      Cervical back: Normal range of motion.   Skin:     General: Skin is warm and dry.      Comments: Superficial linear cuts to right forearm " volar aspect   Neurological:      Mental Status: He is alert and oriented to person, place, and time.   Psychiatric:         Attention and Perception: Attention normal.         Mood and Affect: Mood normal.         Speech: Speech normal.         Behavior: Behavior normal. Behavior is cooperative.         Thought Content: Thought content includes suicidal ideation. Thought content includes suicidal plan.         Cognition and Memory: Cognition normal.         Judgment: Judgment normal.                  Procedures:  Procedures      Medical Decision Making:      Comorbidities that affect care:    Substance Abuse    External Notes reviewed:    Previous ED Note: Patient was seen at [facility] emergency department on 10/18/2024 requesting medical clearance for drug rehab.      The following orders were placed and all results were independently analyzed by me:  Orders Placed This Encounter   Procedures    [place] Draw    Comprehensive Metabolic Panel    Acetaminophen Level    Ethanol    Salicylate Level    Urine Drug Screen - Urine, Clean Catch    CBC Auto Differential    TSH    T4, Free    Fentanyl, Urine - Urine, Clean Catch    Continuous Pulse Oximetry    Vital Signs    Undress & Gown    POC Glucose Once    ECG 12 Lead Other; si protocol    CBC & Differential    Green Top (Gel)    Lavender Top    Gold Top - SST    Light Blue Top       Medications Given in the Emergency Department:  Medications - No data to display       ED Course:    ED Course as of 11/04/24 0959   Fri Nov 01, 2024   2100 EKG:    Rhythm: Normal sinus rhythm  Rate: Normal  Intervals: Normal  T-wave: Normal  ST Segment: Normal    EKG Comparison: 6/29/2024 similar    Interpreted by me   [NL]   Sat Nov 02, 2024   0028 I discussed patient with Dr. Vergara of psychiatry who accepts the patient to [facility] [JS]      ED Course User Index  [JS] David Duarte MD  [NL] Preet Marino DO       Labs:    Lab Results (last 24 hours)       ** No results found  for the last 24 hours. **             Imaging:    No Radiology Exams Resulted Within Past 24 Hours      Differential Diagnosis and Discussion:    Psychiatric: Differential diagnosis includes but is not limited to depression, psychosis, bipolar disorder, anxiety, manic episode, schizophrenia, and substance abuse.    All labs were reviewed and interpreted by me.    MAKSIM             Patient Care Considerations:        Consultants/Shared Management Plan:  Patient was discussed with Dr. Vergara, psychiatry, who admitted the patient to [facility].      Social Determinants of Health:    Patient is independent, reliable, and has access to care.       Disposition and Care Coordination:    Psychiatric Admission: Through independent evaluation of the patient's history and physical and consultation with psychiatry, the patient meets criteria for admission to a psychiatric facility.        Final diagnoses:   Depression with suicidal ideation        ED Disposition       ED Disposition   DC/Transfer to Behavioral Health    [facility]   Stable    Comment   --               This medical record created using voice recognition software.             Preet Marino DO  11/04/24 1000

## 2024-11-02 NOTE — ED NOTES
Report called to [facility]. All questions answered. Transfer of care acknowleded at this time.

## 2024-11-03 LAB
CHOLEST SERPL-MCNC: 129 MG/DL (ref 0–200)
HBA1C MFR BLD: 5 % (ref 4.8–5.6)
HDLC SERPL-MCNC: 38 MG/DL (ref 40–60)
LDLC SERPL CALC-MCNC: 68 MG/DL (ref 0–100)
LDLC/HDLC SERPL: 1.72 {RATIO}
TRIGL SERPL-MCNC: 128 MG/DL (ref 0–150)
VLDLC SERPL-MCNC: 23 MG/DL (ref 5–40)

## 2024-11-03 PROCEDURE — 80061 LIPID PANEL: CPT | Performed by: PSYCHIATRY & NEUROLOGY

## 2024-11-03 PROCEDURE — 83036 HEMOGLOBIN GLYCOSYLATED A1C: CPT | Performed by: PSYCHIATRY & NEUROLOGY

## 2024-11-03 RX ORDER — QUETIAPINE FUMARATE 50 MG/1
100 TABLET, EXTENDED RELEASE ORAL NIGHTLY
Status: DISCONTINUED | OUTPATIENT
Start: 2024-11-03 | End: 2024-11-04

## 2024-11-03 RX ADMIN — LEVETIRACETAM 500 MG: 500 TABLET, FILM COATED ORAL at 20:51

## 2024-11-03 RX ADMIN — LEVETIRACETAM 500 MG: 500 TABLET, FILM COATED ORAL at 08:14

## 2024-11-03 RX ADMIN — FLUOXETINE HYDROCHLORIDE 20 MG: 20 CAPSULE ORAL at 08:14

## 2024-11-03 RX ADMIN — QUETIAPINE FUMARATE 50 MG: 50 TABLET, EXTENDED RELEASE ORAL at 00:14

## 2024-11-03 RX ADMIN — LEVETIRACETAM 500 MG: 500 TABLET, FILM COATED ORAL at 00:15

## 2024-11-03 RX ADMIN — QUETIAPINE FUMARATE 100 MG: 50 TABLET, EXTENDED RELEASE ORAL at 20:51

## 2024-11-03 NOTE — PLAN OF CARE
Goal Outcome Evaluation:  Plan of Care Reviewed With: patient  Patient Agreement with Plan of Care: agrees    Pt is alert and oriented and able to make needs known.  Pt denies SI or HI.  Pt denies a/v/h. Pt rates anxiety and depression 3/10.  Pt required verbal redirection r/t demanding snack outside of schedule.  Pt reported that he slept through dinner, which is inconsistent with I&O documentation.  Pt responded well to compromise and snack started early after pt demonstrated self control.  Pt cooperated with night meds and assessment.  No s/s of acute distress observed at this time.  Care plan is ongoing.

## 2024-11-03 NOTE — PLAN OF CARE
"Goal Outcome Evaluation:  Plan of Care Reviewed With: patient  Plan of Care Reviewed With: patient  Patient Agreement with Plan of Care: agrees     Progress: no change      Patient calm and cooperative. Still reporting suicidal thoughts that \"come and go.\" Denies homicidal ideations and a/v hallucinations. Rates anxiety 2, depression 3. Has been withdrawn to room sleeping most of the day. Compliant with medications. Contracts for safety.                            "

## 2024-11-03 NOTE — PROGRESS NOTES
" [facility]     Psychiatric Progress Note    Patient Name: Linwood Patel  : 2000  MRN: [id number]  Primary Care Physician:  Provider, No Known  Date of admission: 2024    Subjective   Subjective     Chief Complaint: \"I'm ok\"    HPI:     Patient seen and chart reviewed, discussed with staff.  Nursing: patient alert and oriented, denies si hi and avh. Rates depression and anxiety 3/10. Was demanding at one point regarding snack and required redirection. Slept well, eating well. Cooperative with medications.       On my interview patient minimally engaging. He is isolative to room. Dismissive, somewhat irritable. Reports depression and anxiety but states improving. Denies si hi and avh. States he is med adherent, denies adverse effects from medications, states would like to titrate medss this week. He states he has been adherent to keppra, states has been diagnosed with seizure disorder, cannot recall last seizure but denies recent seizure. Denies physical concerns today.       Objective   Objective     Vitals:   Temp:  [97.5 °F (36.4 °C)] 97.5 °F (36.4 °C)  Heart Rate:  [86] 86  Resp:  [18] 18  BP: (122)/(106) 122/106          Mental Status Exam:      Appearance:   disheveled  Reliability:   decreased  Eye Contact:  decreased  Concentration/Focus:    fair/fair  Behaviors:    isolative  Memory :    in tact  Speech:    normal rate, irritable and depressed tone, low volume  Language:   english, fluent   Mood :    \"ok\" \"depressed\"  Affect:    dismissive, depressed, irritable   Thought process:    linear, goal directed  Thought Content:    denies si hi and avh, no delusional content noted   Insight:   limited but improving   Judgement:    fair during assessment today     Review of Systems:     A complete 14 point review of systems was completed and all systems were negative unless otherwise noted in HPI above.     Result Review    Result Review:  I have personally reviewed the results from the time of this " admission to 11/3/2024 17:00 EST and agree with these findings:  [x]  Laboratory  []  Microbiology  []  Radiology  []  EKG/Telemetry   []  Cardiology/Vascular   []  Pathology  []  Old records  []  Other:  Most notable findings include: lipid panel and A1c unremarkbale    Medications:   FLUoxetine, 20 mg, Oral, Daily  levETIRAcetam, 500 mg, Oral, BID  QUEtiapine fumarate ER, 50 mg, Oral, Nightly        Assessment & Plan   Assessment / Plan       Active Hospital Problems:  Active Hospital Problems    Diagnosis     **Depression        Plan:        Continue admission for safety and stabilization and placed on appropriate precautions.  Continue treatment for underlying mood disorder or psychosis with appropriate medications.  Treatment team to assess and implement appropriate treatment plan for the patient's care.  Patient is voluntary and agreeable to treatment at this time.   Attempt to gain collateral information of possible  Work on safety plan, suicide precautions, continue q15 minute checks for safety   Block room due to stimulant use and recent agitation  Seizure precautions  Continue medications as above but will increase quetiapine [id number] at bedtime for mood. Titrate as indicated and tolerated. patient had tolerated well in past but had not been taking for several weeks. Monitor response and titrate as indicated and tolerated. Baseline metabolic monitoring reviewed and no concerns   Provide supportive therapy  Patient to engage in all group and individual treatment modalities available including milieu therapy  Work on appropriate disposition follow-up  Estimated length of stay in hospital 3to 4 days       Disposition:  I expect patient to be discharged in 3-4 days.     Electronically signed by Jayce Vergara MD, 11/03/24, 5:00 PM EST.

## 2024-11-04 RX ORDER — QUETIAPINE FUMARATE 50 MG/1
150 TABLET, EXTENDED RELEASE ORAL NIGHTLY
Status: DISCONTINUED | OUTPATIENT
Start: 2024-11-04 | End: 2024-11-05 | Stop reason: HOSPADM

## 2024-11-04 RX ADMIN — QUETIAPINE FUMARATE 150 MG: 50 TABLET, EXTENDED RELEASE ORAL at 20:27

## 2024-11-04 RX ADMIN — FLUOXETINE HYDROCHLORIDE 20 MG: 20 CAPSULE ORAL at 10:03

## 2024-11-04 RX ADMIN — LEVETIRACETAM 500 MG: 500 TABLET, FILM COATED ORAL at 10:03

## 2024-11-04 RX ADMIN — LEVETIRACETAM 500 MG: 500 TABLET, FILM COATED ORAL at 20:27

## 2024-11-04 NOTE — PROGRESS NOTES
" [facility]     Psychiatric Progress Note    Patient Name: Linwood Patel  : 2000  MRN: [id number]  Primary Care Physician:  Provider, No Known  Date of admission: 2024    Subjective   Subjective     Chief Complaint: \"I'm ok\"    HPI:     Patient seen and chart reviewed, discussed with staff.  Nursing: withdrawn to room, states wants to return to substance use treatment program, eating well, slept well. States he does not want to take medications but per nursing has been adherent. Denies si hi and avh and depression. Reports anxiety 3/10.       On my interview, patient pleasant and calm. He states that he had SI overnight but better and improved today. States that he is motivated for sobriety. Denies hi and avh. States he is feeling better. States he is adherent to medication and states he wants to continue his medications including quetiapine and prozac. He states he \"thinks it is helping\" and states he would like to titrate medication this evening.This is inconsistent with his report to nurse stating does not want to take medication. He states he spoke with support system. States no other concerns today. He is hopeful, identifies reasons to live and support and reports future orientation to return to treatment program.       Objective   Objective     Vitals:   Temp:  [97.3 °F (36.3 °C)] 97.3 °F (36.3 °C)  Heart Rate:  [] 87  Resp:  [18] 18  BP: ()/(8-83) 111/8          Mental Status Exam:      Appearance:   hospital attire, grooming in tact  Reliability:   fair  Eye Contact:  fair   Concentration/Focus:    fair/fair  Behaviors:    isolative but out for meals and to make needs known   Memory :    in tact  Speech:    normal rate, less depressed tone, normal volume   Language:   english, fluent   Mood :    \"ok\"   Affect:    less depressed, more engaging today   Thought process:    linear, goal directed  Thought Content:   SI overnight but reports no si today, denies HI and avh, does not appear " to be responding to internal stimuli. no delusional content noted   Insight:   improving   Judgement:    fair during assessment today   Impulse control: in tact     Review of Systems:     A complete 14 point review of systems was completed and all systems were negative unless otherwise noted in HPI above.     Result Review    Result Review:  I have personally reviewed the results from the time of this admission to 11/4/2024 14:00 EST and agree with these findings:  [x]  Laboratory  []  Microbiology  []  Radiology  []  EKG/Telemetry   []  Cardiology/Vascular   []  Pathology  []  Old records  []  Other:  Most notable findings include: no new labs resulted today     Medications:   FLUoxetine, 20 mg, Oral, Daily  levETIRAcetam, 500 mg, Oral, BID  QUEtiapine fumarate ER, 100 mg, Oral, Nightly        Assessment & Plan   Assessment / Plan       Active Hospital Problems:  Active Hospital Problems    Diagnosis     **Depression        Plan:        Continue admission for safety and stabilization and placed on appropriate precautions.  Continue treatment for underlying mood disorder or psychosis with appropriate medications.  Treatment team to assess and implement appropriate treatment plan for the patient's care.  Patient is voluntary and agreeable to treatment at this time. Plan will be to discharge tomorrow to [facility] substance use treatment program.   Attempt to gain collateral information of possible  Work on safety plan, suicide precautions, continue q15 minute checks for safety   Patients symptoms improved, will unblock room  Seizure precautions  Continue medications as above but will increase quetiapine [id number] at bedtime for mood per patient request. He verifies he would like to continue medications and states he has seizure disorder and reports he would like to continue keppra along with medications for mood. Baseline metabolic monitoring reviewed and no concerns   Provide supportive therapy  Patient to  engage in all group and individual treatment modalities available including milieu therapy  Work on appropriate disposition follow-up with .        Disposition:  I expect patient to be discharged in 1-2 days.

## 2024-11-04 NOTE — SIGNIFICANT NOTE
11/04/24 1212   Plan   Plan RN NAZIA introduced self, initiated discussion for safe discharge plan.   The patient reports he has no permanent address. Prior to admission, the patient was in treatment with the [place], [place].  Patient reports he is currently unemployed and without transportation.   Patient reports at discharge he plans to return to [facility].   RIYA MANDUJANO called and confirmed continuity of care with the [facility] after discharge.   [facility] verified will provide transportation at d/c. Phone: [phone]  Pharmacy verified Meds to bed per pt. request.  Patient reports no further questions or concerns at this time   Patient/Family in Agreement with Plan yes   Final Discharge Disposition Code   ([facility] [place], [place])

## 2024-11-04 NOTE — PLAN OF CARE
"Goal Outcome Evaluation:  Plan of Care Reviewed With: patient  Plan of Care Reviewed With: patient  Patient Agreement with Plan of Care: agrees with comment (describe) (Patient states \"I just don't want to take any medicines.\")     Progress: no change.  Patient has been withdrawn to room, resting and making needs known.  Patient freely comes to nursing station to select snack and make requests.  Patient was cooperative with pm medication, but questioned why the doctor wanted to give him medication when he has not taken any for 2-3 months. Education provided and encouraged to have a conversation with the doctor regarding his medication.  Patient disoriented to situation, insight poor. Patient denies S/I, H/I and A/V/H, rating depression and anxiety at 3/10.  Patient has been sleeping this shift, cooperative with pm snack.                                 "

## 2024-11-04 NOTE — PLAN OF CARE
Goal Outcome Evaluation:  Plan of Care Reviewed With: patient  Patient Agreement with Plan of Care: agrees       Pt is alert and oriented and able to make needs known.  Pt denies SI or HI.  Pt denies a/v/h.  Pt reports that he feels that he is ready for discharge.  Pt contacted [facility] [place], [place] and they will accept him back.  Dr Vergara was contacted and anticipates discharge tomorrow.  Pt was agreeable to this information.  Pt has spent time in the day room, eaten all meals, and cooperated with meds.  No s/s of acute distress observed at this time. Care plan is ongoing.

## 2024-11-04 NOTE — SIGNIFICANT NOTE
"   11/04/24 1134   Individual Counseling   Length of Session 10   Patient Response \"I'm ready to discharge today.\"     Subjective:  The patient expresses a strong desire to discharge from the psychiatric unit and return to his substance abuse treatment program. He reports having confirmed with the program staff that they are ready to receive him back and provide the necessary support for his continued recovery.    Objective:  The patient appears stable and motivated for discharge, with no signs of acute distress. He is alert, oriented, and demonstrates insight into his recovery needs. There are no immediate safety concerns.The patient informed discharge will not occur today and plan will be reassessed following day.     Assessment:  The patient is eager to resume treatment in his substance abuse program, which he has confirmed as a viable next step. He appears committed to maintaining recovery efforts and views the program as a supportive environment. Discharge may be clinically appropriate, provided there is a collaborative plan in place with the program to ensure continuity of care.    Plan:    1. Verify confirmation from the substance abuse program regarding patient's return and acceptance.  2. Coordinate with program staff to ensure continuity of treatment, including any necessary handover of care plans or medication management.  3. Complete discharge paperwork and provide the patient with relevant instructions.    Follow-Up:  Confirm continuity of care with the substance abuse program and assess progress and address any additional needs.  "

## 2024-11-05 VITALS
SYSTOLIC BLOOD PRESSURE: 120 MMHG | HEART RATE: 79 BPM | BODY MASS INDEX: 31.41 KG/M2 | TEMPERATURE: 97.9 F | HEIGHT: 74 IN | DIASTOLIC BLOOD PRESSURE: 78 MMHG | OXYGEN SATURATION: 100 % | RESPIRATION RATE: 18 BRPM | WEIGHT: 244.71 LBS

## 2024-11-05 RX ORDER — QUETIAPINE 150 MG/1
150 TABLET, FILM COATED, EXTENDED RELEASE ORAL NIGHTLY
Qty: 30 TABLET | Refills: 0 | Status: SHIPPED | OUTPATIENT
Start: 2024-11-05 | End: 2024-12-05

## 2024-11-05 RX ORDER — LEVETIRACETAM 500 MG/1
500 TABLET ORAL 2 TIMES DAILY
Qty: 60 TABLET | Refills: 0 | Status: SHIPPED | OUTPATIENT
Start: 2024-11-05 | End: 2024-12-05

## 2024-11-05 RX ADMIN — LEVETIRACETAM 500 MG: 500 TABLET, FILM COATED ORAL at 08:00

## 2024-11-05 RX ADMIN — FLUOXETINE HYDROCHLORIDE 20 MG: 20 CAPSULE ORAL at 08:00

## 2024-11-05 NOTE — SIGNIFICANT NOTE
11/05/24 0909   Plan   Final Note The plan is for the patient to discharge to [facility] VALENTINA Romo, RN NN verified transportation-  at 12:00pm. Primary RN notified

## 2024-11-05 NOTE — SIGNIFICANT NOTE
11/05/24 0925   Plan   Final Discharge Disposition Code   ([facility])   Final Note Patient will discharge back to [facility], the facility will arrange to transport the patient to treatment.     Reason for Admission  The patient was admitted for suicidal ideations, arrived from local substance abuse treatment facility.   Course of Treatment  During the patient's stay, he was offered individual therapy, group therapy, medication management. The patient engaged in treatment, adherent to medication management working toward symptom management.  Discharge Status  The patient is stable for discharge. They have demonstrated progress, insights gained, improved mood, increased motivation to return to treatment for addiction.  Discharge Plan  The patient will be discharged to [facility].  The following outpatient services are recommended:  1. Follow-Up Appointments: Patient will return to [facility] to continue substance abuse treatment.   Instructions Provided   Medication Adherence: The patient understands the importance of adherence to the prescribed medication regimen.   Follow-Up Care: Instructions for follow-up appointments. Patient to follow the recommendations of [facility]   Safety Plan: A safety plan was reviewed, including access to support resources.  Discharge Condition  The patient is alert, oriented, and stable for discharge. No acute distress noted at discharge.

## 2024-11-05 NOTE — SIGNIFICANT NOTE
11/05/24 1126   Group Therapy Session   Group Attendance refused to attend group session   Time Session Began 1000   Group Type psychoeducation   Group Topic Covered goal setting   Literature/Videos Given topic handouts   Patient Participation Detail Pt did not attend group session   Degree of Insight none     Patient notified of group session. Pt did not attend.

## 2024-11-05 NOTE — PLAN OF CARE
Goal Outcome Evaluation:  Plan of Care Reviewed With: patient  Plan of Care Reviewed With: patient  Patient Agreement with Plan of Care: agrees      Pt. Is calm and cooperative, withdrawn to his room. Pt. Denied any si/hi/avh and feels better and ready to go to the [facility] for substance abuse treatment.

## 2024-11-05 NOTE — PLAN OF CARE
Goal Outcome Evaluation:  Plan of Care Reviewed With: patient  Plan of Care Reviewed With: patient  Patient Agreement with Plan of Care: agrees     Progress: improving.  Patient has been withdrawn to room, except to make needs known and came to nursing station.  Patient states he had been in dayroom earlier in the day, went to a group and watched TV with peers.  Patient's affect is less irritable last night, and patient was cooperative with care.  Patient was cooperative with pm medication and snack and CFS.  Patient denied S/I, H/I and A/V/H, denies depression and anxiety.  Patient states he is ready to get back to [facility] house to finish sobriety treatment. Patient has been able to sleep last night.  Supportive care and safe environment provided.

## 2024-11-06 NOTE — DISCHARGE SUMMARY
"               [facility]         DISCHARGE SUMMARY    Patient Name: Linwood Patel  : 2000  MRN: [id number]    Date of Admission: 2024  Date of Discharge:  2024  Primary Care Physician: Provider, No Known    Consults       No orders found from 10/4/2024 to 11/3/2024.            Presenting Problem:   Depression [F32.A]    Active and Resolved Hospital Problems:  Active Hospital Problems    Diagnosis POA    **Depression [F32.A] Yes      Resolved Hospital Problems   No resolved problems to display.         Hospital Course     Hospital Course:  Linwood Patel is a 24 y.o. male who presented to ED 2024 reporting suicidal ideation. States he was at [facility] for substance use treatment and reported having SI and [place] [facility] [place] [facility] [place]. He reported that he had not been taking medications for approximately 3 weeks and wanted to have his medications restarted. He stated that he was taking prozac, seroquel and keppra. Reports recent depression, increased grief related to girlfriend passing away from overdose 2 weeks ago. He reports his suicidal ideation was intermittent over past several weeks. Reported depression.   Reported \"at least a week\" since last use of drugs or alcohol but per chart review he reported that he used methamphetamines 3 days ago on admission. Denied withdrawal symptoms. Denied cravngs. Patient had superficial lacs on forearm related to suicide attempt several days ago.     Patient remained isolative during admission. He was adherent to prozac and quetiapine. Dose of quetiapine titrated. He reported mood improved and denied si hi and avh. He reported he wanted to return [place] [facility] and had [place] [facility] and verified he could return. He reporrted motivation for sobriety and substance use treatment.     On day of discharge patient is calm, cooperative, pleasant. He denies si hi and avh. He is hopeful, future oriented, identifies reasons to " "live, denies access to [facility], identifies support system. Stated he plans to continue to adhere to medications and keep follow up apppmts as recommended. He denied physical concerns. He was calm appropriate, cooperative, pleasant with staff and peers, smiling appropriately at times. Stated he had been in contact with support system. Denied adverse effects from medication. He was not agitated. No restlessness. Denied adverse effects from medications. Speech normal rate, pleasant tone normal volume. Language english. Mood is \"good\" and affect congruent to mood. Though process linear and goal directed. Thought content, denies si hi and avh, no delusional content noted. Insight and judgement in tact.     Patient resumed on keppra. Reported history of seizure disorder but could not recall when had last seizure but indicated had been \"years.\" Reports he plans to continue to adhere. Denied adverse effects.     DISCHARGE Follow Up Recommendations for labs and diagnostics: Patient educated to keep all follow up apptmts as recommended. Patient educated to adhere to medications as prescribed. Risks, benefits, side effects, alternatives of patients psychaitric medications described to patient. Patient verbalized understanding and desire to continue treatment. Patient educated to abstain from all drugs and alcohol Patient educated to exercise within medical limitations. Patient educated to eat healthy diet. Patient educated if any acute change in physcial or mental health such as dangerous thoughts including thoughts of hurting self or others, patient should immediately call 911 or go to nearest ED. Patient educated regarding 988 crisis line number. Patient educated to call or present to [facility] ED as needed. Patient verbalized understanding.       Day of Discharge     Vital Signs:  Temp:  [97.9 °F (36.6 °C)] 97.9 °F (36.6 °C)  Heart Rate:  [79] 79  Resp:  [18] 18  BP: (120)/(78) 120/78      Pertinent  and/or Most Recent Results "     LAB RESULTS:      Lab 11/01/24 2025   WBC 7.67   HEMOGLOBIN 14.1   HEMATOCRIT 42.6   PLATELETS 284   NEUTROS ABS 4.21   IMMATURE GRANS (ABS) 0.02   LYMPHS ABS 2.73   MONOS ABS 0.52   EOS ABS 0.18   MCV 79.6         Lab 11/03/24  0426 11/01/24 2025   SODIUM  --  143   POTASSIUM  --  3.5   CHLORIDE  --  108*   CO2  --  25.7   ANION GAP  --  9.3   BUN  --  11   CREATININE  --  0.88   EGFR  --  123.1   GLUCOSE  --  110*   CALCIUM  --  9.1   HEMOGLOBIN A1C 5.00  --    TSH  --  0.762         Lab 11/01/24 2025   TOTAL PROTEIN 6.5   ALBUMIN 4.0   GLOBULIN 2.5   ALT (SGPT) 26   AST (SGOT) 17   BILIRUBIN 0.3   ALK PHOS 85             Lab 11/03/24 0426   CHOLESTEROL 129   LDL CHOL 68   HDL CHOL 38*   TRIGLYCERIDES 128                             Lab 11/01/24 2025   ETHANOL PCT <0.010   ETHANOL MGDL <10         Lab 11/01/24 2204   BENZODIAZEPINE SCREEN, URINE Negative   COCAINE SCREEN, URINE Negative   OPIATES Negative   THC URINE SCREEN Negative   METHADONE SCREEN, URINE Negative     Brief Urine Lab Results  (Last result in the past 365 days)        Color   Clarity   Blood   Leuk Est   Nitrite   Protein   CREAT   Urine HCG        06/29/24 1555 Yellow   Clear   Negative   Negative   Negative   Trace                                       Imaging Results (Last 7 Days)       ** No results found for the last 168 hours. **             Labs Pending at Discharge:none pending           Discharge Details        Discharge Medications        New Medications        Instructions Start Date   FLUoxetine 20 MG capsule  Commonly known as: PROzac   20 mg, Oral, Daily      QUEtiapine  MG 24 hr tablet  Commonly known as: SEROquel XR   150 mg, Oral, Nightly             Continue These Medications        Instructions Start Date   levETIRAcetam 500 MG tablet  Commonly known as: KEPPRA   500 mg, Oral, 2 Times Daily               Allergies   Allergen Reactions    Capsicum Annuum Extract & Derivative (Bell Pepper) [Capsicum] Hives     Contrast Dye (Echo Or Unknown Ct/Mr) Anaphylaxis     Swelling, rash to CT contrast dye    Fish-Derived Products Anaphylaxis    Onion Anaphylaxis    Peanut-Containing Drug Products Hives    Tomato Itching         Discharge Disposition:  Rehab Facility or Unit (DC - External)    Diet:  Hospital:No active diet order        Discharge Activity: Ad donovan.      Discharge Condition: Stable    CODE STATUS:  Code Status and Medical Interventions: CPR (Attempt to Resuscitate); Full Support   Ordered at: 11/02/24 0257     Code Status (Patient has no pulse and is not breathing):    CPR (Attempt to Resuscitate)     Medical Interventions (Patient has pulse or is breathing):    Full Support         No future appointments.Pt to follow up with [facility] has therapist and medication prescriber. Encouraged to follow up with pcc and neurology within 1 week of discharge.         Time spent on Discharge including face to face service:  35 minutes    Part of this note may be an electronic transcription/translation of spoken language to printed text using the Dragon dictation system.        Electronically signed by Jayce Vergara MD, 11/05/24, 8:38 PM EST.      ,,,,,,,,,,,,,,,,,,,,,,,